# Patient Record
Sex: MALE | Race: BLACK OR AFRICAN AMERICAN | Employment: OTHER | URBAN - METROPOLITAN AREA
[De-identification: names, ages, dates, MRNs, and addresses within clinical notes are randomized per-mention and may not be internally consistent; named-entity substitution may affect disease eponyms.]

---

## 2018-09-25 ENCOUNTER — APPOINTMENT (OUTPATIENT)
Dept: GENERAL RADIOLOGY | Age: 58
End: 2018-09-25
Payer: MEDICARE

## 2018-09-25 ENCOUNTER — HOSPITAL ENCOUNTER (EMERGENCY)
Age: 58
Discharge: ANOTHER HOSPITAL | End: 2018-09-25
Attending: EMERGENCY MEDICINE
Payer: MEDICARE

## 2018-09-25 VITALS
TEMPERATURE: 97.8 F | WEIGHT: 266 LBS | BODY MASS INDEX: 35.25 KG/M2 | DIASTOLIC BLOOD PRESSURE: 80 MMHG | OXYGEN SATURATION: 100 % | HEART RATE: 99 BPM | HEIGHT: 73 IN | SYSTOLIC BLOOD PRESSURE: 128 MMHG | RESPIRATION RATE: 16 BRPM

## 2018-09-25 DIAGNOSIS — M79.89 SWOLLEN ARM: ICD-10-CM

## 2018-09-25 DIAGNOSIS — T82.9XXA COMPLICATION OF ARTERIOVENOUS DIALYSIS FISTULA, INITIAL ENCOUNTER: Primary | ICD-10-CM

## 2018-09-25 LAB
% CKMB: 1.1 % (ref 0–3.5)
ABSOLUTE EOS #: 0.2 K/UL (ref 0–0.4)
ABSOLUTE IMMATURE GRANULOCYTE: ABNORMAL K/UL (ref 0–0.3)
ABSOLUTE LYMPH #: 1.7 K/UL (ref 1–4.8)
ABSOLUTE MONO #: 1.6 K/UL (ref 0.2–0.8)
ANION GAP SERPL CALCULATED.3IONS-SCNC: 18 MMOL/L (ref 9–17)
BASOPHILS # BLD: 0 % (ref 0–2)
BASOPHILS ABSOLUTE: 0 K/UL (ref 0–0.2)
BNP INTERPRETATION: ABNORMAL
BUN BLDV-MCNC: 45 MG/DL (ref 6–20)
BUN/CREAT BLD: 3 (ref 9–20)
CALCIUM SERPL-MCNC: 9.1 MG/DL (ref 8.6–10.4)
CHLORIDE BLD-SCNC: 90 MMOL/L (ref 98–107)
CK MB: 5.3 NG/ML
CKMB INTERPRETATION: ABNORMAL
CO2: 28 MMOL/L (ref 20–31)
CREAT SERPL-MCNC: 13.36 MG/DL (ref 0.7–1.2)
DIFFERENTIAL TYPE: ABNORMAL
EKG ATRIAL RATE: 82 BPM
EKG P AXIS: 71 DEGREES
EKG P-R INTERVAL: 176 MS
EKG Q-T INTERVAL: 418 MS
EKG QRS DURATION: 110 MS
EKG QTC CALCULATION (BAZETT): 488 MS
EKG R AXIS: 33 DEGREES
EKG T AXIS: 59 DEGREES
EKG VENTRICULAR RATE: 82 BPM
EOSINOPHILS RELATIVE PERCENT: 1 % (ref 1–4)
GFR AFRICAN AMERICAN: 5 ML/MIN
GFR NON-AFRICAN AMERICAN: 4 ML/MIN
GFR SERPL CREATININE-BSD FRML MDRD: ABNORMAL ML/MIN/{1.73_M2}
GFR SERPL CREATININE-BSD FRML MDRD: ABNORMAL ML/MIN/{1.73_M2}
GLUCOSE BLD-MCNC: 94 MG/DL (ref 70–99)
HCT VFR BLD CALC: 29.6 % (ref 41–53)
HEMOGLOBIN: 9.9 G/DL (ref 13.5–17.5)
IMMATURE GRANULOCYTES: ABNORMAL %
INR BLD: 1.1
LYMPHOCYTES # BLD: 12 % (ref 24–44)
MAGNESIUM: 2 MG/DL (ref 1.6–2.6)
MCH RBC QN AUTO: 30.7 PG (ref 26–34)
MCHC RBC AUTO-ENTMCNC: 33.5 G/DL (ref 31–37)
MCV RBC AUTO: 91.7 FL (ref 80–100)
MONOCYTES # BLD: 12 % (ref 1–7)
MYOGLOBIN: 1392 NG/ML (ref 28–72)
NRBC AUTOMATED: ABNORMAL PER 100 WBC
PARTIAL THROMBOPLASTIN TIME: 31.8 SEC (ref 23–31)
PDW BLD-RTO: 14.1 % (ref 11.5–14.5)
PLATELET # BLD: 161 K/UL (ref 130–400)
PLATELET ESTIMATE: ABNORMAL
PMV BLD AUTO: ABNORMAL FL (ref 6–12)
POTASSIUM SERPL-SCNC: 3.4 MMOL/L (ref 3.7–5.3)
PRO-BNP: 4129 PG/ML
PROTHROMBIN TIME: 11.1 SEC (ref 9.7–11.6)
RBC # BLD: 3.23 M/UL (ref 4.5–5.9)
RBC # BLD: ABNORMAL 10*6/UL
SEG NEUTROPHILS: 75 % (ref 36–66)
SEGMENTED NEUTROPHILS ABSOLUTE COUNT: 10.3 K/UL (ref 1.8–7.7)
SODIUM BLD-SCNC: 136 MMOL/L (ref 135–144)
TOTAL CK: 465 U/L (ref 39–308)
TROPONIN INTERP: ABNORMAL
TROPONIN T: 0.28 NG/ML
WBC # BLD: 13.8 K/UL (ref 3.5–11)
WBC # BLD: ABNORMAL 10*3/UL

## 2018-09-25 PROCEDURE — C9113 INJ PANTOPRAZOLE SODIUM, VIA: HCPCS | Performed by: EMERGENCY MEDICINE

## 2018-09-25 PROCEDURE — 96376 TX/PRO/DX INJ SAME DRUG ADON: CPT

## 2018-09-25 PROCEDURE — 93005 ELECTROCARDIOGRAM TRACING: CPT

## 2018-09-25 PROCEDURE — 2580000003 HC RX 258: Performed by: EMERGENCY MEDICINE

## 2018-09-25 PROCEDURE — 83880 ASSAY OF NATRIURETIC PEPTIDE: CPT

## 2018-09-25 PROCEDURE — 85025 COMPLETE CBC W/AUTO DIFF WBC: CPT

## 2018-09-25 PROCEDURE — 96375 TX/PRO/DX INJ NEW DRUG ADDON: CPT

## 2018-09-25 PROCEDURE — 99283 EMERGENCY DEPT VISIT LOW MDM: CPT

## 2018-09-25 PROCEDURE — 82553 CREATINE MB FRACTION: CPT

## 2018-09-25 PROCEDURE — 6370000000 HC RX 637 (ALT 250 FOR IP): Performed by: EMERGENCY MEDICINE

## 2018-09-25 PROCEDURE — 83735 ASSAY OF MAGNESIUM: CPT

## 2018-09-25 PROCEDURE — 82550 ASSAY OF CK (CPK): CPT

## 2018-09-25 PROCEDURE — 85730 THROMBOPLASTIN TIME PARTIAL: CPT

## 2018-09-25 PROCEDURE — 84484 ASSAY OF TROPONIN QUANT: CPT

## 2018-09-25 PROCEDURE — 85610 PROTHROMBIN TIME: CPT

## 2018-09-25 PROCEDURE — 71045 X-RAY EXAM CHEST 1 VIEW: CPT

## 2018-09-25 PROCEDURE — 96374 THER/PROPH/DIAG INJ IV PUSH: CPT

## 2018-09-25 PROCEDURE — 6360000002 HC RX W HCPCS: Performed by: EMERGENCY MEDICINE

## 2018-09-25 PROCEDURE — 83874 ASSAY OF MYOGLOBIN: CPT

## 2018-09-25 PROCEDURE — 80048 BASIC METABOLIC PNL TOTAL CA: CPT

## 2018-09-25 RX ORDER — 0.9 % SODIUM CHLORIDE 0.9 %
10 VIAL (ML) INJECTION ONCE
Status: COMPLETED | OUTPATIENT
Start: 2018-09-25 | End: 2018-09-25

## 2018-09-25 RX ORDER — PANTOPRAZOLE SODIUM 40 MG/10ML
40 INJECTION, POWDER, LYOPHILIZED, FOR SOLUTION INTRAVENOUS ONCE
Status: COMPLETED | OUTPATIENT
Start: 2018-09-25 | End: 2018-09-25

## 2018-09-25 RX ORDER — ONDANSETRON 2 MG/ML
4 INJECTION INTRAMUSCULAR; INTRAVENOUS ONCE
Status: COMPLETED | OUTPATIENT
Start: 2018-09-25 | End: 2018-09-25

## 2018-09-25 RX ORDER — ASPIRIN 81 MG/1
324 TABLET, CHEWABLE ORAL ONCE
Status: COMPLETED | OUTPATIENT
Start: 2018-09-25 | End: 2018-09-25

## 2018-09-25 RX ORDER — MORPHINE SULFATE 4 MG/ML
4 INJECTION, SOLUTION INTRAMUSCULAR; INTRAVENOUS ONCE
Status: COMPLETED | OUTPATIENT
Start: 2018-09-25 | End: 2018-09-25

## 2018-09-25 RX ADMIN — MORPHINE SULFATE 4 MG: 4 INJECTION INTRAVENOUS at 05:48

## 2018-09-25 RX ADMIN — LIDOCAINE HYDROCHLORIDE: 20 SOLUTION ORAL; TOPICAL at 06:31

## 2018-09-25 RX ADMIN — Medication 10 ML: at 06:32

## 2018-09-25 RX ADMIN — ONDANSETRON 4 MG: 2 INJECTION INTRAMUSCULAR; INTRAVENOUS at 05:48

## 2018-09-25 RX ADMIN — MORPHINE SULFATE 4 MG: 4 INJECTION INTRAVENOUS at 06:32

## 2018-09-25 RX ADMIN — PANTOPRAZOLE SODIUM 40 MG: 40 INJECTION, POWDER, FOR SOLUTION INTRAVENOUS at 06:31

## 2018-09-25 RX ADMIN — ASPIRIN 81 MG 324 MG: 81 TABLET ORAL at 06:36

## 2018-09-25 ASSESSMENT — PAIN SCALES - GENERAL
PAINLEVEL_OUTOF10: 10
PAINLEVEL_OUTOF10: 4

## 2018-09-25 ASSESSMENT — PAIN DESCRIPTION - ORIENTATION: ORIENTATION: LEFT

## 2018-09-25 ASSESSMENT — PAIN DESCRIPTION - LOCATION: LOCATION: ARM

## 2018-09-25 NOTE — ED NOTES
Ok per Dr Velvet Campbell for patient to go to bed 8802-0 with sudden onset of chest pain, advised Livier PETTIT at [facility]  09/25/18 6869

## 2018-09-25 NOTE — ED NOTES
Fistula was put in at [facility] waiting on confirmation of transfer     Leonel Mendez RN  09/25/18 1360

## 2018-09-26 LAB
EKG ATRIAL RATE: 86 BPM
EKG P AXIS: 70 DEGREES
EKG P-R INTERVAL: 172 MS
EKG Q-T INTERVAL: 418 MS
EKG QRS DURATION: 104 MS
EKG QTC CALCULATION (BAZETT): 500 MS
EKG R AXIS: 27 DEGREES
EKG T AXIS: 57 DEGREES
EKG VENTRICULAR RATE: 86 BPM

## 2019-08-14 ENCOUNTER — HOSPITAL ENCOUNTER (EMERGENCY)
Age: 59
Discharge: HOME | End: 2019-08-14
Attending: EMERGENCY MEDICINE
Payer: COMMERCIAL

## 2019-08-14 VITALS
OXYGEN SATURATION: 99 % | BODY MASS INDEX: 35.25 KG/M2 | TEMPERATURE: 98.1 F | WEIGHT: 266 LBS | HEART RATE: 102 BPM | DIASTOLIC BLOOD PRESSURE: 68 MMHG | SYSTOLIC BLOOD PRESSURE: 105 MMHG | RESPIRATION RATE: 15 BRPM | HEIGHT: 73 IN

## 2019-08-14 DIAGNOSIS — Z95.828 S/P ARTERIOVENOUS (AV) GRAFT PLACEMENT: Primary | ICD-10-CM

## 2019-08-14 DIAGNOSIS — R58 BLEEDING: ICD-10-CM

## 2019-08-14 LAB
HCT VFR BLD CALC: 27.8 % (ref 40.7–50.3)
HEMOGLOBIN: 8.9 G/DL (ref 13–17)
MCH RBC QN AUTO: 31 PG (ref 25.2–33.5)
MCHC RBC AUTO-ENTMCNC: 32 G/DL (ref 28.4–34.8)
MCV RBC AUTO: 96.9 FL (ref 82.6–102.9)
NRBC AUTOMATED: 0 PER 100 WBC
PDW BLD-RTO: 15.6 % (ref 11.8–14.4)
PLATELET # BLD: 177 K/UL (ref 138–453)
PMV BLD AUTO: 12.2 FL (ref 8.1–13.5)
RBC # BLD: 2.87 M/UL (ref 4.21–5.77)
WBC # BLD: 12.1 K/UL (ref 3.5–11.3)

## 2019-08-14 PROCEDURE — 85027 COMPLETE CBC AUTOMATED: CPT

## 2019-08-14 PROCEDURE — 99283 EMERGENCY DEPT VISIT LOW MDM: CPT

## 2019-08-14 ASSESSMENT — ENCOUNTER SYMPTOMS
CONSTIPATION: 0
DIARRHEA: 0
ABDOMINAL DISTENTION: 0
SORE THROAT: 0
NAUSEA: 0
WHEEZING: 0
VOMITING: 0
SHORTNESS OF BREATH: 0
COUGH: 0
RHINORRHEA: 0

## 2019-08-14 ASSESSMENT — PAIN DESCRIPTION - PAIN TYPE: TYPE: ACUTE PAIN

## 2019-08-14 ASSESSMENT — PAIN SCALES - GENERAL: PAINLEVEL_OUTOF10: 8

## 2019-08-14 ASSESSMENT — PAIN DESCRIPTION - LOCATION: LOCATION: ARM

## 2019-08-14 ASSESSMENT — PAIN DESCRIPTION - ORIENTATION: ORIENTATION: RIGHT;UPPER

## 2019-08-15 ENCOUNTER — HOSPITAL ENCOUNTER (EMERGENCY)
Age: 59
Discharge: ANOTHER HOSPITAL | End: 2019-08-15
Attending: EMERGENCY MEDICINE
Payer: COMMERCIAL

## 2019-08-15 VITALS
SYSTOLIC BLOOD PRESSURE: 114 MMHG | DIASTOLIC BLOOD PRESSURE: 26 MMHG | WEIGHT: 274 LBS | BODY MASS INDEX: 36.31 KG/M2 | TEMPERATURE: 97.9 F | RESPIRATION RATE: 18 BRPM | HEIGHT: 73 IN | OXYGEN SATURATION: 100 % | HEART RATE: 86 BPM

## 2019-08-15 DIAGNOSIS — T82.838A: Primary | ICD-10-CM

## 2019-08-15 PROCEDURE — 99284 EMERGENCY DEPT VISIT MOD MDM: CPT

## 2019-08-15 RX ORDER — LANTHANUM CARBONATE 1000 MG/1
1000 TABLET, CHEWABLE ORAL
Refills: 11 | COMMUNITY
Start: 2019-07-15

## 2019-08-15 RX ORDER — FOLIC ACID/VIT B COMPLEX AND C 0.8 MG
TABLET ORAL
Refills: 4 | Status: ON HOLD | COMMUNITY
Start: 2019-06-05 | End: 2022-02-28 | Stop reason: SDDI

## 2019-08-15 RX ORDER — OXYCODONE HYDROCHLORIDE AND ACETAMINOPHEN 5; 325 MG/1; MG/1
TABLET ORAL
Refills: 0 | Status: ON HOLD | COMMUNITY
Start: 2019-08-12 | End: 2020-10-21 | Stop reason: HOSPADM

## 2019-08-15 RX ORDER — APIXABAN 5 MG/1
5 TABLET, FILM COATED ORAL 2 TIMES DAILY
Refills: 3 | Status: ON HOLD | COMMUNITY
Start: 2019-07-29 | End: 2022-02-28 | Stop reason: SINTOL

## 2019-08-15 RX ORDER — METOPROLOL SUCCINATE 50 MG/1
50 TABLET, EXTENDED RELEASE ORAL DAILY
Refills: 2 | COMMUNITY
Start: 2019-07-03

## 2019-08-15 RX ORDER — LOSARTAN POTASSIUM 25 MG/1
TABLET ORAL
Refills: 2 | Status: ON HOLD | COMMUNITY
Start: 2019-07-03 | End: 2020-10-21 | Stop reason: HOSPADM

## 2019-08-15 ASSESSMENT — ENCOUNTER SYMPTOMS
SHORTNESS OF BREATH: 0
COLOR CHANGE: 0
COUGH: 0

## 2019-08-15 ASSESSMENT — PAIN DESCRIPTION - PAIN TYPE: TYPE: ACUTE PAIN

## 2019-08-15 ASSESSMENT — PAIN DESCRIPTION - LOCATION: LOCATION: ARM

## 2019-08-15 ASSESSMENT — PAIN DESCRIPTION - ORIENTATION: ORIENTATION: RIGHT

## 2019-08-15 NOTE — ED NOTES
Pt presents to ED via EMS from dialysis ambulatory to room 27 c/o of bleeding graft to right upper arm. Pt states graft was placed on Monday at [facility]. Pt states going to [facility] yesterday for same complaint. Pt received full dialysis treatment today. Pt right arm pressure wrapped. Pt rates pain 8/10. Palp radial pulse with cap refill less than 3 seconds.       Richie Runner, RN  08/15/19 6224

## 2019-08-15 NOTE — ED PROVIDER NOTES
respiratory distress. Neurological: He is alert and oriented to person, place, and time. Skin: Skin is warm and dry. No rash noted. He is not diaphoretic.   4-5 cm incision site to right upper arm. Minimal bleeding from area. Clots noted on dressing. Psychiatric: He has a normal mood and affect. His behavior is normal.   Vitals reviewed. EMERGENCY DEPARTMENT COURSE and DIFFERENTIAL DIAGNOSIS/MDM:   Vitals:    Vitals:    08/15/19 1135 08/15/19 1136   BP:  (!) 115/50   Pulse: 90    Resp: 18    Temp: 97.9 °F (36.6 °C)    SpO2: 100%    Weight: 274 lb (124.3 kg)    Height: 6' 1\" (1.854 m)          CLINICAL DECISION MAKING:  The patient presented alert with a nontoxic appearance and was seen in conjunction with Dr. James Garcia. The patient will be transferred to [facility] to be evaluated by his surgeon. FINAL IMPRESSION      1. Hemorrhage of arteriovenous graft, initial encounter [facility])            Problem List  Patient Active Problem List   Diagnosis Code    Biventricular CHF (congestive heart failure) (HCC) with icd chronic  I50.82    A-fib (HCC) I48.91    DM (diabetes mellitus) type II controlled with renal manifestation ([facility]) E11.29    Pulmonary emboli ([facility] rt lower lobe in 8/15 with negative venous doppler and tt since august 14 - with therapeutic inr  I26.99    ESRD (end stage renal disease) on dialysis ([address] N18.6, Z99.2    Leukocytosis D72.829    Tachycardia R00.0    S/P cholecystectomy Z90.49    Cardiomyopathy ([facility] I42.9    Anemia of chronic disease D63.8         DISPOSITION/PLAN   DISPOSITION Decision To Transfer 08/15/2019 02:53:28 PM      PATIENT REFERRED TO:   No follow-up provider specified.     DISCHARGE MEDICATIONS:     New Prescriptions    No medications on file           (Please note that portions of this note were completed with a voice recognition program.  Efforts were made to edit the dictations but occasionally words are mis-transcribed.)    GYPSY Barker - DIMITRIS Baker

## 2019-09-21 ENCOUNTER — HOSPITAL ENCOUNTER (EMERGENCY)
Age: 59
Discharge: ANOTHER HOSPITAL | End: 2019-09-21
Attending: EMERGENCY MEDICINE
Payer: COMMERCIAL

## 2019-09-21 ENCOUNTER — APPOINTMENT (OUTPATIENT)
Dept: GENERAL RADIOLOGY | Age: 59
End: 2019-09-21
Payer: COMMERCIAL

## 2019-09-21 VITALS
OXYGEN SATURATION: 97 % | DIASTOLIC BLOOD PRESSURE: 67 MMHG | HEART RATE: 94 BPM | WEIGHT: 276 LBS | HEIGHT: 73 IN | TEMPERATURE: 100.3 F | SYSTOLIC BLOOD PRESSURE: 146 MMHG | BODY MASS INDEX: 36.58 KG/M2 | RESPIRATION RATE: 15 BRPM

## 2019-09-21 DIAGNOSIS — L08.9 WOUND INFECTION: Primary | ICD-10-CM

## 2019-09-21 DIAGNOSIS — T14.8XXA WOUND INFECTION: Primary | ICD-10-CM

## 2019-09-21 LAB
ABSOLUTE EOS #: 0 K/UL (ref 0–0.4)
ABSOLUTE IMMATURE GRANULOCYTE: 0 K/UL (ref 0–0.3)
ABSOLUTE LYMPH #: 1.34 K/UL (ref 1–4.8)
ABSOLUTE MONO #: 1.01 K/UL (ref 0.1–0.8)
ALBUMIN SERPL-MCNC: 3.9 G/DL (ref 3.5–5.2)
ALBUMIN/GLOBULIN RATIO: 1 (ref 1–2.5)
ALP BLD-CCNC: 86 U/L (ref 40–129)
ALT SERPL-CCNC: 15 U/L (ref 5–41)
ANION GAP SERPL CALCULATED.3IONS-SCNC: 11 MMOL/L (ref 9–17)
AST SERPL-CCNC: 29 U/L
BASOPHILS # BLD: 0 % (ref 0–2)
BASOPHILS ABSOLUTE: 0 K/UL (ref 0–0.2)
BILIRUB SERPL-MCNC: 0.55 MG/DL (ref 0.3–1.2)
BUN BLDV-MCNC: 13 MG/DL (ref 6–20)
BUN/CREAT BLD: ABNORMAL (ref 9–20)
C-REACTIVE PROTEIN: 82.5 MG/L (ref 0–5)
CALCIUM SERPL-MCNC: 9.1 MG/DL (ref 8.6–10.4)
CHLORIDE BLD-SCNC: 90 MMOL/L (ref 98–107)
CO2: 34 MMOL/L (ref 20–31)
CREAT SERPL-MCNC: 5.91 MG/DL (ref 0.7–1.2)
DIFFERENTIAL TYPE: ABNORMAL
EOSINOPHILS RELATIVE PERCENT: 0 % (ref 1–4)
GFR AFRICAN AMERICAN: 12 ML/MIN
GFR NON-AFRICAN AMERICAN: 10 ML/MIN
GFR SERPL CREATININE-BSD FRML MDRD: ABNORMAL ML/MIN/{1.73_M2}
GFR SERPL CREATININE-BSD FRML MDRD: ABNORMAL ML/MIN/{1.73_M2}
GLUCOSE BLD-MCNC: 99 MG/DL (ref 70–99)
HCT VFR BLD CALC: 32.8 % (ref 40.7–50.3)
HEMOGLOBIN: 10.1 G/DL (ref 13–17)
IMMATURE GRANULOCYTES: 0 %
INR BLD: 1.2
LACTIC ACID, SEPSIS WHOLE BLOOD: 1.2 MMOL/L (ref 0.5–1.9)
LACTIC ACID, SEPSIS: NORMAL MMOL/L (ref 0.5–1.9)
LYMPHOCYTES # BLD: 20 % (ref 24–44)
MCH RBC QN AUTO: 30.8 PG (ref 25.2–33.5)
MCHC RBC AUTO-ENTMCNC: 30.8 G/DL (ref 28.4–34.8)
MCV RBC AUTO: 100 FL (ref 82.6–102.9)
MONOCYTES # BLD: 15 % (ref 1–7)
MORPHOLOGY: ABNORMAL
NRBC AUTOMATED: 0 PER 100 WBC
PARTIAL THROMBOPLASTIN TIME: 26.4 SEC (ref 20.5–30.5)
PDW BLD-RTO: 15.9 % (ref 11.8–14.4)
PLATELET # BLD: 140 K/UL (ref 138–453)
PLATELET ESTIMATE: ABNORMAL
PMV BLD AUTO: 12 FL (ref 8.1–13.5)
POTASSIUM SERPL-SCNC: 4 MMOL/L (ref 3.7–5.3)
PROTHROMBIN TIME: 12.3 SEC (ref 9–12)
RBC # BLD: 3.28 M/UL (ref 4.21–5.77)
RBC # BLD: ABNORMAL 10*6/UL
SEG NEUTROPHILS: 65 % (ref 36–66)
SEGMENTED NEUTROPHILS ABSOLUTE COUNT: 4.35 K/UL (ref 1.8–7.7)
SODIUM BLD-SCNC: 135 MMOL/L (ref 135–144)
TOTAL PROTEIN: 8 G/DL (ref 6.4–8.3)
VANCOMYCIN RANDOM DATE LAST DOSE: NORMAL
VANCOMYCIN RANDOM DOSE AMOUNT: NORMAL
VANCOMYCIN RANDOM TIME LAST DOSE: NORMAL
VANCOMYCIN RANDOM: <4 UG/ML
WBC # BLD: 6.7 K/UL (ref 3.5–11.3)
WBC # BLD: ABNORMAL 10*3/UL

## 2019-09-21 PROCEDURE — 86140 C-REACTIVE PROTEIN: CPT

## 2019-09-21 PROCEDURE — 80202 ASSAY OF VANCOMYCIN: CPT

## 2019-09-21 PROCEDURE — 80053 COMPREHEN METABOLIC PANEL: CPT

## 2019-09-21 PROCEDURE — 83605 ASSAY OF LACTIC ACID: CPT

## 2019-09-21 PROCEDURE — 87186 SC STD MICRODIL/AGAR DIL: CPT

## 2019-09-21 PROCEDURE — 87205 SMEAR GRAM STAIN: CPT

## 2019-09-21 PROCEDURE — 99285 EMERGENCY DEPT VISIT HI MDM: CPT

## 2019-09-21 PROCEDURE — 87040 BLOOD CULTURE FOR BACTERIA: CPT

## 2019-09-21 PROCEDURE — 6370000000 HC RX 637 (ALT 250 FOR IP): Performed by: STUDENT IN AN ORGANIZED HEALTH CARE EDUCATION/TRAINING PROGRAM

## 2019-09-21 PROCEDURE — 85025 COMPLETE CBC W/AUTO DIFF WBC: CPT

## 2019-09-21 PROCEDURE — 85730 THROMBOPLASTIN TIME PARTIAL: CPT

## 2019-09-21 PROCEDURE — 71046 X-RAY EXAM CHEST 2 VIEWS: CPT

## 2019-09-21 PROCEDURE — 85610 PROTHROMBIN TIME: CPT

## 2019-09-21 PROCEDURE — 6360000002 HC RX W HCPCS: Performed by: STUDENT IN AN ORGANIZED HEALTH CARE EDUCATION/TRAINING PROGRAM

## 2019-09-21 PROCEDURE — 87077 CULTURE AEROBIC IDENTIFY: CPT

## 2019-09-21 PROCEDURE — 2580000003 HC RX 258: Performed by: STUDENT IN AN ORGANIZED HEALTH CARE EDUCATION/TRAINING PROGRAM

## 2019-09-21 RX ORDER — ACETAMINOPHEN 325 MG/1
650 TABLET ORAL ONCE
Status: COMPLETED | OUTPATIENT
Start: 2019-09-21 | End: 2019-09-21

## 2019-09-21 RX ADMIN — PIPERACILLIN AND TAZOBACTAM 2.25 G: 2; .25 INJECTION, POWDER, LYOPHILIZED, FOR SOLUTION INTRAVENOUS; PARENTERAL at 20:15

## 2019-09-21 RX ADMIN — ACETAMINOPHEN 650 MG: 325 TABLET ORAL at 20:17

## 2019-09-21 RX ADMIN — VANCOMYCIN HYDROCHLORIDE 2000 MG: 1 INJECTION, POWDER, LYOPHILIZED, FOR SOLUTION INTRAVENOUS at 21:11

## 2019-09-21 ASSESSMENT — ENCOUNTER SYMPTOMS
EYE PAIN: 0
ABDOMINAL DISTENTION: 0
RHINORRHEA: 0
BACK PAIN: 0
SHORTNESS OF BREATH: 0
COLOR CHANGE: 1
VOMITING: 0
TROUBLE SWALLOWING: 0
CONSTIPATION: 0
ABDOMINAL PAIN: 0
SORE THROAT: 0
COUGH: 0
NAUSEA: 0
DIARRHEA: 1
CHOKING: 0
WHEEZING: 0

## 2019-09-21 ASSESSMENT — PAIN DESCRIPTION - PAIN TYPE: TYPE: ACUTE PAIN

## 2019-09-21 ASSESSMENT — PAIN DESCRIPTION - DESCRIPTORS: DESCRIPTORS: DISCOMFORT

## 2019-09-21 ASSESSMENT — PAIN DESCRIPTION - LOCATION: LOCATION: GENERALIZED;HEAD

## 2019-09-21 ASSESSMENT — PAIN SCALES - GENERAL: PAINLEVEL_OUTOF10: 5

## 2019-09-21 NOTE — ED PROVIDER NOTES
DIFFERENTIAL   Result Value Ref Range    WBC 6.7 3.5 - 11.3 k/uL    RBC 3.28 (L) 4.21 - 5.77 m/uL    Hemoglobin 10.1 (L) 13.0 - 17.0 g/dL    Hematocrit 32.8 (L) 40.7 - 50.3 %    .0 82.6 - 102.9 fL    MCH 30.8 25.2 - 33.5 pg    MCHC 30.8 28.4 - 34.8 g/dL    RDW 15.9 (H) 11.8 - 14.4 %    Platelets 454 356 - 737 k/uL    MPV 12.0 8.1 - 13.5 fL    NRBC Automated 0.0 0.0 per 100 WBC    Differential Type NOT REPORTED     WBC Morphology NOT REPORTED     RBC Morphology NOT REPORTED     Platelet Estimate NOT REPORTED     Immature Granulocytes 0 0 %    Seg Neutrophils 65 36 - 66 %    Lymphocytes 20 (L) 24 - 44 %    Monocytes 15 (H) 1 - 7 %    Eosinophils % 0 (L) 1 - 4 %    Basophils 0 0 - 2 %    Absolute Immature Granulocyte 0.00 0.00 - 0.30 k/uL    Segs Absolute 4.35 1.8 - 7.7 k/uL    Absolute Lymph # 1.34 1.0 - 4.8 k/uL    Absolute Mono # 1.01 (H) 0.1 - 0.8 k/uL    Absolute Eos # 0.00 0.0 - 0.4 k/uL    Basophils Absolute 0.00 0.0 - 0.2 k/uL    Morphology ANISOCYTOSIS PRESENT    Comprehensive Metabolic Panel   Result Value Ref Range    Glucose 99 70 - 99 mg/dL    BUN 13 6 - 20 mg/dL    CREATININE 5.91 (HH) 0.70 - 1.20 mg/dL    Bun/Cre Ratio NOT REPORTED 9 - 20    Calcium 9.1 8.6 - 10.4 mg/dL    Sodium 135 135 - 144 mmol/L    Potassium 4.0 3.7 - 5.3 mmol/L    Chloride 90 (L) 98 - 107 mmol/L    CO2 34 (H) 20 - 31 mmol/L    Anion Gap 11 9 - 17 mmol/L    Alkaline Phosphatase 86 40 - 129 U/L    ALT 15 5 - 41 U/L    AST 29 <40 U/L    Total Bilirubin 0.55 0.3 - 1.2 mg/dL    Total Protein 8.0 6.4 - 8.3 g/dL    Alb 3.9 3.5 - 5.2 g/dL    Albumin/Globulin Ratio 1.0 1.0 - 2.5    GFR Non-African American 10 (L) >60 mL/min    GFR  12 (L) >60 mL/min    GFR Comment          GFR Staging NOT REPORTED    Protime-INR   Result Value Ref Range    Protime 12.3 (H) 9.0 - 12.0 sec    INR 1.2    APTT   Result Value Ref Range    PTT 26.4 20.5 - 30.5 sec   C-REACTIVE PROTEIN   Result Value Ref Range    CRP 82.5 (H) 0.0 - 5.0 mg/L Vancomycin, Random   Result Value Ref Range    Vancomycin Rm <4.0 ug/mL    Vancomycin Random Dose amount NOT REPORTED     Vancomycin Random Date last dose NOT REPORTED     Vancomycin Random Time last dose NOT REPORTED        RADIOLOGY:  Xr Chest Standard (2 Vw)    Result Date: 9/21/2019  EXAMINATION: TWO XRAY VIEWS OF THE CHEST 9/21/2019 7:13 pm COMPARISON: Chest radiograph 09/25/2019. HISTORY: ORDERING SYSTEM PROVIDED HISTORY: fever TECHNOLOGIST PROVIDED HISTORY: fever Reason for Exam: c/o headache, chills, not feeling well, fever Acuity: Acute Type of Exam: Initial FINDINGS: Two views provided. Mild rotation to the left. Stable mediastinal and cardiac silhouettes. Left-sided AICD appears stable. There is a right dual-lumen CVC with the distal tip in the proximal to mid superior vena cava. Stable right hemidiaphragm elevation. No acute consolidation or interstitial edema. No pneumothorax or free subdiaphragmatic air. No acute pneumonia. EKG  None    All EKG's are interpreted by the Emergency Department Physician who either signs or Co-signs this chart in the absence of a cardiologist.    EMERGENCY DEPARTMENT COURSE:  Patient with fever, tachycardia history of end-stage renal disease with recent surgical site infection from arteriovenous graft on right forearm. Patient is not taking any antipyretics. Was possibly on doxycycline. Patient was supposed to be taken to the OR for washout but left the hospital [address] Patient's vascular surgeon was called at [address] [place] [address] who agreed to take the patient is a transfer. Internal medicine at the [address] [place] [address] was also contacted. Patient will be started on vancomycin and Zosyn. Vancomycin was possibly given 1 to 2 days ago [address] so a vancomycin level was drawn prior to dosing by pharmacy.   CRP elevated patient creatinine elevated but no gross electrolyte dyscrasias with the

## 2019-09-22 NOTE — ED NOTES
Transferred to [facility] via [facility] at this time.  Charting sent with pt      Jhonatan Dave RN  09/21/19 4594

## 2019-09-23 LAB
CULTURE: ABNORMAL
Lab: ABNORMAL
Lab: ABNORMAL
SPECIMEN DESCRIPTION: ABNORMAL
SPECIMEN DESCRIPTION: ABNORMAL

## 2020-10-17 ENCOUNTER — APPOINTMENT (OUTPATIENT)
Dept: GENERAL RADIOLOGY | Age: 60
End: 2020-10-17
Payer: COMMERCIAL

## 2020-10-17 ENCOUNTER — HOSPITAL ENCOUNTER (EMERGENCY)
Age: 60
Discharge: OTHER INSTITUTION | End: 2020-10-17
Attending: EMERGENCY MEDICINE
Payer: COMMERCIAL

## 2020-10-17 ENCOUNTER — HOSPITAL ENCOUNTER (INPATIENT)
Age: 60
LOS: 4 days | Discharge: HOME | DRG: 871 | End: 2020-10-21
Attending: FAMILY MEDICINE | Admitting: FAMILY MEDICINE
Payer: COMMERCIAL

## 2020-10-17 VITALS
HEART RATE: 89 BPM | WEIGHT: 270.73 LBS | OXYGEN SATURATION: 95 % | RESPIRATION RATE: 18 BRPM | DIASTOLIC BLOOD PRESSURE: 85 MMHG | SYSTOLIC BLOOD PRESSURE: 128 MMHG | BODY MASS INDEX: 35.88 KG/M2 | TEMPERATURE: 99.1 F | HEIGHT: 73 IN

## 2020-10-17 PROBLEM — R50.9 FEVER OF UNKNOWN ORIGIN: Status: ACTIVE | Noted: 2020-10-17

## 2020-10-17 PROBLEM — R50.9 FEVER AND CHILLS: Status: ACTIVE | Noted: 2020-10-17

## 2020-10-17 LAB
ABSOLUTE EOS #: 0 K/UL (ref 0–0.4)
ABSOLUTE IMMATURE GRANULOCYTE: 0.09 K/UL (ref 0–0.3)
ABSOLUTE LYMPH #: 0.81 K/UL (ref 1–4.8)
ABSOLUTE MONO #: 1.62 K/UL (ref 0.2–0.8)
ALBUMIN SERPL-MCNC: 3.9 G/DL (ref 3.5–5.2)
ALBUMIN/GLOBULIN RATIO: ABNORMAL (ref 1–2.5)
ALP BLD-CCNC: 76 U/L (ref 40–129)
ALT SERPL-CCNC: 21 U/L (ref 5–41)
ANION GAP SERPL CALCULATED.3IONS-SCNC: 15 MMOL/L (ref 9–17)
AST SERPL-CCNC: 24 U/L
BASOPHILS # BLD: 0 %
BASOPHILS ABSOLUTE: 0 K/UL (ref 0–0.2)
BILIRUB SERPL-MCNC: 0.51 MG/DL (ref 0.3–1.2)
BILIRUBIN DIRECT: 0.14 MG/DL
BILIRUBIN, INDIRECT: 0.37 MG/DL (ref 0–1)
BUN BLDV-MCNC: 38 MG/DL (ref 6–20)
BUN/CREAT BLD: 3 (ref 9–20)
CALCIUM SERPL-MCNC: 9.4 MG/DL (ref 8.6–10.4)
CHLORIDE BLD-SCNC: 92 MMOL/L (ref 98–107)
CO2: 27 MMOL/L (ref 20–31)
CREAT SERPL-MCNC: 11.64 MG/DL (ref 0.7–1.2)
DIFFERENTIAL TYPE: ABNORMAL
EOSINOPHILS RELATIVE PERCENT: 0 % (ref 1–4)
GFR AFRICAN AMERICAN: 5 ML/MIN
GFR NON-AFRICAN AMERICAN: 5 ML/MIN
GFR SERPL CREATININE-BSD FRML MDRD: ABNORMAL ML/MIN/{1.73_M2}
GFR SERPL CREATININE-BSD FRML MDRD: ABNORMAL ML/MIN/{1.73_M2}
GLOBULIN: ABNORMAL G/DL (ref 1.5–3.8)
GLUCOSE BLD-MCNC: 93 MG/DL (ref 70–99)
HCT VFR BLD CALC: 30.2 % (ref 40.7–50.3)
HEMOGLOBIN: 9.7 G/DL (ref 13–17)
IMMATURE GRANULOCYTES: 1 %
LACTIC ACID, SEPSIS WHOLE BLOOD: NORMAL MMOL/L (ref 0.5–1.9)
LACTIC ACID, SEPSIS: 1 MMOL/L (ref 0.5–1.9)
LIPASE: 23 U/L (ref 13–60)
LYMPHOCYTES # BLD: 9 % (ref 24–44)
MCH RBC QN AUTO: 30 PG (ref 25.2–33.5)
MCHC RBC AUTO-ENTMCNC: 32.1 G/DL (ref 28.4–34.8)
MCV RBC AUTO: 93.5 FL (ref 82.6–102.9)
MONOCYTES # BLD: 18 % (ref 1–7)
NRBC AUTOMATED: 0 PER 100 WBC
PDW BLD-RTO: 14.1 % (ref 11.8–14.4)
PLATELET # BLD: 213 K/UL (ref 138–453)
PLATELET ESTIMATE: ABNORMAL
PMV BLD AUTO: 11.4 FL (ref 8.1–13.5)
POTASSIUM SERPL-SCNC: 4.8 MMOL/L (ref 3.7–5.3)
RBC # BLD: 3.23 M/UL (ref 4.21–5.77)
RBC # BLD: ABNORMAL 10*6/UL
SARS-COV-2, RAPID: NOT DETECTED
SARS-COV-2: NORMAL
SARS-COV-2: NORMAL
SEG NEUTROPHILS: 72 % (ref 36–66)
SEGMENTED NEUTROPHILS ABSOLUTE COUNT: 6.48 K/UL (ref 1.8–7.7)
SODIUM BLD-SCNC: 134 MMOL/L (ref 135–144)
SOURCE: NORMAL
TOTAL PROTEIN: 8.5 G/DL (ref 6.4–8.3)
TROPONIN INTERP: ABNORMAL
TROPONIN T: ABNORMAL NG/ML
TROPONIN, HIGH SENSITIVITY: 140 NG/L (ref 0–22)
TROPONIN, HIGH SENSITIVITY: 154 NG/L (ref 0–22)
TROPONIN, HIGH SENSITIVITY: 186 NG/L (ref 0–22)
WBC # BLD: 9 K/UL (ref 3.5–11.3)
WBC # BLD: ABNORMAL 10*3/UL

## 2020-10-17 PROCEDURE — 83605 ASSAY OF LACTIC ACID: CPT

## 2020-10-17 PROCEDURE — 99223 1ST HOSP IP/OBS HIGH 75: CPT | Performed by: INTERNAL MEDICINE

## 2020-10-17 PROCEDURE — 86403 PARTICLE AGGLUT ANTBDY SCRN: CPT

## 2020-10-17 PROCEDURE — 2580000003 HC RX 258: Performed by: FAMILY MEDICINE

## 2020-10-17 PROCEDURE — 6370000000 HC RX 637 (ALT 250 FOR IP): Performed by: INTERNAL MEDICINE

## 2020-10-17 PROCEDURE — 87150 DNA/RNA AMPLIFIED PROBE: CPT

## 2020-10-17 PROCEDURE — 6360000002 HC RX W HCPCS: Performed by: EMERGENCY MEDICINE

## 2020-10-17 PROCEDURE — 84484 ASSAY OF TROPONIN QUANT: CPT

## 2020-10-17 PROCEDURE — 93005 ELECTROCARDIOGRAM TRACING: CPT | Performed by: EMERGENCY MEDICINE

## 2020-10-17 PROCEDURE — 36415 COLL VENOUS BLD VENIPUNCTURE: CPT

## 2020-10-17 PROCEDURE — 2580000003 HC RX 258: Performed by: EMERGENCY MEDICINE

## 2020-10-17 PROCEDURE — 90935 HEMODIALYSIS ONE EVALUATION: CPT

## 2020-10-17 PROCEDURE — 2060000000 HC ICU INTERMEDIATE R&B

## 2020-10-17 PROCEDURE — 99285 EMERGENCY DEPT VISIT HI MDM: CPT

## 2020-10-17 PROCEDURE — 96374 THER/PROPH/DIAG INJ IV PUSH: CPT

## 2020-10-17 PROCEDURE — 83690 ASSAY OF LIPASE: CPT

## 2020-10-17 PROCEDURE — 80076 HEPATIC FUNCTION PANEL: CPT

## 2020-10-17 PROCEDURE — 80048 BASIC METABOLIC PNL TOTAL CA: CPT

## 2020-10-17 PROCEDURE — 87205 SMEAR GRAM STAIN: CPT

## 2020-10-17 PROCEDURE — 85025 COMPLETE CBC W/AUTO DIFF WBC: CPT

## 2020-10-17 PROCEDURE — 99284 EMERGENCY DEPT VISIT MOD MDM: CPT

## 2020-10-17 PROCEDURE — 71045 X-RAY EXAM CHEST 1 VIEW: CPT

## 2020-10-17 PROCEDURE — 87040 BLOOD CULTURE FOR BACTERIA: CPT

## 2020-10-17 PROCEDURE — 87186 SC STD MICRODIL/AGAR DIL: CPT

## 2020-10-17 PROCEDURE — 6370000000 HC RX 637 (ALT 250 FOR IP): Performed by: FAMILY MEDICINE

## 2020-10-17 PROCEDURE — U0002 COVID-19 LAB TEST NON-CDC: HCPCS

## 2020-10-17 PROCEDURE — 6370000000 HC RX 637 (ALT 250 FOR IP): Performed by: EMERGENCY MEDICINE

## 2020-10-17 RX ORDER — METOPROLOL SUCCINATE 25 MG/1
1 TABLET, EXTENDED RELEASE ORAL DAILY
Status: DISCONTINUED | OUTPATIENT
Start: 2020-10-17 | End: 2020-10-17 | Stop reason: HOSPADM

## 2020-10-17 RX ORDER — DOXYCYCLINE 100 MG/1
100 CAPSULE ORAL EVERY 12 HOURS SCHEDULED
Status: DISCONTINUED | OUTPATIENT
Start: 2020-10-17 | End: 2020-10-17 | Stop reason: HOSPADM

## 2020-10-17 RX ORDER — 0.9 % SODIUM CHLORIDE 0.9 %
500 INTRAVENOUS SOLUTION INTRAVENOUS ONCE
Status: COMPLETED | OUTPATIENT
Start: 2020-10-17 | End: 2020-10-17

## 2020-10-17 RX ORDER — MIDODRINE HYDROCHLORIDE 5 MG/1
10 TABLET ORAL PRN
Status: DISCONTINUED | OUTPATIENT
Start: 2020-10-17 | End: 2020-10-21 | Stop reason: HOSPADM

## 2020-10-17 RX ORDER — ASPIRIN 81 MG/1
81 TABLET, CHEWABLE ORAL DAILY
Status: CANCELLED | OUTPATIENT
Start: 2020-10-18

## 2020-10-17 RX ORDER — MIDODRINE HYDROCHLORIDE 10 MG/1
10 TABLET ORAL PRN
Status: DISCONTINUED | OUTPATIENT
Start: 2020-10-17 | End: 2020-10-17 | Stop reason: HOSPADM

## 2020-10-17 RX ORDER — ONDANSETRON 2 MG/ML
4 INJECTION INTRAMUSCULAR; INTRAVENOUS EVERY 6 HOURS PRN
Status: DISCONTINUED | OUTPATIENT
Start: 2020-10-17 | End: 2020-10-21 | Stop reason: HOSPADM

## 2020-10-17 RX ORDER — METHYLPREDNISOLONE 4 MG/1
2 TABLET ORAL DAILY
Status: DISCONTINUED | OUTPATIENT
Start: 2020-10-17 | End: 2020-10-17 | Stop reason: HOSPADM

## 2020-10-17 RX ORDER — ONDANSETRON 2 MG/ML
4 INJECTION INTRAMUSCULAR; INTRAVENOUS ONCE
Status: COMPLETED | OUTPATIENT
Start: 2020-10-17 | End: 2020-10-17

## 2020-10-17 RX ORDER — POTASSIUM CHLORIDE 7.45 MG/ML
10 INJECTION INTRAVENOUS PRN
Status: DISCONTINUED | OUTPATIENT
Start: 2020-10-17 | End: 2020-10-17 | Stop reason: HOSPADM

## 2020-10-17 RX ORDER — ACETAMINOPHEN 325 MG/1
650 TABLET ORAL EVERY 6 HOURS PRN
Status: DISCONTINUED | OUTPATIENT
Start: 2020-10-17 | End: 2020-10-17 | Stop reason: HOSPADM

## 2020-10-17 RX ORDER — METHOCARBAMOL 750 MG/1
750 TABLET, FILM COATED ORAL 3 TIMES DAILY
Status: DISCONTINUED | OUTPATIENT
Start: 2020-10-17 | End: 2020-10-17 | Stop reason: HOSPADM

## 2020-10-17 RX ORDER — PROMETHAZINE HYDROCHLORIDE 25 MG/1
12.5 TABLET ORAL EVERY 6 HOURS PRN
Status: CANCELLED | OUTPATIENT
Start: 2020-10-17

## 2020-10-17 RX ORDER — ONDANSETRON 2 MG/ML
4 INJECTION INTRAMUSCULAR; INTRAVENOUS EVERY 6 HOURS PRN
Status: CANCELLED | OUTPATIENT
Start: 2020-10-17

## 2020-10-17 RX ORDER — ACETAMINOPHEN 650 MG/1
650 SUPPOSITORY RECTAL EVERY 6 HOURS PRN
Status: CANCELLED | OUTPATIENT
Start: 2020-10-17

## 2020-10-17 RX ORDER — METHOCARBAMOL 750 MG/1
750 TABLET, FILM COATED ORAL 3 TIMES DAILY
Status: DISCONTINUED | OUTPATIENT
Start: 2020-10-17 | End: 2020-10-21 | Stop reason: HOSPADM

## 2020-10-17 RX ORDER — METHOCARBAMOL 750 MG/1
750 TABLET, FILM COATED ORAL 3 TIMES DAILY PRN
COMMUNITY

## 2020-10-17 RX ORDER — LOSARTAN POTASSIUM 25 MG/1
25 TABLET ORAL DAILY
Status: DISCONTINUED | OUTPATIENT
Start: 2020-10-18 | End: 2020-10-18

## 2020-10-17 RX ORDER — FAMOTIDINE 20 MG/1
20 TABLET, FILM COATED ORAL DAILY
Status: CANCELLED | OUTPATIENT
Start: 2020-10-18

## 2020-10-17 RX ORDER — POTASSIUM CHLORIDE 20 MEQ/1
40 TABLET, EXTENDED RELEASE ORAL PRN
Status: CANCELLED | OUTPATIENT
Start: 2020-10-17

## 2020-10-17 RX ORDER — POLYETHYLENE GLYCOL 3350 17 G/17G
17 POWDER, FOR SOLUTION ORAL DAILY PRN
Status: DISCONTINUED | OUTPATIENT
Start: 2020-10-17 | End: 2020-10-21 | Stop reason: HOSPADM

## 2020-10-17 RX ORDER — POTASSIUM CHLORIDE 7.45 MG/ML
10 INJECTION INTRAVENOUS PRN
Status: DISCONTINUED | OUTPATIENT
Start: 2020-10-17 | End: 2020-10-19

## 2020-10-17 RX ORDER — ASPIRIN 81 MG/1
81 TABLET, CHEWABLE ORAL DAILY
Status: DISCONTINUED | OUTPATIENT
Start: 2020-10-17 | End: 2020-10-17 | Stop reason: HOSPADM

## 2020-10-17 RX ORDER — FAMOTIDINE 20 MG/1
20 TABLET, FILM COATED ORAL DAILY
Status: DISCONTINUED | OUTPATIENT
Start: 2020-10-17 | End: 2020-10-17 | Stop reason: HOSPADM

## 2020-10-17 RX ORDER — ACETAMINOPHEN 325 MG/1
650 TABLET ORAL EVERY 6 HOURS PRN
Status: DISCONTINUED | OUTPATIENT
Start: 2020-10-17 | End: 2020-10-21 | Stop reason: HOSPADM

## 2020-10-17 RX ORDER — 0.9 % SODIUM CHLORIDE 0.9 %
1000 INTRAVENOUS SOLUTION INTRAVENOUS ONCE
Status: DISCONTINUED | OUTPATIENT
Start: 2020-10-17 | End: 2020-10-17

## 2020-10-17 RX ORDER — METHYLPREDNISOLONE 4 MG/1
2 TABLET ORAL DAILY
Status: ON HOLD | COMMUNITY
End: 2020-10-21 | Stop reason: HOSPADM

## 2020-10-17 RX ORDER — SODIUM CHLORIDE 0.9 % (FLUSH) 0.9 %
10 SYRINGE (ML) INJECTION EVERY 12 HOURS SCHEDULED
Status: DISCONTINUED | OUTPATIENT
Start: 2020-10-17 | End: 2020-10-17 | Stop reason: HOSPADM

## 2020-10-17 RX ORDER — POLYETHYLENE GLYCOL 3350 17 G/17G
17 POWDER, FOR SOLUTION ORAL DAILY PRN
Status: CANCELLED | OUTPATIENT
Start: 2020-10-17

## 2020-10-17 RX ORDER — PROMETHAZINE HYDROCHLORIDE 12.5 MG/1
12.5 TABLET ORAL EVERY 6 HOURS PRN
Status: DISCONTINUED | OUTPATIENT
Start: 2020-10-17 | End: 2020-10-21 | Stop reason: HOSPADM

## 2020-10-17 RX ORDER — NICOTINE 21 MG/24HR
1 PATCH, TRANSDERMAL 24 HOURS TRANSDERMAL DAILY PRN
Status: CANCELLED | OUTPATIENT
Start: 2020-10-17

## 2020-10-17 RX ORDER — ACETAMINOPHEN 325 MG/1
650 TABLET ORAL ONCE
Status: COMPLETED | OUTPATIENT
Start: 2020-10-17 | End: 2020-10-17

## 2020-10-17 RX ORDER — DOXYCYCLINE HYCLATE 100 MG/1
100 CAPSULE ORAL 2 TIMES DAILY
Status: ON HOLD | COMMUNITY
End: 2020-10-21 | Stop reason: HOSPADM

## 2020-10-17 RX ORDER — DOXYCYCLINE 100 MG/1
100 CAPSULE ORAL EVERY 12 HOURS SCHEDULED
Status: CANCELLED | OUTPATIENT
Start: 2020-10-17

## 2020-10-17 RX ORDER — MAGNESIUM SULFATE 1 G/100ML
1 INJECTION INTRAVENOUS PRN
Status: CANCELLED | OUTPATIENT
Start: 2020-10-17

## 2020-10-17 RX ORDER — OXYCODONE HYDROCHLORIDE AND ACETAMINOPHEN 5; 325 MG/1; MG/1
1 TABLET ORAL EVERY 4 HOURS PRN
Status: DISCONTINUED | OUTPATIENT
Start: 2020-10-17 | End: 2020-10-17 | Stop reason: HOSPADM

## 2020-10-17 RX ORDER — HYDROCODONE BITARTRATE AND ACETAMINOPHEN 5; 325 MG/1; MG/1
1 TABLET ORAL EVERY 6 HOURS PRN
Status: ON HOLD | COMMUNITY
End: 2020-10-21 | Stop reason: HOSPADM

## 2020-10-17 RX ORDER — ASPIRIN 81 MG/1
81 TABLET, CHEWABLE ORAL DAILY
Status: DISCONTINUED | OUTPATIENT
Start: 2020-10-18 | End: 2020-10-21 | Stop reason: HOSPADM

## 2020-10-17 RX ORDER — MAGNESIUM SULFATE 1 G/100ML
1 INJECTION INTRAVENOUS PRN
Status: DISCONTINUED | OUTPATIENT
Start: 2020-10-17 | End: 2020-10-17 | Stop reason: HOSPADM

## 2020-10-17 RX ORDER — ACETAMINOPHEN 650 MG/1
650 SUPPOSITORY RECTAL EVERY 6 HOURS PRN
Status: DISCONTINUED | OUTPATIENT
Start: 2020-10-17 | End: 2020-10-17 | Stop reason: HOSPADM

## 2020-10-17 RX ORDER — LOSARTAN POTASSIUM 25 MG/1
25 TABLET ORAL DAILY
Status: CANCELLED | OUTPATIENT
Start: 2020-10-18

## 2020-10-17 RX ORDER — LANTHANUM CARBONATE 500 MG/1
1000 TABLET, CHEWABLE ORAL
Status: DISCONTINUED | OUTPATIENT
Start: 2020-10-17 | End: 2020-10-17 | Stop reason: HOSPADM

## 2020-10-17 RX ORDER — ONDANSETRON 2 MG/ML
4 INJECTION INTRAMUSCULAR; INTRAVENOUS EVERY 6 HOURS PRN
Status: DISCONTINUED | OUTPATIENT
Start: 2020-10-17 | End: 2020-10-17 | Stop reason: HOSPADM

## 2020-10-17 RX ORDER — POTASSIUM CHLORIDE 20 MEQ/1
40 TABLET, EXTENDED RELEASE ORAL PRN
Status: DISCONTINUED | OUTPATIENT
Start: 2020-10-17 | End: 2020-10-17 | Stop reason: HOSPADM

## 2020-10-17 RX ORDER — ACETAMINOPHEN 325 MG/1
650 TABLET ORAL EVERY 6 HOURS PRN
Status: CANCELLED | OUTPATIENT
Start: 2020-10-17

## 2020-10-17 RX ORDER — LOSARTAN POTASSIUM 25 MG/1
1 TABLET ORAL DAILY
Status: DISCONTINUED | OUTPATIENT
Start: 2020-10-17 | End: 2020-10-17 | Stop reason: HOSPADM

## 2020-10-17 RX ORDER — POLYETHYLENE GLYCOL 3350 17 G/17G
17 POWDER, FOR SOLUTION ORAL DAILY PRN
Status: DISCONTINUED | OUTPATIENT
Start: 2020-10-17 | End: 2020-10-17 | Stop reason: HOSPADM

## 2020-10-17 RX ORDER — ACETAMINOPHEN 650 MG/1
650 SUPPOSITORY RECTAL EVERY 6 HOURS PRN
Status: DISCONTINUED | OUTPATIENT
Start: 2020-10-17 | End: 2020-10-21 | Stop reason: HOSPADM

## 2020-10-17 RX ORDER — METOPROLOL SUCCINATE 25 MG/1
25 TABLET, EXTENDED RELEASE ORAL DAILY
Status: DISCONTINUED | OUTPATIENT
Start: 2020-10-18 | End: 2020-10-18

## 2020-10-17 RX ORDER — MAGNESIUM SULFATE 1 G/100ML
1 INJECTION INTRAVENOUS PRN
Status: DISCONTINUED | OUTPATIENT
Start: 2020-10-17 | End: 2020-10-19

## 2020-10-17 RX ORDER — POTASSIUM CHLORIDE 7.45 MG/ML
10 INJECTION INTRAVENOUS PRN
Status: CANCELLED | OUTPATIENT
Start: 2020-10-17

## 2020-10-17 RX ORDER — OXYCODONE HYDROCHLORIDE AND ACETAMINOPHEN 5; 325 MG/1; MG/1
1 TABLET ORAL EVERY 4 HOURS PRN
Status: CANCELLED | OUTPATIENT
Start: 2020-10-17

## 2020-10-17 RX ORDER — FOLIC ACID/VIT B COMPLEX AND C 0.8 MG
1 TABLET ORAL DAILY
Status: CANCELLED | OUTPATIENT
Start: 2020-10-18

## 2020-10-17 RX ORDER — SODIUM CHLORIDE 0.9 % (FLUSH) 0.9 %
10 SYRINGE (ML) INJECTION EVERY 12 HOURS SCHEDULED
Status: DISCONTINUED | OUTPATIENT
Start: 2020-10-17 | End: 2020-10-21 | Stop reason: HOSPADM

## 2020-10-17 RX ORDER — METHOCARBAMOL 750 MG/1
750 TABLET, FILM COATED ORAL 3 TIMES DAILY
Status: CANCELLED | OUTPATIENT
Start: 2020-10-17

## 2020-10-17 RX ORDER — CHOLECALCIFEROL (VITAMIN D3) 10 MCG
1 TABLET ORAL DAILY
Status: DISCONTINUED | OUTPATIENT
Start: 2020-10-18 | End: 2020-10-21 | Stop reason: HOSPADM

## 2020-10-17 RX ORDER — LANTHANUM CARBONATE 500 MG/1
1000 TABLET, CHEWABLE ORAL
Status: CANCELLED | OUTPATIENT
Start: 2020-10-17

## 2020-10-17 RX ORDER — MIDODRINE HYDROCHLORIDE 10 MG/1
10 TABLET ORAL PRN
Status: CANCELLED | OUTPATIENT
Start: 2020-10-17

## 2020-10-17 RX ORDER — PROMETHAZINE HYDROCHLORIDE 25 MG/1
12.5 TABLET ORAL EVERY 6 HOURS PRN
Status: DISCONTINUED | OUTPATIENT
Start: 2020-10-17 | End: 2020-10-17 | Stop reason: HOSPADM

## 2020-10-17 RX ORDER — SODIUM CHLORIDE 0.9 % (FLUSH) 0.9 %
10 SYRINGE (ML) INJECTION EVERY 12 HOURS SCHEDULED
Status: CANCELLED | OUTPATIENT
Start: 2020-10-17

## 2020-10-17 RX ORDER — FOLIC ACID/VIT B COMPLEX AND C 0.8 MG
1 TABLET ORAL DAILY
Status: DISCONTINUED | OUTPATIENT
Start: 2020-10-17 | End: 2020-10-17 | Stop reason: HOSPADM

## 2020-10-17 RX ORDER — METOPROLOL SUCCINATE 25 MG/1
25 TABLET, EXTENDED RELEASE ORAL DAILY
Status: CANCELLED | OUTPATIENT
Start: 2020-10-18

## 2020-10-17 RX ORDER — POTASSIUM CHLORIDE 20 MEQ/1
40 TABLET, EXTENDED RELEASE ORAL PRN
Status: DISCONTINUED | OUTPATIENT
Start: 2020-10-17 | End: 2020-10-19

## 2020-10-17 RX ORDER — SODIUM CHLORIDE 0.9 % (FLUSH) 0.9 %
10 SYRINGE (ML) INJECTION PRN
Status: CANCELLED | OUTPATIENT
Start: 2020-10-17

## 2020-10-17 RX ORDER — SODIUM CHLORIDE 0.9 % (FLUSH) 0.9 %
10 SYRINGE (ML) INJECTION PRN
Status: DISCONTINUED | OUTPATIENT
Start: 2020-10-17 | End: 2020-10-21 | Stop reason: HOSPADM

## 2020-10-17 RX ORDER — METHYLPREDNISOLONE 4 MG/1
2 TABLET ORAL DAILY
Status: CANCELLED | OUTPATIENT
Start: 2020-10-18

## 2020-10-17 RX ORDER — NICOTINE 21 MG/24HR
1 PATCH, TRANSDERMAL 24 HOURS TRANSDERMAL DAILY PRN
Status: DISCONTINUED | OUTPATIENT
Start: 2020-10-17 | End: 2020-10-21 | Stop reason: HOSPADM

## 2020-10-17 RX ADMIN — METHOCARBAMOL TABLETS 750 MG: 750 TABLET, COATED ORAL at 22:17

## 2020-10-17 RX ADMIN — ASPIRIN 81 MG: 81 TABLET, CHEWABLE ORAL at 13:00

## 2020-10-17 RX ADMIN — FAMOTIDINE 20 MG: 20 TABLET, FILM COATED ORAL at 13:00

## 2020-10-17 RX ADMIN — APIXABAN 5 MG: 5 TABLET, FILM COATED ORAL at 13:00

## 2020-10-17 RX ADMIN — LANTHANUM CARBONATE 1000 MG: 500 TABLET, CHEWABLE ORAL at 19:04

## 2020-10-17 RX ADMIN — SODIUM CHLORIDE, PRESERVATIVE FREE 10 ML: 5 INJECTION INTRAVENOUS at 22:35

## 2020-10-17 RX ADMIN — ACETAMINOPHEN 650 MG: 325 TABLET ORAL at 08:24

## 2020-10-17 RX ADMIN — ACETAMINOPHEN 650 MG: 325 TABLET ORAL at 22:07

## 2020-10-17 RX ADMIN — SODIUM CHLORIDE 500 ML: 9 INJECTION, SOLUTION INTRAVENOUS at 06:03

## 2020-10-17 RX ADMIN — ONDANSETRON 4 MG: 2 INJECTION INTRAMUSCULAR; INTRAVENOUS at 07:06

## 2020-10-17 ASSESSMENT — PAIN SCALES - GENERAL
PAINLEVEL_OUTOF10: 7
PAINLEVEL_OUTOF10: 7
PAINLEVEL_OUTOF10: 0
PAINLEVEL_OUTOF10: 0

## 2020-10-17 ASSESSMENT — PAIN DESCRIPTION - ORIENTATION: ORIENTATION: LOWER

## 2020-10-17 ASSESSMENT — ENCOUNTER SYMPTOMS
NAUSEA: 1
DIARRHEA: 1
VOMITING: 1

## 2020-10-17 ASSESSMENT — PAIN DESCRIPTION - LOCATION: LOCATION: BACK

## 2020-10-17 NOTE — PROGRESS NOTES
HEMODIALYSIS PRE-TREATMENT NOTE    Patient Identifiers prior to treatment: name and MRN    Isolation Required: np                      Isolation Type: no       (please document if patient is being managed as a PUI/COVID-19 patient)        Hepatitis status:                           Date Drawn                             Result  Hepatitis B Surface Antigen 10/13/2020     neg                     Hepatitis B Surface Antibody 11/12/2019 neg        Hepatitis B Core Antibody 06/17/2014 neg          How was Hepatitis Status verified: [facility]     Was a copy of the labs you documented provided to facility for the patient's chart: [facility]    Hemodialysis orders verified: yes    Access Within normal limits ( I.e. s/s of infection,...): none    Pre-Assessment completed: yes    Pre-dialysis report received from:  Zaire Henson RN                      Time:  1330 p

## 2020-10-17 NOTE — ED NOTES
Bed: 21  Expected date:   Expected time:   Means of arrival:   Comments:  MEDIC [facility]  10/17/20 2681

## 2020-10-17 NOTE — ED NOTES
Pt returned from dialysis. Pt currently has no complaints. Updated pt that he will be transferred to [facility]. V's around 10pm. Pt is showing no s/s of distress. Respirations even and unlabored. Pt refusing dinner tray at this time. Pt given jello. Will continue to monitor.       Smiley Brantley RN  10/17/20 3425

## 2020-10-17 NOTE — ED PROVIDER NOTES
EMERGENCY DEPARTMENT ENCOUNTER    Pt Name: Hardeep Barrera  MRN: [id number]  [place] 1960  Date of evaluation: 10/17/20  CHIEF COMPLAINT       Chief Complaint   Patient presents with    Nausea    Emesis    Fever    Diarrhea     HISTORY OF PRESENT ILLNESS   60-year-old male presents to the emergency room for nausea vomiting diarrhea generalized malaise. Patient is coming in by squad. Patient was reporting to dialysis today when they asked about symptoms and he reported this he was sent to the emergency room. Patient noted to have fever here in the ED. Patient does report taking Tylenol earlier this morning for some increased back pain. Patient reports he is keeping down fluids. Nausea is intermittent. He has had 3 of episodes of diarrhea today which just started. REVIEW OF SYSTEMS     Review of Systems   Constitutional: Positive for fatigue and fever. Negative for activity change, chills and diaphoresis. HENT: Negative for congestion, sinus pain and tinnitus. Eyes: Negative. Respiratory: Negative for apnea, chest tightness and shortness of breath. Gastrointestinal: Positive for diarrhea, nausea and vomiting. Negative for abdominal distention and constipation. Genitourinary: Negative for difficulty urinating and frequency. Musculoskeletal: Positive for back pain and myalgias. Negative for arthralgias. Skin: Negative for color change and rash. Neurological: Negative for dizziness. Hematological: Negative. Psychiatric/Behavioral: Negative.         PASTMEDICAL HISTORY     Past Medical History:   Diagnosis Date    A-V fistula ([address]     lt arm    AICD (automatic cardioverter/defibrillator) present 2006    Asthma     Atrial fibrillation ([address]     Blood transfusion reaction     CAD (coronary artery disease)     dr Ralph Brenner cardiologist recently aicd check /clearance 4/15    CHF (congestive heart failure) ([address]     CHF (congestive heart failure) ([address]     COPD [facility]chronic obstructive pulmonary disease) [facility]     CRF (chronic renal failure)     Diabetes mellitus ([facility]     IDDM    Dialysis patient ([facility]     GERD (gastroesophageal reflux disease)     Hemodialysis patient ([facility]     amanda [id number]-thurs-sat    Hypertension     treated w/ meds    Obesity      Past Problem List  Patient Active Problem List   Diagnosis Code    Biventricular CHF (congestive heart failure) ([place] [address] with icd chronic  I50.82    A-fib ([place] [address] I48.91    DM (diabetes mellitus) type II controlled with renal manifestation (HCC) E11.29    Pulmonary emboli (HCC) rt lower lobe in 8/15 with negative venous doppler and tt since august 14 - with therapeutic inr  I26.99    ESRD (end stage renal disease) on dialysis ([facility] N18.6, Z99.2    Leukocytosis D72.829    Tachycardia R00.0    S/P cholecystectomy Z90.49    Cardiomyopathy ([place] [address] I42.9    Anemia of chronic disease D63.8     SURGICAL HISTORY       Past Surgical History:   Procedure Laterality Date    CARDIAC DEFIBRILLATOR PLACEMENT  2006    PACEMAKER PLACEMENT      VASCULAR SURGERY      L avf     CURRENT MEDICATIONS       Previous Medications    ASPIRIN 81 MG CHEWABLE TABLET    Take 81 mg by mouth daily    B COMPLEX-C-FOLIC ACID (LOLA-GRACE) TABS    TAKE 1 TABLET BY MOUTH EVERY EVENING    DOXYCYCLINE HYCLATE (VIBRAMYCIN) 100 MG CAPSULE    Take 100 mg by mouth 2 times daily    ELIQUIS 5 MG TABS TABLET    TAKE ONE TABLET BY MOUTH TWICE DAILY    HYDROCODONE-ACETAMINOPHEN (NORCO) 5-325 MG PER TABLET    Take 1 tablet by mouth every 6 hours as needed for Pain.     LANTHANUM (FOSRENOL) 1000 MG CHEWABLE TABLET    CHEW AND SWALLOW 1 TABLET THREE TIMES A DAY WITH MEALS    LOSARTAN (COZAAR) 25 MG TABLET    TAKE ONE TABLET BY MOUTH EVERY DAY    METHOCARBAMOL (ROBAXIN) 750 MG TABLET    Take 750 mg by mouth 3 times daily    METHYLPREDNISOLONE (MEDROL) 4 MG TABLET    Take 2 mg by mouth daily    METOPROLOL SUCCINATE (TOPROL XL) 25 MG EXTENDED RELEASE TABLET    TAKE ONE TABLET BY MOUTH EVERY DAY    MIDODRINE (PROAMATINE) 5 MG TABLET    Take 10 mg by mouth as needed    OXYCODONE-ACETAMINOPHEN (PERCOCET) 5-325 MG PER TABLET    TAKE ONE TABLET BY MOUTH EVERY 4 HOURS AS NEEDED     ALLERGIES     is allergic to spironolactone. FAMILY HISTORY     is adopted. SOCIAL HISTORY       Social History     Tobacco Use    Smoking status: Never Smoker    Smokeless tobacco: Never Used   Substance Use Topics    Alcohol use: No    Drug use: No     PHYSICAL EXAM     INITIAL VITALS: /67   Pulse 115   Temp 102.7 °F (39.3 °C) (Oral)   Resp 16   Ht 6' 1\" (1.854 m)   Wt 274 lb (124.3 kg)   SpO2 97%   BMI 36.15 kg/m²    Physical Exam  Constitutional:       Appearance: Normal appearance. HENT:      Head: Normocephalic. Nose: Nose normal.   Neck:      Musculoskeletal: Neck supple. Cardiovascular:      Rate and Rhythm: Tachycardia present. Pulmonary:      Effort: Pulmonary effort is normal. No respiratory distress. Breath sounds: Normal breath sounds. Abdominal:      General: Abdomen is flat. Palpations: Abdomen is soft. Neurological:      General: No focal deficit present. Mental Status: He is alert and oriented to person, place, and time. Psychiatric:         Mood and Affect: Mood normal.         MEDICAL DECISION MAKIN-year-old male coming into the emergency room after he attempted to go to dialysis today and was noted to have fever. Patient endorses nausea vomiting diarrhea and myalgias over the last several days. Patient does not have elevated white count here in the emergency room. Patient is febrile and slightly tachycardic upon initial presentation. Disposition is pending final blood work and whether patient will be able to get dialysis today outpatient.  Case sig    CRITICAL CARE:       PROCEDURES:    Procedures    DIAGNOSTIC RESULTS   EKG:All EKG's are interpreted by the Emergency Department Physician who either signs or Co-signs this chart in the chloride bolus     CONSULTS:  None    FINAL IMPRESSION    No diagnosis found. DISPOSITION/PLAN   DISPOSITION        PATIENT REFERRED TO:  No follow-up provider specified.   DISCHARGE MEDICATIONS:  New Prescriptions    No medications on file     Dereck Gaytan MD  Attending Emergency Physician                 Ezequiel Randolph MD  10/21/20 3047 [id number]

## 2020-10-17 NOTE — PROGRESS NOTES
Direct oral anticoagulant (DOAC) - Initial Pharmacy Review  PLAN    No obvious intervention needed. Lana Zavala  10/17/2020  12:05 PM    ASSESSMENT   Patient chart reviewed for indication and appropriateness of dose and therapy of Apixaban.:  Indication: AFib. PATIENT INFORMATION   Body mass index is 36.15 kg/m². Wt Readings from Last 1 Encounters:   10/17/20 274 lb (124.3 kg)     Some sources recommend that DOACs be avoided in weight < 50 or > 120 kg, or BMI > 40 whenever possible; however. some smaller studies suggest that DOACs may be safe and efficacious in this population. Recent Labs     10/17/20  0536   CREATININE 11.64*     Recent Labs     10/17/20  0536   HGB 9.7*   HCT 30.2*        No results for input(s): INR in the last 72 hours. Weight  kg ? BMI Less than   40 kg/m2 Calculated CrCl  Using ABW (mL/min) Other anticoagulants on MAR Drug interactions  (since admission) reviewed   no    Wt Readings from Last 1 Encounters:   10/17/20 274 lb (124.3 kg)    yes    Body mass index is 36.15 kg/m². Apixaban for Afib is reviewed separately        (140-age)*ABW    72 * sCr    X 0.85 for females No concurrent anticoagulants ordered.  No interactions/no new drug interactions identified requiring action.                 -----------------------------------------------------------------------------------------------------------------    CRCL Calculation for DOACs  (use ABW)    CrCl male:  (140-Age) * ABW           For female:  (140-Age) * ABW * 0.85                       72  *  sCr                                              72 * sCr           Apixaban (Eliquis)  Indication Dose (Oral) Renal Adjustment (CrCl calculated on ABW) Select Drug Interactions   NVAF 5mg BID 2.5mg BID  IF patient has TWO of the following:  Age>80, Weight <60 kg, SCr > 1.5  (If dialysis, but not meeting above criteria, keep 5 mg BID*)   Strong P-gp/CY inducers (Avoid combination)  Apalutamide, CarBAMazepine, Enzalutamide, Fosphenytoin, Lumacaftor, Mitotane, PHENobarbital, Phenytoin, Primidone, RifAMPin    P-gp/CY inhibitors (Avoid use or dose adjustment)  Clarithromycin, Itraconazole, Ketoconazole (systemic), Ombitasvir, Paritaprevir, Ritonavir       DVT/PE  Treatment 10mg BID x7d, then 5mg BID No dosage adjustment necessary; however, patients with a serum creatinine >2.5 mg/dL or CrCl <25 mL/minute (as determined by Cockcroft-Gault equation) were excluded from the clinical trials*    DVT/PE  Recurrence Preventions 5mg BID (or potentially 2.5mg BID after at least 6 months of treatment)     DVT prevention PostOp Knee: 2.5mg BID x 12d  Hip: 2.5mg BID x 35d No dosage adjustment necessary; however, patients with either clinically significant renal impairment, impaired renal function, or CrCl <30 mL/minute were excluded from the respective clinical trials. For patients receiving dual strong CY and P-glycoprotein inhibitors (eg, clarithromycin, ketoconazole, itraconazole, ritonavir) and apixaban doses >2.5 mg twice daily, reduce apixaban dose by 50%   *(Annamarie, 2013a; Annamarie, 2013b)            * Elvia TA, et al. J Am Soc Nephrol 2017. doi:10.1681/ASN. [id number]  *Adrianne COREA, et al. Circulation 2018.  PGK:01.2833/AKHCRTUTYTNKAR  Ana Cristina Chahal et al. [facility] 2019  Judy Morrison M, et al. Blood 5291;[id number]  Olga Allen, et al. CHEST 2018  Skinnyleoncio Goodwin, et al. [place] Pharmacother 2019  [facility]/DEB et al. Circulation 2018

## 2020-10-17 NOTE — DISCHARGE SUMMARY
[facility]  Office: [address], DO, Mi Pierre, DO, Lidia Fuchs, DO, Flash Callejas, DO, Kim Sanchez MD, Karli Gallo MD, Madeline Perry MD, Aide Bean MD, Reanna Ahmadi MD, Brenda Nguyen MD, Ching Pinto MD, Karlee Guardado MD, Prabhjot Galindo MD, Benny Chester DO, Dagoberto Powers MD, Sharlene Parnell MD, Trevor Grewal DO, Wanda Berman MD,  Tai Morris, DO, Vasile Garza MD, Ananya Vásquez MD, Blade Neil [facility], [facility], [facility], Chasidy Frey, CNP, Ashish Bird, CNS, Albaro Dash, CNP, Figueroa Back, CNP, Dexter Walden, CNP, Dionicia Frankel, [facility], Brynn Andino, CNP, Mary Canseco PA-C, Zaire Miller, [facility], Reymundo Becerra, CNP, Lobo Up, CNP, Jluis Maldonado, CNP, Ck Dc, CNP, Ashley Lyles, [facility]   [facility]    Discharge Summary     Patient ID: Maryam Santizo  :  1960   MRN: [id number]     ACCOUNT:  [de-identified]   Patient's PCP: No primary care provider on file. Admit Date: 10/17/2020   Discharge Date: 10/17/2020    Length of Stay: 0  Code Status:  Prior  Admitting Physician: No admitting provider for patient encounter. Discharge Physician: Kathy Perez MD     Active Discharge Diagnoses:     Hospital Problem Lists:  Principal Problem:    Fever of unknown origin  Active Problems:    Biventricular CHF (congestive heart failure) ([facility] [address] with icd chronic     DM (diabetes mellitus) type II controlled with renal manifestation (HCC)    ESRD (end stage renal disease) on dialysis ([facility] [address]    Cardiomyopathy ([facility] [address]    Anemia of chronic disease  Resolved Problems:    * No resolved hospital problems. *      Admission Condition:  fair     Discharged Condition: fair    Hospital Stay:     Hospital Course:   Maryam Santizo is a 63 y.o.  male presents to the emergency room for nausea vomiting diarrhea generalized malaise.  Patient is coming in by squad. Prince Jules was reporting to dialysis today when they asked about symptoms and he reported this he was sent to the emergency room.  Patient noted to have fever here in the ED.  Patient does report taking Tylenol earlier this morning for some increased back pain.  Patient reports he is keeping down fluids.  Nausea is intermittent. Clau Dudley has had 3 of episodes of diarrhea today which just started. Patient states he is having nausea since Thursday, denies any chills, no diarrhea after coming to ER  Denies any cough or expectoration  He makes only few drops of urine occasionally  He is a dialysis fistula in right arm  COVID-19 test is negative    Plan:      Patient status inpatient in the Progressive Unit/Step down     1. Resume home medications including Eliquis  2. Blood cultures have already been drawn  3. Urinalysis if able to void  4. Repeat chest x-ray in the morning  5. Already on doxycycline apparently started 2 days back, will continue it  6. GI prophylaxis  7. Consult nephrology  8. Will need ID consult if no source of fever is found  9. Monitor daily labs  10.  I was later informed that there are no beds available in hospital, due to this patient was transferred to [place]          Significant therapeutic interventions: Resume home medicines, blood cultures drawn, supportive and symptomatic    Significant Diagnostic Studies:   Labs / Micro:  CBC:   Lab Results   Component Value Date    WBC 9.0 10/17/2020    RBC 3.23 10/17/2020    HGB 9.7 10/17/2020    HCT 30.2 10/17/2020    MCV 93.5 10/17/2020    MCH 30.0 10/17/2020    MCHC 32.1 10/17/2020    RDW 14.1 10/17/2020     10/17/2020     BMP:    Lab Results   Component Value Date    GLUCOSE 93 10/17/2020     10/17/2020    K 4.8 10/17/2020    CL 92 10/17/2020    CO2 27 10/17/2020    ANIONGAP 15 10/17/2020    BUN 38 10/17/2020    CREATININE 11.64 10/17/2020    BUNCRER 3 10/17/2020    CALCIUM 9.4 10/17/2020    LABGLOM 5 10/17/2020    GFRAA 5 10/17/2020    GFR      10/17/2020    GFR NOT REPORTED 10/17/2020     HFP:    Lab Results   Component Value Date    PROT 8.5 10/17/2020     CMP:    Lab Results   Component Value Date    GLUCOSE 93 10/17/2020     10/17/2020    K 4.8 10/17/2020    CL 92 10/17/2020    CO2 27 10/17/2020    BUN 38 10/17/2020    CREATININE 11.64 10/17/2020    ANIONGAP 15 10/17/2020    ALKPHOS 76 10/17/2020    ALT 21 10/17/2020    AST 24 10/17/2020    BILITOT 0.51 10/17/2020    LABALBU 3.9 10/17/2020    ALBUMIN NOT REPORTED 10/17/2020    LABGLOM 5 10/17/2020    GFRAA 5 10/17/2020    GFR      10/17/2020    GFR NOT REPORTED 10/17/2020    PROT 8.5 10/17/2020    CALCIUM 9.4 10/17/2020     PT/INR:    Lab Results   Component Value Date    PROTIME 12.3 09/21/2019    PROTIME 36.2 05/18/2015    INR 1.2 09/21/2019     PTT:   Lab Results   Component Value Date    APTT 26.4 09/21/2019     FLP:    Lab Results   Component Value Date    CHOL 151 02/02/2014    TRIG 230 04/11/2015    HDL 29 02/02/2014     U/A:  No results found for: COLORU, TURBIDITY, SPECGRAV, HGBUR, PHUR, PROTEINU, GLUCOSEU, KETUA, BILIRUBINUR, UROBILINOGEN, NITRU, LEUKOCYTESUR  TSH:    Lab Results   Component Value Date    TSH 1.11 01/30/2014        Radiology:  Xr Chest Portable    Result Date: 10/17/2020  Interval removal of right-sided double lumen catheter. Chronic elevation the right hemidiaphragm. No acute cardiopulmonary process. Consultations:    Consults:     Final Specialist Recommendations/Findings:   IP CONSULT TO HOSPITALIST  IP CONSULT TO NEPHROLOGY  IP CONSULT TO HOSPITALIST  IP CONSULT TO NEPHROLOGY      The patient was seen and examined on day of discharge and this discharge summary is in conjunction with any daily progress note from day of discharge. Discharge plan:     Disposition: Transfer to [place]    Physician Follow Up:    [facility] hospitalist group will follow patient is admitted      Diet: cardiac diet and diabetic diet    Activity: As tolerated        Discharge Medications: Medication List      ASK your doctor about these medications    aspirin 81 MG chewable tablet     doxycycline hyclate 100 MG capsule  Commonly known as:  VIBRAMYCIN     Eliquis 5 MG Tabs tablet  Generic drug:  apixaban     HYDROcodone-acetaminophen 5-325 MG per tablet  Commonly known as:  NORCO     lanthanum 1000 MG chewable tablet  Commonly known as:  FOSRENOL     losartan 25 MG tablet  Commonly known as:  COZAAR     methocarbamol 750 MG tablet  Commonly known as:  ROBAXIN     methylPREDNISolone 4 MG tablet  Commonly known as:  MEDROL     metoprolol succinate 25 MG extended release tablet  Commonly known as:  TOPROL XL     midodrine 5 MG tablet  Commonly known as:  PROAMATINE     oxyCODONE-acetaminophen 5-325 MG per tablet  Commonly known as:  PERCOCET     chrissie-pito Tabs            No discharge procedures on file. Time Spent on discharge is  31 mins in patient examination, evaluation, counseling as well as medication reconciliation, prescriptions for required medications, discharge plan and follow up. Electronically signed by   Mariaa Navarrete MD  10/17/2020  3:27 PM      Thank you Dr. Ivan Bradshaw primary care provider on file. for the opportunity to be involved in this patient's care.

## 2020-10-17 NOTE — PROGRESS NOTES
HEMODIALYSIS POST TREATMENT NOTE    Treatment time ordered: 4.5 hours    Actual treatment time: 4.5 hours    UltraFiltration Goal: 2500 ml  UltraFiltration Removed: 2309 ml      Pre Treatment weight: 124.6 kg  Post Treatment weight: 122.8 kg  Estimated Dry Weight: 122.5 kg    Access used:     Central Venous Catheter: n/a     Internal Access: right forearm, site unremarkable       Access Flow: great     Sign and symptoms of infection: none       If YES: no    Medications or blood products given: none    Regular outpatient schedule: TTS    Summary of response to treatment: pt tolerated treatment well, pt taken off treatment 15 mins early due to chills and increased heart rate    Explain if orders NOT met, was physician notified:no      ACES flowsheet faxed to patient unit/ placed in patient chart: yes    Post assessment completed: yes    Report given to: Marilyn Reyez RN (ER nurse)      * Intra-treatment documented Safety Checks include the followin) Access and face visible at all times. 2) All connections and blood lines are secure with no kinks. 3) NVL alarm engaged. 4) Hemosafe device applied (if applicable). 5) No collapse of Arterial or Venous blood chambers. 6) All blood lines / pump segments in the air detectors.

## 2020-10-17 NOTE — H&P
[facility]  Office: [address], DO, Mi Pierre, DO, Lidia Fuchs, DO, Flash Leighton Blood, DO, Kim Sanchez MD, Karli Gallo MD, Madeline Perry MD, Aide Bean MD, Reanna Ahmadi MD, Brenda Nguyen MD, Ching Pinto MD, Karlee Guardado MD, Prabhjot Galindo MD, Benny Chester, DO, Dagoberto Powers MD, Sharlene Parnell MD, Trevor Grewal DO, Wanda Berman MD,  Tai Morris, DO, Vasile Garza MD, Ananya Vásquez MD, Blade Neil, [facility], [facility], [facility], Chasidy Frey, CNP, Ashish Bird, CNS, Albaro Dash, CNP, Figueroa Back, CNP, Dexter Walden, CNP, Dionicia Frankel, CNP, Brynn Andino, CNP, Mary Canseco PA-C, Zaire Miller, [facility], Reymundo Becerra, CNP, Lobo Up, CNP, Jluis Maldonado, CNP, Ck Dc, CNP, Ashley Lyles, CNP         [address]    HISTORY AND PHYSICAL EXAMINATION            Date:   10/17/2020  Patient name:  Maryam Santizo  Date of admission:  10/17/2020  5:27 AM  MRN:   [id number]  Account:  [de-identified]  YOB: 1960  PCP:    No primary care provider on file. Room:   [place] [address]  Code Status:    Prior    Chief Complaint:     Chief Complaint   Patient presents with    Nausea    Emesis    Fever    Diarrhea       History Obtained From:     patient, electronic medical record    History of Present Illness:   Admitted through ER with following information:    Maryam Santizo is a 63 y.o.  male presents to the emergency room for nausea vomiting diarrhea generalized malaise. Patient is coming in by squad. Patient was reporting to dialysis today when they asked about symptoms and he reported this he was sent to the emergency room. Patient noted to have fever here in the ED. Patient does report taking Tylenol earlier this morning for some increased back pain. Patient reports he is keeping down fluids. Nausea is intermittent. He has had 3 of episodes of diarrhea today which just started.   Patient states he is having nausea since Thursday, denies any chills, no diarrhea after coming to ER  Denies any cough or expectoration  He makes only few drops of urine occasionally  He is a dialysis fistula in right arm  COVID-19 test is negative  Past Medical History:     Past Medical History:   Diagnosis Date    A-V fistula ([facility] [address]     lt arm    AICD (automatic cardioverter/defibrillator) present 2006    Asthma     Atrial fibrillation ([facility] [address]     Blood transfusion reaction     CAD (coronary artery disease)     dr Ralph Brenner cardiologist recently aicd check /clearance 4/15    CHF (congestive heart failure) ([facility] [address]     CHF (congestive heart failure) ([facility] [address]     COPD (chronic obstructive pulmonary disease) ([facility] [address]     CRF (chronic renal failure)     Diabetes mellitus ([facility] [address]     IDDM    Dialysis patient ([facility] [address]     GERD (gastroesophageal reflux disease)     [facility]     laskey tues-thurs-sat    Hypertension     treated w/ meds    Obesity         Past Surgical History:     Past Surgical History:   Procedure Laterality Date    CARDIAC DEFIBRILLATOR PLACEMENT  2006    PACEMAKER PLACEMENT      VASCULAR SURGERY      L avf        Medications Prior to Admission:     Prior to Admission medications    Medication Sig Start Date End Date Taking? Authorizing Provider   doxycycline hyclate (VIBRAMYCIN) 100 MG capsule Take 100 mg by mouth 2 times daily   Yes Historical Provider, MD   HYDROcodone-acetaminophen (NORCO) 5-325 MG per tablet Take 1 tablet by mouth every 6 hours as needed for Pain.    Yes Historical Provider, MD   methocarbamol (ROBAXIN) 750 MG tablet Take 750 mg by mouth 3 times daily   Yes Historical Provider, MD   methylPREDNISolone (MEDROL) 4 MG tablet Take 2 mg by mouth daily   Yes Historical Provider, MD   ELIQUIS 5 MG TABS tablet TAKE ONE TABLET BY MOUTH TWICE DAILY 7/29/19   Historical Provider, MD   lanthanum (FOSRENOL) 1000 MG chewable tablet CHEW AND SWALLOW 1 TABLET THREE TIMES A DAY WITH MEALS 7/15/19   Historical Provider, MD   losartan (COZAAR) 25 MG tablet TAKE ONE TABLET BY MOUTH EVERY DAY 7/3/19   Historical Provider, MD   metoprolol succinate (TOPROL XL) 25 MG extended release tablet TAKE ONE TABLET BY MOUTH EVERY DAY 7/3/19   Historical Provider, MD   oxyCODONE-acetaminophen (PERCOCET) 5-325 MG per tablet TAKE ONE TABLET BY MOUTH EVERY 4 HOURS AS NEEDED 8/12/19   Historical Provider, MD LOPEZ Complex-C-Folic Acid (LOLA-GRACE) TABS TAKE 1 TABLET BY MOUTH EVERY EVENING 6/5/19   Historical Provider, MD   aspirin 81 MG chewable tablet Take 81 mg by mouth daily    Historical Provider, MD   midodrine (PROAMATINE) 5 MG tablet Take 10 mg by mouth as needed    Historical Provider, MD        Allergies:     Spironolactone    Social History:     Tobacco:    reports that he has never smoked. He has never used smokeless tobacco.  Alcohol:      reports no history of alcohol use. Drug Use:  reports no history of drug use. Family History:     Family History   Adopted: Yes       Review of Systems:     Positive and Negative as described in HPI.     CONSTITUTIONAL:  + for fevers, malaise, denies chills, sweats,+ fatigue,   HEENT:  negative for vision, hearing changes, runny nose, throat pain  RESPIRATORY:  negative for shortness of breath, cough, congestion, wheezing  CARDIOVASCULAR:  negative for chest pain, palpitations  GASTROINTESTINAL:  negative for nausea, vomiting, diarrhea, constipation, change in bowel habits, abdominal pain   GENITOURINARY: Makes only few drops of urine occasionally  INTEGUMENT:  negative for rash, skin lesions, easy bruising   HEMATOLOGIC/LYMPHATIC:  negative for swelling/edema   ALLERGIC/IMMUNOLOGIC:  negative for urticaria , itching  ENDOCRINE:  negative increase in drinking, increase in urination, hot or cold intolerance  MUSCULOSKELETAL:  negative joint pains, muscle aches, swelling of joints  NEUROLOGICAL:  negative for headaches, dizziness, lightheadedness, numbness, pain, tingling extremities  BEHAVIOR/PSYCH:  negative for depression, anxiety    Physical Exam:   /67   Pulse 115   Temp 99.3 °F (37.4 °C) (Oral)   Resp 16   Ht 6' 1\" (1.854 m)   Wt 274 lb (124.3 kg)   SpO2 97%   BMI 36.15 kg/m²   Temp (24hrs), Av.8 °F (38.2 °C), Min:99.3 °F (37.4 °C), Max:102.7 °F (39.3 °C)    No results for input(s): POCGLU in the last 72 hours.   No intake or output data in the 24 hours ending 10/17/20 1121    General Appearance:  alert, well appearing, and in no acute distress, morbidly obese  Mental status: oriented to person, place, and time  Head:  normocephalic, atraumatic  Eye: no icterus, redness, pupils equal and reactive, extraocular eye movements intact, conjunctiva clear  Ear: normal external ear, no discharge, hearing intact  Nose:  no drainage noted  Mouth: mucous membranes moist  Neck: supple, no carotid bruits, thyroid not palpable  Lungs: Bilateral equal air entry, clear to auscultation, no wheezing, rales or rhonchi, normal effort  Cardiovascular: normal rate, regular rhythm, no murmur, + AICD left upper part of chest  Abdomen: Soft, nontender, nondistended, normal bowel sounds, no hepatomegaly or splenomegaly  Neurologic: There are no new focal motor or sensory deficits, normal muscle tone and bulk, no abnormal sensation, normal speech, cranial nerves II through XII grossly intact  Skin: No gross lesions, rashes, bruising or bleeding on exposed skin area  Extremities: + Dialysis fistula right arm, peripheral pulses palpable, no pedal edema or calf pain with palpation  Psych: normal affect     Investigations:      Laboratory Testing:  Recent Results (from the past 24 hour(s))   CBC Auto Differential    Collection Time: 10/17/20  5:36 AM   Result Value Ref Range    WBC 9.0 3.5 - 11.3 k/uL    RBC 3.23 (L) 4.21 - 5.77 m/uL    Hemoglobin 9.7 (L) 13.0 - 17.0 g/dL    Hematocrit 30.2 (L) 40.7 - 50.3 %    MCV 93.5 82.6 - 102.9 fL    MCH 30.0 25.2 - 33.5 pg    MCHC 32.1 28.4 - 34.8 g/dL RDW 14.1 11.8 - 14.4 %    Platelets 212 815 - 639 k/uL    MPV 11.4 8.1 - 13.5 fL    NRBC Automated 0.0 0.0 per 100 WBC    Differential Type NOT REPORTED     WBC Morphology NOT REPORTED     RBC Morphology NOT REPORTED     Platelet Estimate NOT REPORTED     Seg Neutrophils 72 (H) 36 - 66 %    Lymphocytes 9 (L) 24 - 44 %    Monocytes 18 (H) 1 - 7 %    Eosinophils % 0 (L) 1 - 4 %    Basophils 0 %    Immature Granulocytes 1 (H) 0 %    Segs Absolute 6.48 1.8 - 7.7 k/uL    Absolute Lymph # 0.81 (L) 1.0 - 4.8 k/uL    Absolute Mono # 1.62 (H) 0.2 - 0.8 k/uL    Absolute Eos # 0.00 0.0 - 0.4 k/uL    Basophils Absolute 0.00 0.0 - 0.2 k/uL    Absolute Immature Granulocyte 0.09 0.00 - 0.30 k/uL   Basic Metabolic Panel w/ Reflex to MG    Collection Time: 10/17/20  5:36 AM   Result Value Ref Range    Glucose 93 70 - 99 mg/dL    BUN 38 (H) 6 - 20 mg/dL    CREATININE 11.64 (HH) 0.70 - 1.20 mg/dL    Bun/Cre Ratio 3 (L) 9 - 20    Calcium 9.4 8.6 - 10.4 mg/dL    Sodium 134 (L) 135 - 144 mmol/L    Potassium 4.8 3.7 - 5.3 mmol/L    Chloride 92 (L) 98 - 107 mmol/L    CO2 27 20 - 31 mmol/L    Anion Gap 15 9 - 17 mmol/L    GFR Non-African American 5 (L) >60 mL/min    GFR African American 5 (L) >60 mL/min    GFR Comment          GFR Staging NOT REPORTED    Hepatic Function Panel    Collection Time: 10/17/20  5:36 AM   Result Value Ref Range    Alb 3.9 3.5 - 5.2 g/dL    Alkaline Phosphatase 76 40 - 129 U/L    ALT 21 5 - 41 U/L    AST 24 <40 U/L    Total Bilirubin 0.51 0.3 - 1.2 mg/dL    Bilirubin, Direct 0.14 <0.31 mg/dL    Bilirubin, Indirect 0.37 0.00 - 1.00 mg/dL    Total Protein 8.5 (H) 6.4 - 8.3 g/dL    Globulin NOT REPORTED 1.5 - 3.8 g/dL    Albumin/Globulin Ratio NOT REPORTED 1.0 - 2.5   Lipase    Collection Time: 10/17/20  5:36 AM   Result Value Ref Range    Lipase 23 13 - 60 U/L   Troponin    Collection Time: 10/17/20  5:36 AM   Result Value Ref Range    Troponin, High Sensitivity 140 (HH) 0 - 22 ng/L    Troponin T NOT REPORTED <0.03 ng/mL    Troponin Interp NOT REPORTED    COVID-19    Collection Time: 10/17/20  5:53 AM    Specimen: Other   Result Value Ref Range    SARS-CoV-2          SARS-CoV-2, Rapid Not Detected Not Detected    Source . NASOPHARYNGEAL SWAB     SARS-CoV-2         EKG 12 Lead    Collection Time: 10/17/20  6:11 AM   Result Value Ref Range    Ventricular Rate 103 BPM    Atrial Rate 103 BPM    P-R Interval 168 ms    QRS Duration 88 ms    Q-T Interval 334 ms    QTc Calculation (Bazett) 437 ms    P Axis 50 degrees    R Axis -2 degrees    T Axis 82 degrees   Lactate, Sepsis    Collection Time: 10/17/20  7:40 AM   Result Value Ref Range    Lactic Acid, Sepsis 1.0 0.5 - 1.9 mmol/L    Lactic Acid, Sepsis, Whole Blood NOT REPORTED 0.5 - 1.9 mmol/L       Imaging/Diagnostics:  Xr Chest Portable    Result Date: 10/17/2020  Interval removal of right-sided double lumen catheter. Chronic elevation the right hemidiaphragm. No acute cardiopulmonary process. Assessment :      Hospital Problems           Last Modified POA    * (Principal) Fever of unknown origin 10/17/2020 Yes    Biventricular CHF (congestive heart failure) ([facility] [address] with icd chronic  10/17/2020 Yes    DM (diabetes mellitus) type II controlled with renal manifestation ([address] 10/17/2020 Yes    ESRD (end stage renal disease) on dialysis ([address] 10/17/2020 Yes    Cardiomyopathy ([facility] [address] 10/17/2020 Yes    Anemia of chronic disease 10/17/2020 Yes          Plan:     Patient status inpatient in the Progressive Unit/Step down    1. Resume home medications including Eliquis  2. Blood cultures have already been drawn  3. Urinalysis if able to void  4. Repeat chest x-ray in the morning  5. Already on doxycycline apparently started 2 days back, will continue it  6. GI prophylaxis  7. Consult nephrology  8. Will need ID consult if no source of fever is found  9.  Monitor daily labs    Consultations:   IP CONSULT TO HOSPITALIST  IP CONSULT TO NEPHROLOGY     Patient is admitted as inpatient

## 2020-10-17 NOTE — ED PROVIDER NOTES
Medical Decision Making: The patient was initially seen by Dr. Trevor Briscoe and signed out to me after discussing the case with her thoroughly. Chest x-ray per radiologist shows no acute findings, coronavirus test is negative, and he does not make urine. He does not have a dialysis catheter but rather has a fistula. Blood cultures are obtained. Case discussed with his nephrologist who recommends admission to the hospital for observation. Treatment diagnosis and disposition were discussed with the patient. CONSULTS:  None    PROCEDURES:  None    FINAL IMPRESSION      1. Fever, unspecified fever cause         DISPOSITION/PLAN   DISPOSITION Decision To Admit 10/17/2020 10:07:50 AM       PATIENT REFERRED TO:   No follow-up provider specified.     DISCHARGE MEDICATIONS:     New Prescriptions    No medications on file         (Please note that portions of this note were completed with a voice recognition program.  Efforts were made to edit the dictations but occasionally words are mis-transcribed.)    Eduardo Sommer MD  Attending Emergency Physician         Eduardo Sommer MD  10/17/20 5836

## 2020-10-18 PROBLEM — A40.9 STREPTOCOCCAL SEPTICEMIA (HCC): Status: ACTIVE | Noted: 2020-10-18

## 2020-10-18 LAB
ANION GAP SERPL CALCULATED.3IONS-SCNC: 14 MMOL/L (ref 9–17)
BUN BLDV-MCNC: 22 MG/DL (ref 6–20)
BUN/CREAT BLD: ABNORMAL (ref 9–20)
CALCIUM SERPL-MCNC: 9.2 MG/DL (ref 8.6–10.4)
CHLORIDE BLD-SCNC: 91 MMOL/L (ref 98–107)
CO2: 27 MMOL/L (ref 20–31)
CREAT SERPL-MCNC: 7.82 MG/DL (ref 0.7–1.2)
EKG ATRIAL RATE: 103 BPM
EKG P AXIS: 50 DEGREES
EKG P-R INTERVAL: 168 MS
EKG Q-T INTERVAL: 334 MS
EKG QRS DURATION: 88 MS
EKG QTC CALCULATION (BAZETT): 437 MS
EKG R AXIS: -2 DEGREES
EKG T AXIS: 82 DEGREES
EKG VENTRICULAR RATE: 103 BPM
GFR AFRICAN AMERICAN: 9 ML/MIN
GFR NON-AFRICAN AMERICAN: 7 ML/MIN
GFR SERPL CREATININE-BSD FRML MDRD: ABNORMAL ML/MIN/{1.73_M2}
GFR SERPL CREATININE-BSD FRML MDRD: ABNORMAL ML/MIN/{1.73_M2}
GLUCOSE BLD-MCNC: 78 MG/DL (ref 70–99)
HCT VFR BLD CALC: 28.6 % (ref 40.7–50.3)
HEMOGLOBIN: 8.9 G/DL (ref 13–17)
INR BLD: 1.1
MCH RBC QN AUTO: 30.1 PG (ref 25.2–33.5)
MCHC RBC AUTO-ENTMCNC: 31.1 G/DL (ref 28.4–34.8)
MCV RBC AUTO: 96.6 FL (ref 82.6–102.9)
NRBC AUTOMATED: 0 PER 100 WBC
PDW BLD-RTO: 14.3 % (ref 11.8–14.4)
PLATELET # BLD: 152 K/UL (ref 138–453)
PMV BLD AUTO: 11.3 FL (ref 8.1–13.5)
POTASSIUM SERPL-SCNC: 4.4 MMOL/L (ref 3.7–5.3)
PROTHROMBIN TIME: 11.3 SEC (ref 9–12)
RBC # BLD: 2.96 M/UL (ref 4.21–5.77)
SODIUM BLD-SCNC: 132 MMOL/L (ref 135–144)
TROPONIN INTERP: ABNORMAL
TROPONIN T: ABNORMAL NG/ML
TROPONIN, HIGH SENSITIVITY: 167 NG/L (ref 0–22)
WBC # BLD: 5.9 K/UL (ref 3.5–11.3)

## 2020-10-18 PROCEDURE — 97530 THERAPEUTIC ACTIVITIES: CPT

## 2020-10-18 PROCEDURE — 99222 1ST HOSP IP/OBS MODERATE 55: CPT | Performed by: INTERNAL MEDICINE

## 2020-10-18 PROCEDURE — 84484 ASSAY OF TROPONIN QUANT: CPT

## 2020-10-18 PROCEDURE — 85027 COMPLETE CBC AUTOMATED: CPT

## 2020-10-18 PROCEDURE — 36415 COLL VENOUS BLD VENIPUNCTURE: CPT

## 2020-10-18 PROCEDURE — 2060000000 HC ICU INTERMEDIATE R&B

## 2020-10-18 PROCEDURE — 2580000003 HC RX 258: Performed by: FAMILY MEDICINE

## 2020-10-18 PROCEDURE — 80048 BASIC METABOLIC PNL TOTAL CA: CPT

## 2020-10-18 PROCEDURE — 97116 GAIT TRAINING THERAPY: CPT

## 2020-10-18 PROCEDURE — 99223 1ST HOSP IP/OBS HIGH 75: CPT | Performed by: FAMILY MEDICINE

## 2020-10-18 PROCEDURE — 6370000000 HC RX 637 (ALT 250 FOR IP): Performed by: FAMILY MEDICINE

## 2020-10-18 PROCEDURE — 85610 PROTHROMBIN TIME: CPT

## 2020-10-18 PROCEDURE — 93005 ELECTROCARDIOGRAM TRACING: CPT | Performed by: FAMILY MEDICINE

## 2020-10-18 PROCEDURE — 2580000003 HC RX 258: Performed by: NURSE PRACTITIONER

## 2020-10-18 PROCEDURE — 97162 PT EVAL MOD COMPLEX 30 MIN: CPT

## 2020-10-18 PROCEDURE — 6360000002 HC RX W HCPCS: Performed by: NURSE PRACTITIONER

## 2020-10-18 RX ORDER — CEFTRIAXONE 1 G/1
1 INJECTION, POWDER, FOR SOLUTION INTRAMUSCULAR; INTRAVENOUS EVERY 24 HOURS
Status: DISCONTINUED | OUTPATIENT
Start: 2020-10-18 | End: 2020-10-18

## 2020-10-18 RX ORDER — 0.9 % SODIUM CHLORIDE 0.9 %
250 INTRAVENOUS SOLUTION INTRAVENOUS ONCE
Status: COMPLETED | OUTPATIENT
Start: 2020-10-18 | End: 2020-10-18

## 2020-10-18 RX ORDER — MIDODRINE HYDROCHLORIDE 5 MG/1
10 TABLET ORAL
Status: DISCONTINUED | OUTPATIENT
Start: 2020-10-18 | End: 2020-10-21 | Stop reason: HOSPADM

## 2020-10-18 RX ADMIN — LOSARTAN POTASSIUM 25 MG: 25 TABLET, FILM COATED ORAL at 08:33

## 2020-10-18 RX ADMIN — NEPHROCAP 1 MG: 1 CAP ORAL at 08:33

## 2020-10-18 RX ADMIN — SODIUM CHLORIDE 250 ML: 9 INJECTION, SOLUTION INTRAVENOUS at 18:41

## 2020-10-18 RX ADMIN — SODIUM CHLORIDE, PRESERVATIVE FREE 10 ML: 5 INJECTION INTRAVENOUS at 08:42

## 2020-10-18 RX ADMIN — ACETAMINOPHEN 650 MG: 325 TABLET ORAL at 15:12

## 2020-10-18 RX ADMIN — ASPIRIN 81 MG: 81 TABLET, CHEWABLE ORAL at 08:33

## 2020-10-18 RX ADMIN — APIXABAN 5 MG: 5 TABLET, FILM COATED ORAL at 20:37

## 2020-10-18 RX ADMIN — METHOCARBAMOL TABLETS 750 MG: 750 TABLET, COATED ORAL at 14:51

## 2020-10-18 RX ADMIN — APIXABAN 5 MG: 5 TABLET, FILM COATED ORAL at 08:33

## 2020-10-18 RX ADMIN — METHOCARBAMOL TABLETS 750 MG: 750 TABLET, COATED ORAL at 08:33

## 2020-10-18 RX ADMIN — CEFTRIAXONE SODIUM 1 G: 1 INJECTION, POWDER, FOR SOLUTION INTRAMUSCULAR; INTRAVENOUS at 06:37

## 2020-10-18 RX ADMIN — MIDODRINE HYDROCHLORIDE 10 MG: 5 TABLET ORAL at 16:03

## 2020-10-18 RX ADMIN — METHOCARBAMOL TABLETS 750 MG: 750 TABLET, COATED ORAL at 20:37

## 2020-10-18 RX ADMIN — METOPROLOL SUCCINATE 25 MG: 25 TABLET, FILM COATED, EXTENDED RELEASE ORAL at 08:33

## 2020-10-18 ASSESSMENT — ENCOUNTER SYMPTOMS
BLOOD IN STOOL: 0
STRIDOR: 0
COUGH: 0
CONSTIPATION: 0
NAUSEA: 1
WHEEZING: 0
DIARRHEA: 1
ABDOMINAL PAIN: 0
CHEST TIGHTNESS: 0
SHORTNESS OF BREATH: 0
VOMITING: 1

## 2020-10-18 ASSESSMENT — PAIN SCALES - GENERAL: PAINLEVEL_OUTOF10: 0

## 2020-10-18 NOTE — CONSULTS
Nephrology ESRD Consult Note    Reason for Consult:  Fluid and hypertension management for pt with ESRD     Requesting Physician:  Dr Maddie Christensen    History Obtained From:  patient, electronic medical record    HD Unit:       [facility]    Dry Weight:       122.5 kg  Chief Complaint:  Fever    History of Present Illness: This is a 63 y.o. male with PMH of CMP s/p ICD, DM, HTN, and end stage renal disease on hemodialysis [facility] at [facility] [employer] under Dr Elvi Richardson, who presented to [facility] ER for nausea, vomiting and diarrhea with generalized malaise. He had gone to dialysis yesterday, but they sent him to ER due to fever of 102. COVID was negative. Blood Cultures drawn and are positive for gram positive cocci in chains. He was transferred to [facility] due to bed availability. Prior to transfer, he did have dialysis and 2300 removed. Per patient he started having symptoms on Thursday after dialysis. No one sick at home. He has been on dialysis for 5 years. He does not urinate. His outpatient history and dialysis orders, usual dry wt  and out pt dialysis run sheets were reviewed.      Past Medical History:        Diagnosis Date    A-V fistula ([address]     lt arm    AICD (automatic cardioverter/defibrillator) present 2006    Asthma     Atrial fibrillation ([address]     Blood transfusion reaction     CAD (coronary artery disease)     dr Shahana Lopez cardiologist recently aicd check /clearance 4/15    CHF (congestive heart failure) ([address]     CHF (congestive heart failure) ([address]     COPD (chronic obstructive pulmonary disease) ([address]     CRF (chronic renal failure)     Diabetes mellitus ([address]     IDDM    Dialysis patient ([address]     GERD (gastroesophageal reflux disease)     Hemodialysis patient ([address]     laskey tues-thurs-sat    Hypertension     treated w/ meds    Obesity        Access:  Right AVF + thrill and bruit    Past Surgical History:        Procedure Laterality Date  CARDIAC DEFIBRILLATOR PLACEMENT  2006    PACEMAKER PLACEMENT      VASCULAR SURGERY      L avf       Outpatient Medications:     Medications Prior to Admission: doxycycline hyclate (VIBRAMYCIN) 100 MG capsule, Take 100 mg by mouth 2 times daily  methocarbamol (ROBAXIN) 750 MG tablet, Take 750 mg by mouth 3 times daily  ELIQUIS 5 MG TABS tablet, TAKE ONE TABLET BY MOUTH TWICE DAILY  losartan (COZAAR) 25 MG tablet, TAKE ONE TABLET BY MOUTH EVERY DAY  aspirin 81 MG chewable tablet, Take 81 mg by mouth daily  midodrine (PROAMATINE) 5 MG tablet, Take 10 mg by mouth as needed  HYDROcodone-acetaminophen (NORCO) 5-325 MG per tablet, Take 1 tablet by mouth every 6 hours as needed for Pain.   methylPREDNISolone (MEDROL) 4 MG tablet, Take 2 mg by mouth daily  lanthanum (FOSRENOL) 1000 MG chewable tablet, CHEW AND SWALLOW 1 TABLET THREE TIMES A DAY WITH MEALS  metoprolol succinate (TOPROL XL) 25 MG extended release tablet, TAKE ONE TABLET BY MOUTH EVERY DAY  oxyCODONE-acetaminophen (PERCOCET) 5-325 MG per tablet, TAKE ONE TABLET BY MOUTH EVERY 4 HOURS AS NEEDED  B Complex-C-Folic Acid (LOLA-GRACE) TABS, TAKE 1 TABLET BY MOUTH EVERY EVENING    Current Medications:    cefTRIAXone (ROCEPHIN) 1 g IVPB in 50 mL D5W minibag, Q24H  aspirin chewable tablet 81 mg, Daily  b complex-C-folic acid (NEPHROCAPS) capsule 1 mg, Daily  apixaban (ELIQUIS) tablet 5 mg, BID  losartan (COZAAR) tablet 25 mg, Daily  methocarbamol (ROBAXIN) tablet 750 mg, TID  metoprolol succinate (TOPROL XL) extended release tablet 25 mg, Daily  midodrine (PROAMATINE) tablet 10 mg, PRN  sodium chloride flush 0.9 % injection 10 mL, 2 times per day  sodium chloride flush 0.9 % injection 10 mL, PRN  potassium chloride (KLOR-CON M) extended release tablet 40 mEq, PRN    Or  potassium bicarb-citric acid (EFFER-K) effervescent tablet 40 mEq, PRN    Or  potassium chloride 10 mEq/100 mL IVPB (Peripheral Line), PRN  magnesium sulfate 1 g in dextrose 5% 100 mL IVPB, PRN  acetaminophen (TYLENOL) tablet 650 mg, Q6H PRN    Or  acetaminophen (TYLENOL) suppository 650 mg, Q6H PRN  polyethylene glycol (GLYCOLAX) packet 17 g, Daily PRN  promethazine (PHENERGAN) tablet 12.5 mg, Q6H PRN    Or  ondansetron (ZOFRAN) injection 4 mg, Q6H PRN  nicotine (NICODERM CQ) 21 MG/24HR 1 patch, Daily PRN        Allergies:  Spironolactone    Social History:    Social History     Socioeconomic History    Marital status: Legally      Spouse name: Not on file    Number of children: Not on file    Years of education: Not on file    Highest education level: Not on file   Occupational History    Not on file   Social Needs    Financial resource strain: Not on file    Food insecurity     Worry: Not on file     Inability: Not on file    Transportation needs     Medical: Not on file     Non-medical: Not on file   Tobacco Use    Smoking status: Never Smoker    Smokeless tobacco: Never Used   Substance and Sexual Activity    Alcohol use: No    Drug use: No    Sexual activity: Not on file   Lifestyle    Physical activity     Days per week: Not on file     Minutes per session: Not on file    Stress: Not on file   Relationships    Social connections     Talks on phone: Not on file     Gets together: Not on file     Attends [employer] service: Not on file     Active member of club or organization: Not on file     Attends meetings of clubs or organizations: Not on file     Relationship status: Not on file    Intimate partner violence     Fear of current or ex partner: Not on file     Emotionally abused: Not on file     Physically abused: Not on file     Forced sexual activity: Not on file   Other Topics Concern    Not on file   Social History Narrative    Not on file       Family History:   Family History   Adopted: Yes       Review of Systems:    Constitutional: ++ fever, ++chills, ++ lethargy, ++ weakness. HEENT:  No headache, otalgia, itchy eyes, nasal discharge or sore throat.   Cardiac: No chest pain, dyspnea, orthopnea or PND. Chest:              No cough, phlegm or wheezing. Abdomen:  No abdominal pain, nausea or vomiting. Neuro:  No focal weakness, abnormal movements orseizure like activity. Skin:   No rashes, no itching. :   No hematuria, no pyuria, no dysuria, no flank pain. Extremities:  No swelling or joint pains. ROS was otherwise negative except as mentioned in the [address] Objective:  Constitutional:    CURRENT TEMPERATURE:  Temp: 99.8 °F (37.7 °C)  MAXIMUM TEMPERATURE OVER 24HRS:  Temp (24hrs), Av.1 °F (37.3 °C), Min:97.2 °F (36.2 °C), Max:101.6 °F (38.7 °C)    CURRENT RESPIRATORY RATE:  Resp: 15  CURRENT PULSE:  Pulse: 87  CURRENT BLOOD PRESSURE:  BP: 100/62  24HR BLOOD PRESSURE RANGE:  Systolic (83TBF), GJI:029 , Min:89 , EWI:553   ; Diastolic (81RKO), EEF:67, Min:39, Max:85    24HR INTAKE/OUTPUT:      Intake/Output Summary (Last 24 hours) at 10/18/2020 1013  Last data filed at 10/18/2020 0525  Gross per 24 hour   Intake 180 ml   Output --   Net 180 ml         Physical Exam:  AAO x 3,          speaking in full sentences, no accessory muscle use. HEENT: Atraumatic, normocephalic, no throat congestion, moist mucosa. Eyes:   Pupils equal, round and reactive to light, EOMI. Neck:   No JVD, no thyromegaly, no lymphadenopathy. Chest:              Bilateral vesicular breath sounds, no rales or wheezes. Cardiac:  S1 S2 RR, no murmurs, gallops or rubs . Abdomen: Soft, non-tender, no masses or organomegaly, BS audible. :   No suprapubic or flank tenderness. Neuro:  AAO x 3, No FND. SKIN:  No rashes, good skin turgor.   Extremities:  No edema, palpable peripheral pulses, no calf tenderness  + Right lower arm AVF    Labs:  PTH: No results found for: IPTH  CBC:   Recent Labs     10/17/20  0536 10/18/20  0652   WBC 9.0 5.9   RBC 3.23* 2.96*   HGB 9.7* 8.9*   HCT 30.2* 28.6*   MCV 93.5 96.6   MCH 30.0 30.1   MCHC 32.1 31.1   RDW 14.1 14.3    152   MPV 11.4 11.3

## 2020-10-18 NOTE — H&P
day) also noted fatigue decreased energy and some chills. Work-up in the ER was unremarkable for source of infection, he got dialyzed and given lack of beds he was transferred for further management here. Upon my evaluation patient She denies any chest pain, sob or headache. He does report that his nausea and vomiting are better overnight and denies any abdominal pain or other pain or other inflammation or redness in his skin. Patient has a very mild right foot dorsum swelling where he dropped something on it couple of days ago there is no limitation of movement or tenderness on palpation  He denies any recent URI symptoms or change in his daily habits or medications. Blood cultures were drawn yesterday and sent and came back positive for streptococcal agalactiae 2/2  Patient was initiated on Rocephin after culture results    Covid was checked yesterday and came back negative    Past Medical History:     Past Medical History:   Diagnosis Date    [facility]     lt arm    AICD (automatic cardioverter/defibrillator) present 2006    Asthma     Atrial fibrillation ([facility]     Blood transfusion reaction     CAD (coronary artery disease)     dr Shahana Lopez cardiologist recently aicd check /clearance 4/15    CHF (congestive heart failure) ([address]     CHF ([facility]estive heart failure) ([facility]     COPD (chronic obstructive pulmonary disease) ([address]     CRF (chronic renal failure)     Diabetes mellitus ([facility]     IDDM    Dialysis patient ([facility]     GERD (gastroesophageal reflux disease)     Hemodialysis patient ([facility]     laskey tues-thurs-sat    Hypertension     treated w/ meds    Obesity         Past Surgical History:     Past Surgical History:   Procedure Laterality Date    CARDIAC DEFIBRILLATOR PLACEMENT  2006    PACEMAKER PLACEMENT      VASCULAR SURGERY      L avf        Medications Prior to Admission:     Prior to Admission medications    Medication Sig Start Date End Date Taking?  Authorizing Provider disturbance. Respiratory: Negative for cough, chest tightness, shortness of breath, wheezing and stridor. Cardiovascular: Negative for chest pain, palpitations and leg swelling. Gastrointestinal: Positive for diarrhea, nausea and vomiting. Negative for abdominal pain, blood in stool and constipation. Genitourinary: Negative for difficulty urinating, dysuria and frequency. Musculoskeletal: Negative for myalgias. Skin: Negative for rash. Neurological: Negative for dizziness, seizures, weakness, light-headedness, numbness and headaches. Psychiatric/Behavioral: The patient is not nervous/anxious. All other systems reviewed and are negative. Physical Exam:   /77   Pulse 111   Temp 102.9 °F (39.4 °C)   Resp 19   Ht 6' 1\" (1.854 m)   Wt 267 lb 13.7 oz (121.5 kg)   SpO2 98%   BMI 35.34 kg/m²   Temp (24hrs), Av.8 °F (37.7 °C), Min:97.5 °F (36.4 °C), Max:102.9 °F (39.4 °C)    No results for input(s): POCGLU in the last 72 hours. Intake/Output Summary (Last 24 hours) at 10/18/2020 1428  Last data filed at 10/18/2020 1300  Gross per 24 hour   Intake 660 ml   Output --   Net 660 ml       Physical Exam  Vitals signs and nursing note reviewed. Constitutional:       General: He is not in acute distress. Appearance: He is well-developed. He is not diaphoretic. HENT:      Head: Normocephalic and atraumatic. Right Ear: Hearing normal.      Left Ear: Hearing normal.      Nose: Nose normal. No rhinorrhea. Eyes:      General: Lids are normal.      Extraocular Movements:      Right eye: Normal extraocular motion. Left eye: Normal extraocular motion. Conjunctiva/sclera: Conjunctivae normal.      Right eye: Right conjunctiva is not injected. Left eye: Left conjunctiva is not injected. Pupils: Pupils are equal, round, and reactive to light. Pupils are equal.      Right eye: Pupil is reactive. Left eye: Pupil is reactive.    Neck:      Musculoskeletal: Neck supple. Thyroid: No thyromegaly. Vascular: No carotid bruit. Trachea: Trachea and phonation normal. No tracheal deviation. Cardiovascular:      Rate and Rhythm: Normal rate and regular rhythm. Pulses: Normal pulses. Heart sounds: Normal heart sounds. No murmur. Pulmonary:      Effort: Pulmonary effort is normal. No respiratory distress. Breath sounds: Normal breath sounds. Decreased air movement present. No stridor. No decreased breath sounds. Abdominal:      General: Bowel sounds are normal. There is no distension. Palpations: Abdomen is soft. There is no mass. Tenderness: There is no abdominal tenderness. There is no guarding. Musculoskeletal:         General: No tenderness. Comments: AV fistula noted in right upper extremity, with good thrill and bruit   Skin:     General: Skin is warm and dry. Findings: No erythema, lesion or rash. Neurological:      Mental Status: He is alert and oriented to person, place, and time. He is not disoriented. Cranial Nerves: No cranial nerve deficit. Psychiatric:         Speech: Speech normal.         Behavior: Behavior normal. Behavior is cooperative.          Investigations:      Laboratory Testing:  Recent Results (from the past 24 hour(s))   Troponin    Collection Time: 10/17/20  7:15 PM   Result Value Ref Range    Troponin, High Sensitivity 186 (HH) 0 - 22 ng/L    Troponin T NOT REPORTED <0.03 ng/mL    Troponin Interp NOT REPORTED    Basic Metabolic Panel w/ Reflex to MG    Collection Time: 10/18/20  6:52 AM   Result Value Ref Range    Glucose 78 70 - 99 mg/dL    BUN 22 (H) 6 - 20 mg/dL    CREATININE 7.82 (HH) 0.70 - 1.20 mg/dL    Bun/Cre Ratio NOT REPORTED 9 - 20    Calcium 9.2 8.6 - 10.4 mg/dL    Sodium 132 (L) 135 - 144 mmol/L    Potassium 4.4 3.7 - 5.3 mmol/L    Chloride 91 (L) 98 - 107 mmol/L    CO2 27 20 - 31 mmol/L    Anion Gap 14 9 - 17 mmol/L    GFR Non-African American 7 (L) >60 mL/min    GFR  American 9 (L) >60 mL/min    GFR Comment          GFR Staging NOT REPORTED    CBC    Collection Time: 10/18/20  6:52 AM   Result Value Ref Range    WBC 5.9 3.5 - 11.3 k/uL    RBC 2.96 (L) 4.21 - 5.77 m/uL    Hemoglobin 8.9 (L) 13.0 - 17.0 g/dL    Hematocrit 28.6 (L) 40.7 - 50.3 %    MCV 96.6 82.6 - 102.9 fL    MCH 30.1 25.2 - 33.5 pg    MCHC 31.1 28.4 - 34.8 g/dL    RDW 14.3 11.8 - 14.4 %    Platelets 359 945 - 229 k/uL    MPV 11.3 8.1 - 13.5 fL    NRBC Automated 0.0 0.0 per 100 WBC   Protime-INR    Collection Time: 10/18/20  6:52 AM   Result Value Ref Range    Protime 11.3 9.0 - 12.0 sec    INR 1.1    Troponin    Collection Time: 10/18/20  6:52 AM   Result Value Ref Range    Troponin, High Sensitivity 167 (HH) 0 - 22 ng/L    Troponin T NOT REPORTED <0.03 ng/mL    Troponin Interp NOT REPORTED    EKG 12 Lead    Collection Time: 10/18/20  9:42 AM   Result Value Ref Range    Ventricular Rate 85 BPM    Atrial Rate 85 BPM    P-R Interval 168 ms    QRS Duration 98 ms    Q-T Interval 380 ms    QTc Calculation (Bazett) 452 ms    P Axis 73 degrees    R Axis 31 degrees    T Axis 79 degrees       Imaging/Diagnostics:  Xr Chest Portable    Result Date: 10/17/2020  Interval removal of right-sided double lumen catheter. Chronic elevation the right hemidiaphragm. No acute cardiopulmonary process. Assessment :      Hospital Problems           Last Modified POA    * (Principal) Streptococcal septicemia ([facility] 10/18/2020 Yes    Fever of unknown origin 10/18/2020 Yes    Biventricular CHF (congestive heart failure) ([facility] with icd chronic  10/18/2020 Yes    A-fib ([facility] 10/18/2020 Yes    Overview Signed 2/3/2014  3:55 PM by Balwinder Mckinney DO     Chads 2 score. On ASA.  Pt has refused coumadin therapy in the past.          ESRD (end stage renal disease) on dialysis ([address] 10/18/2020 Yes    Anemia of chronic disease 10/18/2020 Yes    CAD (coronary artery disease) 10/18/2020 Yes    Overview Signed 10/18/2020  8:28 AM by Merrily Decree MD dr Syl Cabral [facility] cardiologist recently aicd check /clearance 4/15               Plan:     Patient status inpatient in the Progressive Unit/Step down    Streptococcal septicemia: Positive blood cultures 2/2   continue antibiotics  Echocardiogram, start with transthoracic  Consult ID  Source is not clear yet, cruciate ID recommendations    End-stage renal disease on hemodialysis wounds: Consult nephrology for management    Anemia of chronic disease: IV Aranesp with dialysis per nephrology    Coronary artery disease status post stenting: Continue chronic medications    Chronic A. fib: On Eliquis, continue rate control    Elevated troponin: Likely type II secondary to sepsis, patient denies any chest pain and no acute changes, troponin is trending down after dialysis done yesterday    History of ischemic cardiomyopathy status post ICD placement    HTN: continue home medications    HPL: continue home medications    DM2: Continue diabetes home medications , insulin sliding scale, hypoglycemia protocol    DVT prophylaxis      Consultations:   IP CONSULT TO INFECTIOUS DISEASES  IP CONSULT TO NEPHROLOGY     Patient is admitted as inpatient status because of co-morbidities listed above, severity of signs and symptoms as outlined, requirement for current medical therapies and most importantly because of direct risk to patient if care not provided in a hospital setting. Expected length of stay > 48 hours. Soham Hernandez MD  10/18/2020  2:28 PM    Copy sent to Dr. Brock primary care provider on file.

## 2020-10-18 NOTE — CONSULTS
[facility] - Initial Consult Note  Today's Date and Time: 10/18/2020, 10:31 AM    Impression :   · Streptococcal Group B septicemia , positive 2/2 blood cultures 10/17/2020. Source of septicemia unclear. Patient reports episodes of diarrhea prior to onset of illness. Denies any issues with the skin. He does not make any urine and did not have any  related symptoms. Presumably the septicemia is of bowel origin. · ESRD on Hemodialysis  · Anemia of chronic disease  · CAD S/P stenting  · Chronic A.Fib, rate controlled, on Eliquis  · HTN  · DM type II    Recommendations:   · Increased Ceftriaxone to 2g IV Q 24 hours  · Once patient improves further he could be treated with 3 g of cefazolin post each dialysis. Medical Decision Making/Summary/Discussion:   ·   Infection Control Recommendations   · Ozan Precautions    Antimicrobial Stewardship Recommendations     · Targeted therapy  · PK dosing    Coordination of Outpatient Care:   · Estimated Length of IV antimicrobials: 11/3/2020  · Patient will need Midline Catheter Insertion: no  · Patient will need PICC line Insertion:no  · Patient will need: Home IV , [place],  SNF,  LTAC: yes  · Patient will need outpatient wound care:no    Chief complaint/reason for consultation:   · Streptococcal Group B septicemia       History of Present Illness: Tomasa Odom is a 63y.o.-year-old  male who was initially admitted on 10/17/2020. Patient seen at the request of Dr. Dara Orourke:  Patient is a 62 yo male with PMH of Dilated cardiomyopathy, s/p AICD,  ESRD on HD T-T-Sa was transferred from [facility] due to bed unavailability. On Wednesday, he reports feeling winded when climbing a flight of stairs, had to stop and take rest before feeling okay. The next day  he experienced chills, nausea, decreased appetite.  Continues to have these symptoms, on Saturday, he went for his dialysis, was told he had high grade fever of 102, sent to the [facility]. Transferred today to [facility]. Currently, patient reports SOB, when trying to climb stairs, nausea, decreased appetite since Wednesday. Is currently running a temperature of 102. Denies any chest pain, abdominal pain, vomiting. Reports loose stool in the morning. He does not urinate. Denies any runny nose, sore throat. PMH :Dilated CM, LVEF of 37% on last stress test, s/p AICD, stable; S/P lap cholecystectomy, ESRD on HD, history of PE on eliquis at home. CURRENT EVALUATION: 10/18/20   On examination,  on room air, sitting comfortably without any labored breathing. RR 19, pulse 111, /77 . Does report one episode of loose stools this am.   He also experienced some chills. Labs:    BUN: 38>22  Creatinine: 11.64 > 7.82    WBC: 9.0 > 5.9   Hgb: 9.7 > 8.9   Platelet: 960 > 099    Cultures:  Urine:  ·   Blood:  · 10/17/20: Streptococcus agalactiae (Group B)  Sputum :  ·   Wound:     CSF         Discussed with patient, RN, family. I have personally reviewed the past medical history, past surgical history, medications, social history, and family history, and I have updated the database accordingly.   Past Medical History:     Past Medical History:   Diagnosis Date    A-V fistula ([address]     lt arm    AICD (automatic cardioverter/defibrillator) present 2006    Asthma     Atrial fibrillation ([address]     Blood transfusion reaction     CAD (coronary artery disease)     dr Mary Stokes cardiologist recently aicd check /clearance 4/15    CHF (congestive heart failure) ([address]     CHF (congestive heart failure) ([address]     COPD (chronic obstructive pulmonary disease) ([address]     CRF (chronic renal failure)     Diabetes mellitus ([address]     IDDM    Dialysis patient ([address]     GERD (gastroesophageal reflux disease)     Hemodialysis patient ([address]     laskey tues-thurs-sat    Hypertension     treated w/ meds    Obesity        Past Surgical  History:     Past Surgical History:   Procedure Laterality Date    CARDIAC DEFIBRILLATOR PLACEMENT  2006    PACEMAKER PLACEMENT      VASCULAR SURGERY      L avf       Medications:      cefTRIAXone (ROCEPHIN) IV  1 g Intravenous Q24H    aspirin  81 mg Oral Daily    b complex-C-folic acid  1 mg Oral Daily    apixaban  5 mg Oral BID    losartan  25 mg Oral Daily    methocarbamol  750 mg Oral TID    metoprolol succinate  25 mg Oral Daily    sodium chloride flush  10 mL Intravenous 2 times per day       Social History:     Social History     Socioeconomic History    Marital status: Legally      Spouse name: Not on file    Number of children: Not on file    Years of education: Not on file    Highest education level: Not on file   Occupational History    Not on file   Social Needs    Financial resource strain: Not on file    Food insecurity     Worry: Not on file     Inability: Not on file    Transportation needs     Medical: Not on file     Non-medical: Not on file   Tobacco Use    Smoking status: Never Smoker    Smokeless tobacco: Never Used   Substance and Sexual Activity    Alcohol use: No    Drug use: No    Sexual activity: Not on file   Lifestyle    Physical activity     Days per week: Not on file     Minutes per session: Not on file    Stress: Not on file   Relationships    Social connections     Talks on phone: Not on file     Gets together: Not on file     Attends Anabaptism service: Not on file     Active member of club or organization: Not on file     Attends meetings of clubs or organizations: Not on file     Relationship status: Not on file    Intimate partner violence     Fear of current or ex partner: Not on file     Emotionally abused: Not on file     Physically abused: Not on file     Forced sexual activity: Not on file   Other Topics Concern    Not on file   Social History Narrative    Not on file       Family History:     Family History   Adopted:  Yes        Allergies: Spironolactone     Review of Systems:   Constitutional: Fever +, chills + , not in acute distress  Head: No headaches  Eyes: No double vision or blurry vision. No conjunctival inflammation. ENT: No sore throat or runny nose. . No hearing loss, tinnitus or vertigo. Cardiovascular: No chest pain or palpitations. No shortness of breath. No GRIMES  Lung: SOB +, No cough. No sputum production  Abdomen: No vomiting,  or abdominal pain. Rudy Glow No cramps. Genitourinary: Doesn't urinate  Musculoskeletal: No muscle aches or pains. No joint effusions, swelling or deformities  Hematologic: No bleeding or bruising. Neurologic: No headache, weakness, numbness, or tingling. Integument: No rash, no ulcers. Psychiatric: No depression. Endocrine: No polyuria, no polydipsia, no polyphagia. Physical Examination :     Patient Vitals for the past 8 hrs:   BP Temp Temp src Pulse Resp SpO2   10/18/20 0800 -- -- -- 87 -- --   10/18/20 0723 100/62 99.8 °F (37.7 °C) Oral 90 15 96 %   10/18/20 0417 (!) 97/56 97.5 °F (36.4 °C) Oral 87 17 92 %     General Appearance: Awake, alert, and in no apparent distress  Head:  Normocephalic, no trauma  Eyes: Pupils equal, round, reactive to light and accommodation; extraocular movements intact; sclera anicteric; conjunctivae pink. No embolic phenomena. ENT: Oropharynx clear, without erythema, exudate, or thrush. No tenderness of sinuses. Mouth/throat: mucosa pink and moist. No lesions. Dentition in good repair. Neck:Supple, without lymphadenopathy. Thyroid normal, No bruits. Pulmonary/Chest: Clear to auscultation, without wheezes, rales, or rhonchi. No dullness to percussion. No egophony. Cardiovascular: Regular rate and rhythm without murmurs, rubs, or gallops. Abdomen: Soft, non tender. Bowel sounds normal. No organomegaly  All four Extremities: No cyanosis, clubbing, edema, or effusions, no edema. Neurologic: No gross sensory or motor deficits. Skin: Warm and dry with good turgor. No signs of Making-Other: Thank you for allowing us to participate in the care of this patient. Please call with questions. Beth Hoffman MD     ATTESTATION:    I have discussed the case, including pertinent history and exam findings with the residents and students. I have seen and examined the patient and the key elements of the encounter have been performed by me. I was present when the student obtained his information or examined the patient. I have reviewed the laboratory data, other diagnostic studies and discussed them with the residents. I have updated the medical record where necessary. I agree with the assessment, plan and orders as documented by the resident/ student.     Ashley Marx MD.    Pager: [phone] - Office: [phone]

## 2020-10-18 NOTE — CARE COORDINATION
Case Management Initial Discharge Plan  Mark Albrecht             Met with:patient to discuss discharge plans. Information verified: address, contacts, phone number, , insurance Yes    Emergency Contact/Next of Kin name & number: Stuart Mcnulty [phone]. Offered to contact, stated, not necessary. PCP: No primary care provider on file. Date of last visit: Couple weeks ago, doctor on  in [address] [facility] Provider: Mariam Amin Dual    Discharge Planning    Living Arrangements:  [address] [place] [address]:  [facility] Sammi Fleming has 1 stories  7 stairs to climb to get into front door, n/a stairs to climb to reach second floor  Location of bedroom/bathroom in home main    Patient able to perform ADL's:Independent    Current Services (outpatient & in home) none  DME equipment: n/a  DME provider: n/a    Receiving oral anticoagulation therapy? No    If indicated:   Physician managing anticoagulation treatment: n/a  Where does patient obtain lab work for ATC treatment? n/a      Potential Assistance Needed:  N/A    Patient agreeable to home care: No  [place] of choice provided:  n/a    Prior SNF/Rehab Placement and Facility: no  Agreeable to SNF/Rehab: No  [place] of choice provided: n/a     Evaluation: no    Expected Discharge date:  10/18/20    Patient expects to be discharged to:  home  Follow Up Appointment: Best Day/ Time: (Monday, wednesday, friday AM)    Transportation provider: CJ Boo arrangements needed for discharge: No    Readmission Risk              Risk of Unplanned Readmission:        17             Does patient have a readmission risk score greater than 14?: No  If yes, follow-up appointment must be made within 7 days of discharge. Goals of Care:       Discharge Plan: Home independently.            Electronically signed by Jhonatan Paiz RN on 10/18/20 at 1:47 PM EDT

## 2020-10-18 NOTE — PROGRESS NOTES
Physical Therapy    Facility/Department: [address] STEPDOWN  Initial Assessment    NAME: Emilia Ames  : 1960  MRN: [id number]    Date of Service: 10/18/2020    Discharge Recommendations:           Assessment   Body structures, Functions, Activity limitations: Decreased functional mobility ; Decreased strength;Decreased balance;Decreased endurance  Specific instructions for Next Treatment: therex, balance gait  PT Education: Goals;PT Role;Plan of Care;Orientation;Precautions;Transfer Training;Gait Training;Functional Mobility Training  Activity Tolerance  Activity Tolerance: Patient Tolerated treatment well       Patient Diagnosis(es): There were no encounter diagnoses. has a past medical history of A-V fistula ([facility] AICD (automatic cardioverter/defibrillator) present, Asthma, Atrial fibrillation ([facility] Blood transfusion reaction, CAD (coronary artery disease), CHF (congestive heart failure) ([address] CHF (congestive heart failure) ([facility] [address] COPD (chronic obstructive pulmonary disease) ([facility] [address] [facility] CRF (chronic renal failure), Diabetes mellitus ([facility] [address] [facility] Dialysis patient ([address] GERD (gastroesophageal reflux disease), Hemodialysis patient ([facility] Hypertension, and Obesity. has a past surgical history that includes vascular surgery; Cardiac defibrillator placement (); and pacemaker placement.     Restrictions  Restrictions/Precautions  Restrictions/Precautions: Up as Tolerated  Required Braces or Orthoses?: No  Vision/Hearing  Vision: Impaired  Vision Exceptions: Wears glasses for reading  Hearing: Within functional limits     Subjective  General  Chart Reviewed: Yes  Patient assessed for rehabilitation services?: Yes  Response To Previous Treatment: Not applicable  Family / Caregiver Present: No  Diagnosis: fever of unknown origin  Follows Commands: Within Functional Limits  Subjective  Subjective: pt states his R LE is weak and his foot drags some due to lumbar disc herniation  Pain Screening  Patient Currently in Pain: No  Pain Assessment  Pain Assessment: 0-10  Pain Level: 0  Vital Signs  Patient Currently in Pain: No       Orientation  Orientation  Overall Orientation Status: Within Normal Limits  Social/Functional History  Social/Functional History  Lives With: Daughter, Other (comment)(grandkids)  Type of Home: House  Home Layout: One level, Laundry in basement  Home Access: Stairs to enter without rails  Entrance Stairs - Number of Steps: 7  Bathroom Shower/Tub: Tub/Shower unit  Bathroom Toilet: Standard  Bathroom Accessibility: Accessible  Home Equipment: Crutches, [employer]  ADL Assistance: Independent  Homemaking Assistance: Independent  Homemaking Responsibilities: Yes  Ambulation Assistance: Independent  Transfer Assistance: Independent  Active : No  Patient's  Info: medical vehicle drives him to his appts.  Pt goes to [employer] story after dialysis with that   Occupation: On disability  Cognition   Cognition  Overall Cognitive Status: WFL    Objective          AROM RLE (degrees)  RLE AROM: WFL  AROM LLE (degrees)  LLE AROM : WFL  AROM RUE (degrees)  RUE AROM : WFL  AROM LUE (degrees)  LUE AROM : WFL  Strength RLE  Strength RLE: Exception  R Hip Flexion: 3+/5  R Ankle Dorsiflexion: 3-/5  Strength LLE  Strength LLE: WFL  Strength RUE  Strength RUE: WFL  Strength LUE  Strength LUE: WFL  Tone RLE  RLE Tone: Normotonic  Tone LLE  LLE Tone: Normotonic  Motor Control  Gross Motor?: WNL  Sensation  Overall Sensation Status: WFL  Bed mobility  Rolling to Left: Independent  Rolling to Right: Independent  Supine to Sit: Independent  Sit to Supine: Independent  Scooting: Independent  Transfers  Sit to Stand: Contact guard assistance  Stand to sit: Contact guard assistance  Bed to Chair: Contact guard assistance  Lateral Transfers: Contact guard assistance  Ambulation  Ambulation?: Yes  WB Status: FWB  Ambulation 1  Surface: level tile  Device: Rolling Walker  Other Apparatus: (pt does have mild drop foot but, is able to compensate for drop foot safely)  Assistance: Contact guard assistance  Gait Deviations: Slow Karyn; Increased BRANT  Distance: 500 ft  Stairs/Curb  Stairs?: Yes  Stairs  # Steps : 5  Stairs Height: 6\"  Rails: Right ascending  Device: No Device  Assistance: Contact guard assistance  Gait Deviations  Gait Deviations: Slow Karyn; Increased BRANT     Balance  Sitting - Static: Good  Sitting - Dynamic: Good  Standing - Static: Fair;+  Standing - Dynamic: Fair;+  Exercises  Comments: sit to stand and stand to sit transfers     Plan   Plan  Times per week: 4-5x per week  Plan weeks: 10 visits  Specific instructions for Next Treatment: therex, balance gait  Current Treatment Recommendations: Strengthening, Transfer Training, Endurance Training, Neuromuscular Re-education, Balance Training, Gait Training, Stair training, Functional Mobility Training  Safety Devices  Type of devices:  All fall risk precautions in place, Call light within reach, Left in chair, Nurse notified  Restraints  Initially in place: No    G-Code       OutComes Score                                                  AM-PAC Score  AM-PAC Inpatient Mobility Raw Score : 21 (10/18/20 1307)  AM-PAC Inpatient T-Scale Score : 50.25 (10/18/20 1307)  Mobility Inpatient CMS 0-100% Score: 28.97 (10/18/20 1307)  Mobility Inpatient CMS G-Code Modifier : Gearaiden Baxter (10/18/20 1307)          Goals  Short term goals  Time Frame for Short term goals: 10 visits  Short term goal 1: indep with transfers  Short term goal 2: amb without  ft with SBA  Short term goal 3: negotiate 7 steps SBA with one HR  Short term goal 4: improve balance in standing to good-  Short term goal 5: increase LE strength 1/2 grade  Patient Goals   Patient goals : to get stronger       Therapy Time   Individual Concurrent Group Co-treatment   Time In [facility]         Time Out 1220         Minutes 106 Kisha Malone, PT

## 2020-10-19 LAB
CULTURE: ABNORMAL
EKG ATRIAL RATE: 85 BPM
EKG P AXIS: 73 DEGREES
EKG P-R INTERVAL: 168 MS
EKG Q-T INTERVAL: 380 MS
EKG QRS DURATION: 98 MS
EKG QTC CALCULATION (BAZETT): 452 MS
EKG R AXIS: 31 DEGREES
EKG T AXIS: 79 DEGREES
EKG VENTRICULAR RATE: 85 BPM
LV EF: 35 %
LVEF MODALITY: NORMAL
Lab: ABNORMAL
Lab: ABNORMAL
SPECIMEN DESCRIPTION: ABNORMAL
SPECIMEN DESCRIPTION: ABNORMAL

## 2020-10-19 PROCEDURE — 2580000003 HC RX 258: Performed by: FAMILY MEDICINE

## 2020-10-19 PROCEDURE — 99232 SBSQ HOSP IP/OBS MODERATE 35: CPT | Performed by: INTERNAL MEDICINE

## 2020-10-19 PROCEDURE — 93306 TTE W/DOPPLER COMPLETE: CPT

## 2020-10-19 PROCEDURE — 93010 ELECTROCARDIOGRAM REPORT: CPT | Performed by: INTERNAL MEDICINE

## 2020-10-19 PROCEDURE — 6370000000 HC RX 637 (ALT 250 FOR IP): Performed by: FAMILY MEDICINE

## 2020-10-19 PROCEDURE — 2580000003 HC RX 258: Performed by: INTERNAL MEDICINE

## 2020-10-19 PROCEDURE — 6360000002 HC RX W HCPCS: Performed by: INTERNAL MEDICINE

## 2020-10-19 PROCEDURE — 2060000000 HC ICU INTERMEDIATE R&B

## 2020-10-19 PROCEDURE — 99232 SBSQ HOSP IP/OBS MODERATE 35: CPT | Performed by: FAMILY MEDICINE

## 2020-10-19 RX ORDER — CINACALCET 30 MG/1
60 TABLET, FILM COATED ORAL
Status: DISCONTINUED | OUTPATIENT
Start: 2020-10-20 | End: 2020-10-21 | Stop reason: HOSPADM

## 2020-10-19 RX ADMIN — SODIUM CHLORIDE, PRESERVATIVE FREE 10 ML: 5 INJECTION INTRAVENOUS at 08:18

## 2020-10-19 RX ADMIN — NEPHROCAP 1 MG: 1 CAP ORAL at 08:19

## 2020-10-19 RX ADMIN — APIXABAN 5 MG: 5 TABLET, FILM COATED ORAL at 08:19

## 2020-10-19 RX ADMIN — ASPIRIN 81 MG: 81 TABLET, CHEWABLE ORAL at 08:19

## 2020-10-19 RX ADMIN — SODIUM CHLORIDE, PRESERVATIVE FREE 10 ML: 5 INJECTION INTRAVENOUS at 21:45

## 2020-10-19 RX ADMIN — METHOCARBAMOL TABLETS 750 MG: 750 TABLET, COATED ORAL at 13:54

## 2020-10-19 RX ADMIN — MIDODRINE HYDROCHLORIDE 10 MG: 5 TABLET ORAL at 12:20

## 2020-10-19 RX ADMIN — CEFTRIAXONE SODIUM 2 G: 2 INJECTION, POWDER, FOR SOLUTION INTRAMUSCULAR; INTRAVENOUS at 05:51

## 2020-10-19 RX ADMIN — MIDODRINE HYDROCHLORIDE 10 MG: 5 TABLET ORAL at 08:19

## 2020-10-19 RX ADMIN — APIXABAN 5 MG: 5 TABLET, FILM COATED ORAL at 21:40

## 2020-10-19 RX ADMIN — METHOCARBAMOL TABLETS 750 MG: 750 TABLET, COATED ORAL at 08:18

## 2020-10-19 RX ADMIN — METHOCARBAMOL TABLETS 750 MG: 750 TABLET, COATED ORAL at 21:40

## 2020-10-19 NOTE — PROGRESS NOTES
PHARMACY NOTE:    The electrolyte replacement protocol for potassium/magnesium has been discontinued per P&T guidelines because the patient has reduced renal function (CrCl < 30 mL/min). The patient's most recent potassium & magnesium levels are:  Recent Labs     10/17/20  0536 10/18/20  0652   K 4.8 4.4     Estimated Creatinine Clearance: 14 mL/min (A) (based on SCr of 7.82 mg/dL [facility] AT [facility])). For patients with decreased renal function (below 30ml/min) needing potassium/magnesium supplementation, please order individual bolus doses with appropriate monitoring. Please contact the inpatient pharmacy with any concerns.   Thank you,  Billy Sullivan, AbundioD

## 2020-10-19 NOTE — PROGRESS NOTES
[facility] -Progress Note    Today's Date and Time: 10/19/2020, 9:04 AM    Impression :   · Streptococcal Group B septicemia , positive 2/2 blood cultures 10/17/2020. Source of septicemia unclear. Patient reports episodes of diarrhea prior to onset of illness. Denies any issues with the skin. He does not make any urine and did not have any  related symptoms. Presumably the septicemia is of bowel origin. · ESRD on Hemodialysis  · Anemia of chronic disease  · CAD S/P stenting  · Chronic A.Fib, rate controlled, on Eliquis  · HTN  · DM type II    Recommendations:   · Continue Ceftriaxone to 2g IV Q 24 hours  · Will switch to 3 g of cefazolin post each dialysis, starting 10-20-20. Stop date 10-30-20. .    Medical Decision Making/Summary/Discussion:   ·   Infection Control Recommendations   · [place] Precautions    Antimicrobial Stewardship Recommendations     · Targeted therapy  · PK dosing    Coordination of Outpatient Care:   · Estimated Length of IV antimicrobials: 11/3/2020  · Patient will need Midline Catheter Insertion: no  · Patient will need PICC line Insertion:no  · Patient will need: Home IV , [place],  SNF,  LTAC: yes  · Patient will need outpatient wound care:no    Chief complaint/reason for consultation:   · Streptococcal Group B septicemia       History of Present Illness: Ole Pisano is a 63y.o.-year-old  male who was initially admitted on 10/17/2020. Patient seen at the request of Dr. Xenia Grady:  Patient is a 64 yo male with PMH of Dilated cardiomyopathy, s/p AICD,  ESRD on HD T-T-Sa was transferred from [facility] due to bed unavailability. On Wednesday, he reports feeling winded when climbing a flight of stairs, had to stop and take rest before feeling okay. The next day  he experienced chills, nausea, decreased appetite.  Continues to have these symptoms, on Saturday, he went for his dialysis, was told he had high grade fever of 102, sent to the [address] [place] [address]. [facility]. Transferred today to [facility]. Currently, patient reports SOB, when trying to climb stairs, nausea, decreased appetite since Wednesday. Is currently running a temperature of 102. Denies any chest pain, abdominal pain, vomiting. Reports loose stool in the morning. He does not urinate. Denies any runny nose, sore throat. PMH :Dilated CM, LVEF of 37% on last stress test, s/p AICD, stable; S/P lap cholecystectomy, ESRD on HD, history of PE on eliquis at home. On examination,  on room air, sitting comfortably without any labored breathing. RR 19, pulse 111, /77 . Does report one episode of loose stools this am.   He also experienced some chills. CURRENT EVALUATION: 10/19/20     Patient examined on bedside. Afebrile (Received tylenol yesterday pm). BP 89/48  Overnight no acute events. Patient denies any fever, chills , nausea. Reports improvement in his appetite. Denies loose stools. Feeling better with current treatment. Labs:    BUN: 38>22  Creatinine: 11.64 > 7.82    WBC: 9.0 > 5.9   Hgb: 9.7 > 8.9   Platelet: 013 > 677    Cultures:  Urine:  ·   Blood:  · 10/17/20: Streptococcus agalactiae (Group B)  Sputum :  ·   Wound:     CSF         Discussed with patient, RN, family. I have personally reviewed the past medical history, past surgical history, medications, social history, and family history, and I have updated the database accordingly.   Past Medical History:     Past Medical History:   Diagnosis Date    A-V fistula ([address]     lt arm    AICD (automatic cardioverter/defibrillator) present 2006    Asthma     Atrial fibrillation ([address]     Blood transfusion reaction     CAD (coronary artery disease)     dr Kaci Blackburn cardiologist recently aicd check /clearance 4/15    CHF (congestive heart failure) ([address]     CHF (congestive heart failure) ([address]     COPD (chronic obstructive pulmonary disease) ([address]     CRF (chronic renal failure)     Diabetes mellitus ([facility]     IDDM    Dialysis patient ([facility]     GERD (gastroesophageal reflux disease)     Hemodialysis patient ([facility]     laskey tues-thurs-sat    Hypertension     treated w/ meds    Obesity        Past Surgical  History:     Past Surgical History:   Procedure Laterality Date    CARDIAC DEFIBRILLATOR PLACEMENT  2006    PACEMAKER PLACEMENT      VASCULAR SURGERY      L avf       Medications:      midodrine  10 mg Oral TID WC    cefTRIAXone (ROCEPHIN) IV  2 g Intravenous Q24H    aspirin  81 mg Oral Daily    b complex-C-folic acid  1 mg Oral Daily    apixaban  5 mg Oral BID    methocarbamol  750 mg Oral TID    sodium chloride flush  10 mL Intravenous 2 times per day       Social History:     Social History     Socioeconomic History    Marital status: Legally      Spouse name: Not on file    Number of children: Not on file    Years of education: Not on file    Highest education level: Not on file   Occupational History    Not on file   Social Needs    Financial resource strain: Not on file    Food insecurity     Worry: Not on file     Inability: Not on file    Transportation needs     Medical: Not on file     Non-medical: Not on file   Tobacco Use    Smoking status: Never Smoker    Smokeless tobacco: Never Used   Substance and Sexual Activity    Alcohol use: No    Drug use: No    Sexual activity: Not on file   Lifestyle    Physical activity     Days per week: Not on file     Minutes per session: Not on file    Stress: Not on file   Relationships    Social connections     Talks on phone: Not on file     Gets together: Not on file     Attends Confucianist service: Not on file     Active member of club or organization: Not on file     Attends meetings of clubs or organizations: Not on file     Relationship status: Not on file    Intimate partner violence     Fear of current or ex partner: Not on file     Emotionally abused: Not on file     Physically abused: Not on file rubs, or gallops. Abdomen: Soft, non tender. Bowel sounds normal. No organomegaly  All four Extremities: No cyanosis, clubbing, edema, or effusions, no edema. Neurologic: No gross sensory or motor deficits. Skin: Warm and dry with good turgor. No signs of peripheral arterial or venous insufficiency. No ulcerations. No open wounds. Medical Decision Making -Laboratory:   I have independently reviewed/ordered the following labs:    CBC with Differential:   Recent Labs     10/17/20  0536 10/18/20  0652   WBC 9.0 5.9   HGB 9.7* 8.9*   HCT 30.2* 28.6*    152   LYMPHOPCT 9*  --    MONOPCT 18*  --      BMP:   Recent Labs     10/17/20  0536 10/18/20  0652   * 132*   K 4.8 4.4   CL 92* 91*   CO2 27 27   BUN 38* 22*   CREATININE 11.64* 7.82*     Hepatic Function Panel:   Recent Labs     10/17/20  0536   PROT 8.5*   LABALBU 3.9   BILIDIR 0.14   IBILI 0.37   BILITOT 0.51   ALKPHOS 76   ALT 21   AST 24     No results for input(s): RPR in the last 72 hours. No results for input(s): HIV in the last 72 hours. No results for input(s): BC in the last 72 hours.   Lab Results   Component Value Date    RBC 2.96 10/18/2020    WBC 5.9 10/18/2020     Lab Results   Component Value Date    CREATININE 7.82 10/18/2020    GLUCOSE 78 10/18/2020       Medical Decision Making-Imaging:     EXAMINATION:    ONE XRAY VIEW OF THE CHEST         10/17/2020 7:42 am         COMPARISON:    21 September 2019         HISTORY:    ORDERING SYSTEM PROVIDED HISTORY: fever    TECHNOLOGIST PROVIDED HISTORY:    fever    Acuity: Acute    Type of Exam: Unknown         FINDINGS:    AP portable view of the chest time stamped at 723 hours demonstrates chronic    elevation right hemidiaphragm.  A unipolar pacemaker enters from the left    with intact lead in appropriate position.  Heart size is stable.  No vascular    congestion, focal consolidation, effusion, or pneumothorax is noted.  Osseous    and mediastinal structures are age-appropriate. Mony Deleon right-sided double    lumen catheter has been removed.              Impression    Interval removal of right-sided double lumen catheter.  Chronic elevation the    right hemidiaphragm.  No acute cardiopulmonary process.             Medical Decision Making-Other: Thank you for allowing us to participate in the care of this patient. Please call with questions. Beth Hoffman MD     ATTESTATION:    I have discussed the case, including pertinent history and exam findings with the residents and students. I have seen and examined the patient and the key elements of the encounter have been performed by me. I was present when the student obtained his information or examined the patient. I have reviewed the laboratory data, other diagnostic studies and discussed them with the residents. I have updated the medical record where necessary. I agree with the assessment, plan and orders as documented by the resident/ student.     Ashley Marx MD.      Pager: [phone] - Office: [phone]

## 2020-10-19 NOTE — FLOWSHEET NOTE
Assessment:  Patient is not interested in Spiritual care services at this time. Intervention:  nurtured hope and informed the patient that Chaplains are available 24/7 if needed. Outcome: Patient was thankful for the information and happy the  respected their wishes. 10/19/20 1510   Encounter Summary   Services provided to: Patient and family together   Referral/Consult From: [address] [place] [address] Family members   Place of Mosque none   Continue Visiting   (10/19/20)   Complexity of Encounter Moderate   Length of Encounter 15 minutes   Spiritual Assessment Completed Yes   Routine   Type Initial   Assessment Passive   Intervention Nurtured hope   Outcome Expressed gratitude; Refused/declined

## 2020-10-19 NOTE — PROGRESS NOTES
[facility]  Office: [address], DO, Donna Martines, DO, Gray Shown, DO, [employer], DO, Norman Santana MD, Lyle Mac MD, Harshad Avalos MD, Lashay Douglas MD, Page Arellano MD, Lyndsey Rivas MD, Moni Hernández MD, Gilda Osamn MD, Prabhjot Givens MD, Niya Cheung, DO, Chloe Jiang MD, Gladis Mendoza MD, Becki Theodore DO, Yaima Granger MD,  Emmanuel Hagan, DO, Tiffany Chanel MD, Diana Rodrigez MD, Marie Sotelo [facility], [facility], CNP, Noris Ontiveros, CNP, Blossom Quevedo, CNS, Florencio Duarte, CNP, Marilee Saavedra, CNP, Bernard Middleton, CNP, Yvette Vizcaino, CNP, Sindhu Lanier, CNP, Chitra Molina PA-C, Jluis Wall, MEÑO, James Perry, CNP, Mariajose Lui, CNP, Law Barnes, CNP, Amy Gutierrez, CNP, Emilee Martinez, [facility]   [address] [facility]    Progress Note    10/19/2020    4:40 PM    Name:   Souleymane Mistry  MRN:     [id number]     Acct:      [de-identified]   Room:   [address] [place] [address] Day:  2  Admit Date:  10/17/2020  9:13 PM    PCP:   No primary care provider on file. Code Status:  Full Code    Subjective:     C/C:  Fever/ vomiting/diarrhea  Interval History Status: improved. Patient seen and examined at bedside, seen after dialysis, no acute events overnight. He is feeling okay , other than fatigue no complaint  No more N/V/D  Echo done, results pending  Patient denies any chest pain, shortness of breath, chills, fevers, nausea or vomiting. Patient vitals, labs and all providers notes were reviewed,from overnight shift and morning updates were noted and discussed with the nurse    Brief History:     Souleymane Mistry is a 63 y.o. Non-/non  male who presents with No chief complaint on file. and is admitted to the hospital for the management of Streptococcal septicemia ([facility]      Patient who has a known history of end-stage renal disease on hemodialysis, hypertension, hyperlipidemia, DM, coronary disease, A. fib, COPD and asthma was transferred to our facility from [facility] ER where he presented from his dialysis center for fever that was around 102 degree and he was directed to go to the ER he does report 3 to 4 days of nausea vomiting and and diarrhea [loose watery stool once daily usually he has formed stool every other day) also noted fatigue decreased energy and some chills. Work-up in the ER was unremarkable for source of infection, he got dialyzed and given lack of beds he was transferred for further management here. Upon my evaluation patient She denies any chest pain, sob or headache. He does report that his nausea and vomiting are better overnight and denies any abdominal pain or other pain or other inflammation or redness in his skin. Patient has a very mild right foot dorsum swelling where he dropped something on it couple of days ago there is no limitation of movement or tenderness on palpation  He denies any recent URI symptoms or change in his daily habits or medications. Blood cultures were drawn yesterday and sent and came back positive for streptococcal agalactiae 2/2  Patient was initiated on Rocephin after culture results     Covid was checked yesterday and came back negative    Review of Systems:     Constitutional:  negative for chills, fevers, sweats. +ve fatigue  Respiratory:  negative for cough, dyspnea on exertion, shortness of breath, wheezing  Cardiovascular:  negative for chest pain, chest pressure/discomfort, lower extremity edema, palpitations  Gastrointestinal:  negative for abdominal pain, constipation, diarrhea, nausea, vomiting  Neurological:  negative for dizziness, headache    Medications: Allergies:     Allergies   Allergen Reactions    Spironolactone Itching and Other (See Comments)       Current Meds:   Scheduled Meds:    [START ON 10/20/2020] cinacalcet  60 mg Oral Once per day on Tue Thu Sat    midodrine  10 mg Oral TID     cefTRIAXone (ROCEPHIN) IV  2 g Intravenous Q24H    aspirin  81 mg Oral Daily    b complex-C-folic acid  1 mg Oral Daily    apixaban  5 mg Oral BID    methocarbamol  750 mg Oral TID    sodium chloride flush  10 mL Intravenous 2 times per day     Continuous Infusions:   PRN Meds: midodrine, sodium chloride flush, acetaminophen **OR** acetaminophen, polyethylene glycol, promethazine **OR** ondansetron, nicotine    Data:     Past Medical History:   has a past medical history of A-V fistula ([facility] AICD (automatic cardioverter/defibrillator) present, Asthma, Atrial fibrillation ([facility] Blood transfusion reaction, CAD (coronary artery disease), CHF (congestive heart failure) ([facility] CHF (congestive heart failure) ([facility] COPD (chronic obstructive pulmonary disease) ([facility] CRF (chronic renal failure), Diabetes mellitus ([facility] Dialysis patient ([facility] GERD (gastroesophageal reflux disease), Hemodialysis patient ([facility] Hypertension, and Obesity. Social History:   reports that he has never smoked. He has never used smokeless tobacco. He reports that he does not drink alcohol or use drugs. Family History:   Family History   Adopted: Yes       Vitals:  /83   Pulse 58   Temp 97.9 °F (36.6 °C) (Oral)   Resp 15   Ht 6' 1\" (1.854 m)   Wt 268 lb 4.8 oz (121.7 kg)   SpO2 98%   BMI 35.40 kg/m²   Temp (24hrs), Av.2 °F (36.8 °C), Min:96.4 °F (35.8 °C), Max:99.6 °F (37.6 °C)    No results for input(s): POCGLU in the last 72 hours. I/O (24Hr):     Intake/Output Summary (Last 24 hours) at 10/19/2020 1640  Last data filed at 10/19/2020 1200  Gross per 24 hour   Intake 1040 ml   Output --   Net 1040 ml       Labs:  Hematology:  Recent Labs     10/17/20  0536 10/18/20  0652   WBC 9.0 5.9   RBC 3.23* 2.96*   HGB 9.7* 8.9*   HCT 30.2* 28.6*   MCV 93.5 96.6   MCH 30.0 30.1   MCHC 32.1 31.1   RDW 14.1 14.3    152   MPV 11.4 11.3   INR  --  1.1     Chemistry:  Recent Labs     10/17/20  0536 10/17/20  1250 10/17/20  1915 10/18/20  0652   * and time and normal affect  Lungs:  Diminished bilaterally, normal effort  Heart:  regular rate and rhythm, no murmur  Abdomen:  soft, nontender, nondistended, normal bowel sounds, no masses, hepatomegaly, splenomegaly  Extremities:  no edema, redness, tenderness in the calves. RUE AV fistula  Skin:  no gross lesions, rashes, induration    Assessment:        Hospital Problems           Last Modified POA    * (Principal) Streptococcal septicemia ([facility] 10/18/2020 Yes    Fever of unknown origin 10/18/2020 Yes    Biventricular CHF (congestive heart failure) ([facility] with icd chronic  10/18/2020 Yes    A-fib ([facility] 10/18/2020 Yes    Overview Signed 2/3/2014  3:55 PM by Thang Landa,      Chads 2 score. On ASA.  Pt has refused coumadin therapy in the past.          ESRD (end stage renal disease) on dialysis ([facility] 10/18/2020 Yes    Anemia of chronic disease 10/18/2020 Yes    CAD (coronary artery disease) 10/18/2020 Yes    Overview Signed 10/18/2020  8:28 AM by MD dr Divya Sharif [place] cardiologist recently aicd check /clearance 4/15               Plan:        Streptococcal septicemia: Positive blood cultures 2/2   continue antibiotics  Echocardiogram, start with transthoracic , pending  Consulted ID, noted plan to do Cefazolin after dialysis till 10/30  Source is not clear yet, cruciate ID recommendations     Episode of hypotension : gave a small bolus overnight with improvement  Added Midodrine       End-stage renal disease on hemodialysis wounds: Consult nephrology for management     Anemia of chronic disease: IV Aranesp with dialysis per nephrology     Coronary artery disease status post stenting: Continue chronic medications     Chronic A. fib: On Eliquis, continue rate control     Elevated troponin: Likely type II secondary to sepsis, patient denies any chest pain and no acute changes, troponin is trending down after dialysis done yesterday     History of ischemic cardiomyopathy status post ICD placement     HTN: continue home medications     HPL: continue home medications     DM2: Continue diabetes home medications , insulin sliding scale, hypoglycemia protocol     DVT prophylaxis    Robson Shah MD  10/19/2020  4:40 PM

## 2020-10-20 ENCOUNTER — APPOINTMENT (OUTPATIENT)
Dept: DIALYSIS | Age: 60
DRG: 871 | End: 2020-10-20
Attending: FAMILY MEDICINE
Payer: COMMERCIAL

## 2020-10-20 LAB
ABSOLUTE EOS #: 0.44 K/UL (ref 0–0.44)
ABSOLUTE IMMATURE GRANULOCYTE: <0.03 K/UL (ref 0–0.3)
ABSOLUTE LYMPH #: 2.47 K/UL (ref 1.1–3.7)
ABSOLUTE MONO #: 1.07 K/UL (ref 0.1–1.2)
ANION GAP SERPL CALCULATED.3IONS-SCNC: 25 MMOL/L (ref 9–17)
BASOPHILS # BLD: 0 % (ref 0–2)
BASOPHILS ABSOLUTE: <0.03 K/UL (ref 0–0.2)
BUN BLDV-MCNC: 46 MG/DL (ref 6–20)
BUN/CREAT BLD: ABNORMAL (ref 9–20)
CALCIUM SERPL-MCNC: 8.7 MG/DL (ref 8.6–10.4)
CHLORIDE BLD-SCNC: 91 MMOL/L (ref 98–107)
CO2: 20 MMOL/L (ref 20–31)
CREAT SERPL-MCNC: 13.71 MG/DL (ref 0.7–1.2)
DIFFERENTIAL TYPE: ABNORMAL
EOSINOPHILS RELATIVE PERCENT: 6 % (ref 1–4)
GFR AFRICAN AMERICAN: 5 ML/MIN
GFR NON-AFRICAN AMERICAN: 4 ML/MIN
GFR SERPL CREATININE-BSD FRML MDRD: ABNORMAL ML/MIN/{1.73_M2}
GFR SERPL CREATININE-BSD FRML MDRD: ABNORMAL ML/MIN/{1.73_M2}
GLUCOSE BLD-MCNC: 85 MG/DL (ref 70–99)
HCT VFR BLD CALC: 31.3 % (ref 40.7–50.3)
HEMOGLOBIN: 9.5 G/DL (ref 13–17)
IMMATURE GRANULOCYTES: 0 %
LYMPHOCYTES # BLD: 35 % (ref 24–43)
MCH RBC QN AUTO: 29.9 PG (ref 25.2–33.5)
MCHC RBC AUTO-ENTMCNC: 30.4 G/DL (ref 28.4–34.8)
MCV RBC AUTO: 98.4 FL (ref 82.6–102.9)
MONOCYTES # BLD: 15 % (ref 3–12)
NRBC AUTOMATED: 0 PER 100 WBC
PDW BLD-RTO: 14.4 % (ref 11.8–14.4)
PLATELET # BLD: 180 K/UL (ref 138–453)
PLATELET ESTIMATE: ABNORMAL
PMV BLD AUTO: 12.2 FL (ref 8.1–13.5)
POTASSIUM SERPL-SCNC: 4.3 MMOL/L (ref 3.7–5.3)
RBC # BLD: 3.18 M/UL (ref 4.21–5.77)
RBC # BLD: ABNORMAL 10*6/UL
SEG NEUTROPHILS: 44 % (ref 36–65)
SEGMENTED NEUTROPHILS ABSOLUTE COUNT: 3.1 K/UL (ref 1.5–8.1)
SODIUM BLD-SCNC: 136 MMOL/L (ref 135–144)
WBC # BLD: 7.1 K/UL (ref 3.5–11.3)
WBC # BLD: ABNORMAL 10*3/UL

## 2020-10-20 PROCEDURE — 2580000003 HC RX 258: Performed by: FAMILY MEDICINE

## 2020-10-20 PROCEDURE — 6370000000 HC RX 637 (ALT 250 FOR IP): Performed by: FAMILY MEDICINE

## 2020-10-20 PROCEDURE — 99232 SBSQ HOSP IP/OBS MODERATE 35: CPT | Performed by: INTERNAL MEDICINE

## 2020-10-20 PROCEDURE — 6370000000 HC RX 637 (ALT 250 FOR IP): Performed by: INTERNAL MEDICINE

## 2020-10-20 PROCEDURE — 36415 COLL VENOUS BLD VENIPUNCTURE: CPT

## 2020-10-20 PROCEDURE — P9047 ALBUMIN (HUMAN), 25%, 50ML: HCPCS

## 2020-10-20 PROCEDURE — 85025 COMPLETE CBC W/AUTO DIFF WBC: CPT

## 2020-10-20 PROCEDURE — 80048 BASIC METABOLIC PNL TOTAL CA: CPT

## 2020-10-20 PROCEDURE — 6360000002 HC RX W HCPCS: Performed by: INTERNAL MEDICINE

## 2020-10-20 PROCEDURE — 5A1D70Z PERFORMANCE OF URINARY FILTRATION, INTERMITTENT, LESS THAN 6 HOURS PER DAY: ICD-10-PCS | Performed by: FAMILY MEDICINE

## 2020-10-20 PROCEDURE — 6360000002 HC RX W HCPCS

## 2020-10-20 PROCEDURE — 2580000003 HC RX 258: Performed by: INTERNAL MEDICINE

## 2020-10-20 PROCEDURE — 90935 HEMODIALYSIS ONE EVALUATION: CPT

## 2020-10-20 PROCEDURE — 99233 SBSQ HOSP IP/OBS HIGH 50: CPT | Performed by: FAMILY MEDICINE

## 2020-10-20 PROCEDURE — 90935 HEMODIALYSIS ONE EVALUATION: CPT | Performed by: INTERNAL MEDICINE

## 2020-10-20 PROCEDURE — 2060000000 HC ICU INTERMEDIATE R&B

## 2020-10-20 RX ORDER — ALBUMIN (HUMAN) 12.5 G/50ML
SOLUTION INTRAVENOUS
Status: COMPLETED
Start: 2020-10-20 | End: 2020-10-20

## 2020-10-20 RX ADMIN — METHOCARBAMOL TABLETS 750 MG: 750 TABLET, COATED ORAL at 08:00

## 2020-10-20 RX ADMIN — CINACALCET 60 MG: 30 TABLET ORAL at 08:01

## 2020-10-20 RX ADMIN — NEPHROCAP 1 MG: 1 CAP ORAL at 08:01

## 2020-10-20 RX ADMIN — MIDODRINE HYDROCHLORIDE 10 MG: 5 TABLET ORAL at 09:35

## 2020-10-20 RX ADMIN — SODIUM CHLORIDE, PRESERVATIVE FREE 10 ML: 5 INJECTION INTRAVENOUS at 20:07

## 2020-10-20 RX ADMIN — SODIUM CHLORIDE, PRESERVATIVE FREE 10 ML: 5 INJECTION INTRAVENOUS at 08:02

## 2020-10-20 RX ADMIN — ASPIRIN 81 MG: 81 TABLET, CHEWABLE ORAL at 08:00

## 2020-10-20 RX ADMIN — APIXABAN 5 MG: 5 TABLET, FILM COATED ORAL at 20:07

## 2020-10-20 RX ADMIN — ACETAMINOPHEN 650 MG: 325 TABLET ORAL at 23:54

## 2020-10-20 RX ADMIN — DEXTROSE MONOHYDRATE 3 G: 50 INJECTION, SOLUTION INTRAVENOUS at 10:45

## 2020-10-20 RX ADMIN — METHOCARBAMOL TABLETS 750 MG: 750 TABLET, COATED ORAL at 20:07

## 2020-10-20 RX ADMIN — ALBUMIN (HUMAN) 25 G: 0.25 INJECTION, SOLUTION INTRAVENOUS at 09:35

## 2020-10-20 RX ADMIN — METHOCARBAMOL TABLETS 750 MG: 750 TABLET, COATED ORAL at 14:49

## 2020-10-20 ASSESSMENT — PAIN SCALES - GENERAL
PAINLEVEL_OUTOF10: 0
PAINLEVEL_OUTOF10: 4
PAINLEVEL_OUTOF10: 0

## 2020-10-20 NOTE — PROGRESS NOTES
[facility] -Progress Note    Today's Date and Time: 10/20/2020, 8:36 AM    Impression :   · Streptococcal Group B septicemia , positive 2/2 blood cultures 10/17/2020. Source of septicemia unclear. Patient reports episodes of diarrhea prior to onset of illness. Denies any issues with the skin. He does not make any urine and did not have any  related symptoms. Presumably the septicemia is of bowel origin. · ESRD on Hemodialysis  · Anemia of chronic disease  · CAD S/P stenting  · Chronic A.Fib, rate controlled, on Eliquis  · HTN  · DM type II    Recommendations:   · Discontinue Ceftriaxone. · Will switch to 3 g of cefazolin post each dialysis, starting 10-20-20. Stop date 10-30-20. .  · Will need repeat blood cultures post treatment on 11-7-20. Medical Decision Making/Summary/Discussion:   ·   Infection Control Recommendations   · Currie Precautions    Antimicrobial Stewardship Recommendations     · Targeted therapy  · PK dosing    Coordination of Outpatient Care:   · Estimated Length of IV antimicrobials: 11/3/2020  · Patient will need Midline Catheter Insertion: no  · Patient will need PICC line Insertion:no  · Patient will need: Home IV , [place],  SNF,  LTAC: yes  · Patient will need outpatient wound care:no    Chief complaint/reason for consultation:   · Streptococcal Group B septicemia       History of Present Illness: Shaan Berger is a 63y.o.-year-old  male who was initially admitted on 10/17/2020. Patient seen at the request of Dr. Olinda Schmidt:  Patient is a 64 yo male with PMH of Dilated cardiomyopathy, s/p AICD,  ESRD on HD T-T-Sa was transferred from [facility] due to bed unavailability. On Wednesday, he reports feeling winded when climbing a flight of stairs, had to stop and take rest before feeling okay. The next day  he experienced chills, nausea, decreased appetite.  Continues to have these symptoms, on Saturday, he went for his dialysis, was told he had high grade fever of 102, sent to the [facility]. Transferred today to [facility]. Currently, patient reports SOB, when trying to climb stairs, nausea, decreased appetite since Wednesday. Is currently running a temperature of 102. Denies any chest pain, abdominal pain, vomiting. Reports loose stool in the morning. He does not urinate. Denies any runny nose, sore throat. PMH :Dilated CM, LVEF of 37% on last stress test, s/p AICD, stable; S/P lap cholecystectomy, ESRD on HD, history of PE on eliquis at home. On examination,  on room air, sitting comfortably without any labored breathing. RR 19, pulse 111, /77 . Does report one episode of loose stools this am.   He also experienced some chills. CURRENT EVALUATION: 10/20/20     Patient examined at bedside. Afebrile  VS stable    /70    Overnight no acute events. Patient denies any fever, chills , nausea. Reports improvement in his appetite. Denies loose stools. Feeling better with current treatment. Will receive Cefazolin with HD today to simplify outpatient treatment. Labs:    BUN: 38>22-->46  Creatinine: 11.64 > 7.82-->13.7    WBC: 9.0 > 5.9 -->7.1  Hgb: 9.7 > 8.9 -->9.5  Platelet: 834 > 150-->158    Cultures:  Urine:  ·   Blood:  · 10/17/20: Streptococcus agalactiae (Group B)  Sputum :  ·   Wound:     CSF         Discussed with patient, RN. I have personally reviewed the past medical history, past surgical history, medications, social history, and family history, and I have updated the database accordingly.   Past Medical History:     Past Medical History:   Diagnosis Date    [facility]V fistula ([facility]     lt arm    AICD (automatic cardioverter/defibrillator) present 2006    Asthma     Atrial fibrillation ([facility]     Blood transfusion reaction     CAD (coronary artery disease)     dr Danielle Jay cardiologist recently aicd check /clearance 4/15    CHF (congestive heart failure) ([facility]     CHF (congestive heart failure) (HCC)     COPD (chronic obstructive pulmonary disease) (HCC)     CRF (chronic renal failure)     Diabetes mellitus (HCC)     IDDM    [facility]     GERD (gastroesophageal reflux disease)     Hemodialysis patient ([facility]     amanda tues-thurs-sat    Hypertension     treated w/ meds    Obesity        Past Surgical  History:     Past Surgical History:   Procedure Laterality Date    CARDIAC DEFIBRILLATOR PLACEMENT  2006    PACEMAKER PLACEMENT      VASCULAR SURGERY      L avf       Medications:      cinacalcet  60 mg Oral Once per day on Tue Thu Sat    ceFAZolin  3 g Intravenous Once per day on Tue Thu Sat    midodrine  10 mg Oral TID WC    aspirin  81 mg Oral Daily    b complex-C-folic acid  1 mg Oral Daily    apixaban  5 mg Oral BID    methocarbamol  750 mg Oral TID    sodium chloride flush  10 mL Intravenous 2 times per day       Social History:     Social History     Socioeconomic History    Marital status: Legally      Spouse name: Not on file    Number of children: Not on file    Years of education: Not on file    Highest education level: Not on file   Occupational History    Not on file   Social Needs    Financial resource strain: Not on file    Food insecurity     Worry: Not on file     Inability: Not on file    Transportation needs     Medical: Not on file     Non-medical: Not on file   Tobacco Use    Smoking status: Never Smoker    Smokeless tobacco: Never Used   Substance and Sexual Activity    Alcohol use: No    Drug use: No    Sexual activity: Not on file   Lifestyle    Physical activity     Days per week: Not on file     Minutes per session: Not on file    Stress: Not on file   Relationships    Social connections     Talks on phone: Not on file     Gets together: Not on file     Attends Druze service: Not on file     Active member of club or organization: Not on file     Attends meetings of clubs or organizations: Not on file     Relationship status: Not on file    Intimate partner violence     Fear of current or ex partner: Not on file     Emotionally abused: Not on file     Physically abused: Not on file     Forced sexual activity: Not on file   Other Topics Concern    Not on file   Social History Narrative    Not on file       Family History:     Family History   Adopted: Yes        Allergies:   Spironolactone     Review of Systems:   Constitutional: Fever -, chills - , not in acute distress  Head: No headaches  Eyes: No double vision or blurry vision. No conjunctival inflammation. ENT: No sore throat or runny nose. . No hearing loss, tinnitus or vertigo. Cardiovascular: No chest pain or palpitations. No shortness of breath. No GRIMES  Lung: SOB +, No cough. No sputum production  Abdomen: No vomiting,  or abdominal pain. Rupesh Anmol No cramps. Diarrhea improved  Genitourinary: Doesn't urinate  Musculoskeletal: No muscle aches or pains. No joint effusions, swelling or deformities  Hematologic: No bleeding or bruising. Neurologic: No headache, weakness, numbness, or tingling. Integument: No rash, no ulcers. Psychiatric: No depression. Endocrine: No polyuria, no polydipsia, no polyphagia. Physical Examination :     Patient Vitals for the past 8 hrs:   BP Temp Temp src Pulse Resp SpO2 Weight   10/20/20 0600 -- -- -- -- -- -- 270 lb 15.1 oz (122.9 kg)   10/20/20 0425 125/70 97.9 °F (36.6 °C) Oral 71 18 99 % --     General Appearance: Awake, alert, and in no apparent distress  Head:  Normocephalic, no trauma  Eyes: Pupils equal, round, reactive to light and accommodation; extraocular movements intact; sclera anicteric; conjunctivae pink. No embolic phenomena. ENT: Oropharynx clear, without erythema, exudate, or thrush. No tenderness of sinuses. Mouth/throat: mucosa pink and moist. No lesions. Dentition in good repair. Neck:Supple, without lymphadenopathy. Thyroid normal, No bruits.   Pulmonary/Chest: Clear to auscultation, without lumen catheter has been removed.              Impression    Interval removal of right-sided double lumen catheter.  Chronic elevation the    right hemidiaphragm.  No acute cardiopulmonary process.             Medical Decision Making-Other: Thank you for allowing us to participate in the care of this patient. Please call with questions. Jojo Alejo     ATTESTATION:    I have discussed the case, including pertinent history and exam findings with the residents and students. I have seen and examined the patient and the key elements of the encounter have been performed by me. I was present when the student obtained his information or examined the patient. I have reviewed the laboratory data, other diagnostic studies and discussed them with the residents. I have updated the medical record where necessary. I agree with the assessment, plan and orders as documented by the resident/ student.     Terrence Anton MD.      Pager: [phone] - Office: [phone]

## 2020-10-20 NOTE — PLAN OF CARE
Problem: Pain:  Goal: Pain level will decrease  Description: Pain level will decrease  Outcome: Ongoing  Goal: Control of acute pain  Description: Control of acute pain  Outcome: Ongoing     Problem: Falls - Risk of:  Goal: Will remain free from falls  Description: Will remain free from falls  Outcome: Ongoing  Goal: Absence of physical injury  Description: Absence of physical injury  Outcome: Ongoing

## 2020-10-20 NOTE — FLOWSHEET NOTE
DATE: 10/20/2020    NAME: Ryland Valdivia  MRN: [id number]   : 1960    Patient not seen this date for Physical Therapy due to:  [] Blood transfusion in progress  [x] Hemodialysis  []  Patient Declined  [] Spine Precautions   [] Strict Bedrest  [] Surgery/ Procedure  [] Testing      [] Other        [] PT being discontinued at this time. Patient independent. No further needs. [] PT being discontinued at this time as the patient has been transferred to palliative care. No further needs.     Irma Dates, PTA

## 2020-10-20 NOTE — PROGRESS NOTES
[facility]  Documentation Note                Admission Dx: Fever of unknown origin [R50.9]    Past Medical History:   has a past medical history of [facility] fistula ([facility] AICD (automatic cardioverter/defibrillator) present, Asthma, Atrial fibrillation ([facility] Blood transfusion reaction, CAD (coronary artery disease), CHF (congestive heart failure) ([facility] CHF (congestive heart failure) ([facility] COPD (chronic obstructive pulmonary disease) ([facility] CRF (chronic renal failure), Diabetes mellitus ([facility] Dialysis patient ([facility] GERD (gastroesophageal reflux disease), Hemodialysis patient ([facility] Hypertension, and Obesity. Previous Testing:     ECHO 10/19/2020: EF 35%, grade I DD, mild LVH/MR, trivial-mild TR, RVSP is 23 mmHg. TILT 2/3/14: Inconclusive for VD syndrome due to incomplete test.     Previous office/hospital visit:   Dr. Indra Francisco 5/21/15:   Chronic systolic HF due to [address] [place] [address] --  1. KEEP HGB >7.5-8  2.  WILL CONSIDER SMALL DOSE BB IF BP REMAINS STABLE    [facility]

## 2020-10-20 NOTE — PROGRESS NOTES
[facility]  Office: [address], DO, Mi Pierre, DO, Lidia Fuchs, DO, Flash Callejas, DO, Kim Sanchez MD, Karli Gallo MD, Madeline Perry MD, Aide Bean MD, Reanna Ahmadi MD, Brenda Nguyen MD, Ching Pinto MD, Karlee Guardado MD, Prabhjot Galindo MD, Benny Chester DO, Dagoberto Powers MD, Sharlene Parnell MD, Trevor Grewal DO, Wanda Berman MD,  Tai Morris, DO, Vasile Garza MD, Ananya Vásquez MD, Blade Neil [facility], [facility], [facility], Chasidy Frey, CNP, Ashish Bird, CNS, Albaro Dash, CNP, Figueroa Back, CNP, Dexter Walden, CNP, Dionicia Frankel, [facility], Brynn Andino, CNP, Mary Canseco PA-C, Zaire Miller, [facility], Reymundo Becerra, CNP, Lobo Up, CNP, Jluis Maldonado, CNP, Ck Dc, CNP, Ashley Lyles, [facility]   [facility]    Progress Note    10/20/2020    9:27 AM    Name:   Maryam Santizo  MRN:     [id number]     Acct:      [de-identified]   Room:   [address] [place] [address] Day:  3  Admit Date:  10/17/2020  9:13 PM    PCP:   No primary care provider on file. Code Status:  Full Code    Subjective:     C/C: No chief complaint on file. bacteremia   Interval History Status: improved. Pt was seen and examined this morning  No new complaints at this time          Review of Systems:     12 point ROS performed today and negative for anything other than what was stated in subjective     Medications: Allergies:     Allergies   Allergen Reactions    Spironolactone Itching and Other (See Comments)       Current Meds:   Scheduled Meds:    albumin human        cinacalcet  60 mg Oral Once per day on Tue Thu Sat    ceFAZolin  3 g Intravenous Once per day on Tue Thu Sat    midodrine  10 mg Oral TID WC    aspirin  81 mg Oral Daily    b complex-C-folic acid  1 mg Oral Daily    apixaban  5 mg Oral BID    methocarbamol  750 mg Oral TID    sodium chloride flush  10 mL Intravenous 2 times per day TROPHS 154* 186* 167*   No results for input(s): PROT, LABALBU, LABA1C, Y1LDFZG, H1GTMGQ, FT4, TSH, AST, ALT, LDH, GGT, ALKPHOS, LABGGT, BILITOT, BILIDIR, AMMONIA, AMYLASE, LIPASE, LACTATE, CHOL, HDL, LDLCHOLESTEROL, CHOLHDLRATIO, TRIG, VLDL, MKU67GX, PHENYTOIN, PHENYF, URICACID, POCGLU in the last 72 hours. ABG:  Lab Results   Component Value Date    POCPH 7.43 05/22/2015    POCPCO2 42 05/22/2015    POCPO2 110 05/22/2015    POCHCO3 27.8 05/22/2015    NBEA NOT REPORTED 05/22/2015    PBEA 3 05/22/2015    JZJ0CTI 29 05/22/2015    VFFT2JUS 98 05/22/2015    FIO2 30.0 05/22/2015     Lab Results   Component Value Date/Time    SPECIAL LTAC,10CC 10/17/2020 11:00 AM    SPECIAL RT HAND,8CC 10/17/2020 11:00 AM     Lab Results   Component Value Date/Time    CULTURE (A) 10/17/2020 11:00 AM     POSITIVE Blood Culture Results called to and read back by: HODAN Foster ON 10/18/20    CULTURE  10/17/2020 11:00 AM     DIRECT GRAM STAIN FROM BOTTLE: GRAM POSITIVE COCCI IN CHAINS    CULTURE Streptococcus agalactiae (Group B) detected by PCR (A) 10/17/2020 11:00 AM    CULTURE STREPTOCOCCUS AGALACTIAE (GROUP B) (A) 10/17/2020 11:00 AM    CULTURE (A) 10/17/2020 11:00 AM     POSITIVE Blood Culture Results called to and read back by: HODAN LOPEZ AT 0573 ON 10/18/20    CULTURE  10/17/2020 11:00 AM     DIRECT GRAM STAIN FROM BOTTLE: GRAM POSITIVE COCCI IN CHAINS    CULTURE (A) 10/17/2020 11:00 AM     STREPTOCOCCUS AGALACTIAE (GROUP B) For susceptibility, refer to previous culture. Radiology:  Xr Chest Portable    Result Date: 10/17/2020  Interval removal of right-sided double lumen catheter. Chronic elevation the right hemidiaphragm. No acute cardiopulmonary process.        Physical Examination:        General appearance:  alert, cooperative and no distress  Mental Status:  oriented to person, place and time and normal affect  Lungs:  clear to auscultation bilaterally, normal effort  Heart:  regular rate and rhythm, no completed, showed LVEF 35% with grade 1 LV diastolic dysfunction.  Not much different from ECHO in 2015  - not on any diuretics   - cardiology consulted     Rafal Alonzo MD  10/20/2020  9:27 AM

## 2020-10-20 NOTE — PROGRESS NOTES
Dialysis Post Treatment Note  Patient tolerated treatment well. Denies complaints at time of discharge. Vitals:    10/20/20 1219   BP: 120/69   Pulse: 79   Resp: 16   Temp: 98 °F (36.7 °C)   SpO2:      Pre-Weight = 120.8  Post-weight = Weight: 266 lb 5.1 oz (120.8 kg)  Total Liters Processed = Total Liters Processed (l/min): 87.7 l/min  Rinseback Volume (mL) = Rinseback Volume (ml): 310 ml  Net Removal (mL) = 2020  Length of treatment=210  Access: graft patent tx deysi well .

## 2020-10-20 NOTE — PROGRESS NOTES
Renal Progress Note    Patient :  Perry Wood; 63 y.o. MRN# [id number]  Location:  8366/7484-62  Attending:  Ani Dior MD  Admit Date:  10/17/2020   Hospital Day: 3      Subjective:     Patient was seen and examined on hemodialysis. Patient was lying flat. He was alert and awake. He is still has poor appetite. Patient was 2 kg below his dry weight. His dry weight is 122 kg and he was 120 today. His current dialysis access is right arm AV fistula but he had previous grafts both right and left arm. Denies any respiratory symptoms  Denies any  symptoms. No abdominal distention. Outpatient Medications:     Medications Prior to Admission: doxycycline hyclate (VIBRAMYCIN) 100 MG capsule, Take 100 mg by mouth 2 times daily  methocarbamol (ROBAXIN) 750 MG tablet, Take 750 mg by mouth 3 times daily  ELIQUIS 5 MG TABS tablet, TAKE ONE TABLET BY MOUTH TWICE DAILY  losartan (COZAAR) 25 MG tablet, TAKE ONE TABLET BY MOUTH EVERY DAY  aspirin 81 MG chewable tablet, Take 81 mg by mouth daily  midodrine (PROAMATINE) 5 MG tablet, Take 10 mg by mouth as needed  HYDROcodone-acetaminophen (NORCO) 5-325 MG per tablet, Take 1 tablet by mouth every 6 hours as needed for Pain.   methylPREDNISolone (MEDROL) 4 MG tablet, Take 2 mg by mouth daily  lanthanum (FOSRENOL) 1000 MG chewable tablet, CHEW AND SWALLOW 1 TABLET THREE TIMES A DAY WITH MEALS  metoprolol succinate (TOPROL XL) 25 MG extended release tablet, TAKE ONE TABLET BY MOUTH EVERY DAY  oxyCODONE-acetaminophen (PERCOCET) 5-325 MG per tablet, TAKE ONE TABLET BY MOUTH EVERY 4 HOURS AS NEEDED  B Complex-C-Folic Acid (LOLA-GRACE) TABS, TAKE 1 TABLET BY MOUTH EVERY EVENING    Current Medications:     Scheduled Meds:    albumin human        cinacalcet  60 mg Oral Once per day on Tue Thu Sat    ceFAZolin  3 g Intravenous Once per day on Tue Thu Sat    midodrine  10 mg Oral TID WC    aspirin  81 mg Oral Daily    b complex-C-folic acid  1 mg Oral Daily    apixaban  5 mg Oral BID    methocarbamol  750 mg Oral TID    sodium chloride flush  10 mL Intravenous 2 times per day     Input/Output:       I/O last 3 completed shifts: In: 670 [P.O.:660; I.V.:10]  Out: - .      Patient Vitals for the past 96 hrs (Last 3 readings):   Weight   10/20/20 0600 270 lb 15.1 oz (122.9 kg)   10/19/20 0555 268 lb 4.8 oz (121.7 kg)   10/17/20 2228 267 lb 13.7 oz (121.5 kg)       Vital Signs:   Temperature:  Temp: 97.9 °F (36.6 °C)  TMax:   Temp (24hrs), Av.7 °F (36.5 °C), Min:97.2 °F (36.2 °C), Max:98 °F (36.7 °C)    Respirations:  Resp: 18  Pulse:   Pulse: 71  BP:    BP: 125/70  BP Range: Systolic (57FRV), ZLX:774 , Min:108 , INX:896       Diastolic (66QGV), NGB:38, Min:60, Max:101      Physical Examination:     General:  Patient was lying flat he was alert and oriented x3. Dialysis was in progress  HEENT: Normocephalic, no tenderness over frontal or maxillary sinuses   eyes:   Pupils were reactive to light  Neck:   Supple  Chest:   Bilateral air entry normal vesicular breathing  Cardiac:  S1 and S2 audible no S3 or murmur. Abdomen: Soft and nontender bowel sounds are positive. :   No suprapubic or flank tenderness. Neuro:  AAO x 3, No FND. SKIN:  No rashes, good skin turgor. Extremities:  No edema  No tenderness around AICD  Labs:       Recent Labs     10/18/20  0652   WBC 5.9   RBC 2.96*   HGB 8.9*   HCT 28.6*   MCV 96.6   MCH 30.1   MCHC 31.1   RDW 14.3      MPV 11.3      BMP:   Recent Labs     10/18/20  0652   *   K 4.4   CL 91*   CO2 27   BUN 22*   CREATININE 7.82*   GLUCOSE 78   CALCIUM 9.2      Radiology:    Reviewed. Assessment:     1. ESRD on HD on [facility]TS schedule [facility] under Dr. Karyn Cancel via right arm AV fistula. Patient is 2 g under his dry weight. There is no BMP available from today. Dialysis orders are based on his prior basic metabolic profile and we will adjust the bath once available. We will try to remove 3 kg if tolerated.   Patient is now on cefazolin. 2.  Streptococcal group B septicemia on IV antibiotics per ID. 3.  Anemia of ESRD  4. Coronary artery disease status post stent. 5.  Diabetes type 2.  6.  Essential hypertension. 7.  A. fib. 8.  Ischemic cardiomyopathy status post AICD. Plan:   1. Dialysis orders will change bath once BMP is available  2. Antibiotic as per infectious diseases  3. Aranesp and Hectorol as per outpatient orders  4. As needed albumin and ProAmatine for low blood pressure  Nutrition   Please ensure that patient is on a renal diet/TF. Avoid nephrotoxic drugs/contrast exposure. We will continue to follow along with you. Gabi Galan MD  [facility]     This note is created with the assistance of a speech-recognition program. While intending to generate a document that actually reflects the content of the visit, no guarantees can be provided that every mistake has been identified and corrected by editing.

## 2020-10-20 NOTE — PROGRESS NOTES
[facility]  Occupational Therapy Not Seen Note    DATE: 10/20/2020  Name: Greyson Bansal  : 1960  MRN: [id number]    Patient not available for Occupational Therapy due to:    [] Testing:    [x] Hemodialysis    [] Blood Transfusion in Progress    []Refusal by Patient:    [] Surgery/Procedure:    [] Strict Bedrest    [] Sedation    [] Spine Precautions     [] Pt being transferred to palliative care at this time. Spoke with pt/family and OT services to be defered. [] Pt independent with functional mobility and functional tasks.  Pt with no OT acute care needs at this time, will defer OT eval.    [] Other    Next Scheduled Treatment: Ck 10/21    IVIS Bustillos/L

## 2020-10-21 VITALS
RESPIRATION RATE: 20 BRPM | BODY MASS INDEX: 35.3 KG/M2 | SYSTOLIC BLOOD PRESSURE: 152 MMHG | HEIGHT: 73 IN | DIASTOLIC BLOOD PRESSURE: 98 MMHG | OXYGEN SATURATION: 97 % | WEIGHT: 266.32 LBS | HEART RATE: 82 BPM | TEMPERATURE: 98.3 F

## 2020-10-21 LAB
ALBUMIN SERPL-MCNC: 3.5 G/DL (ref 3.5–5.2)
ALBUMIN/GLOBULIN RATIO: 0.9 (ref 1–2.5)
ALP BLD-CCNC: 65 U/L (ref 40–129)
ALT SERPL-CCNC: 10 U/L (ref 5–41)
ANION GAP SERPL CALCULATED.3IONS-SCNC: 17 MMOL/L (ref 9–17)
AST SERPL-CCNC: 19 U/L
BILIRUB SERPL-MCNC: 0.3 MG/DL (ref 0.3–1.2)
BUN BLDV-MCNC: 29 MG/DL (ref 6–20)
BUN/CREAT BLD: ABNORMAL (ref 9–20)
CALCIUM SERPL-MCNC: 8.3 MG/DL (ref 8.6–10.4)
CHLORIDE BLD-SCNC: 96 MMOL/L (ref 98–107)
CO2: 22 MMOL/L (ref 20–31)
CREAT SERPL-MCNC: 9.98 MG/DL (ref 0.7–1.2)
GFR AFRICAN AMERICAN: 7 ML/MIN
GFR NON-AFRICAN AMERICAN: 5 ML/MIN
GFR SERPL CREATININE-BSD FRML MDRD: ABNORMAL ML/MIN/{1.73_M2}
GFR SERPL CREATININE-BSD FRML MDRD: ABNORMAL ML/MIN/{1.73_M2}
GLUCOSE BLD-MCNC: 76 MG/DL (ref 70–99)
HCT VFR BLD CALC: 27.8 % (ref 40.7–50.3)
HEMOGLOBIN: 8.9 G/DL (ref 13–17)
MCH RBC QN AUTO: 31.1 PG (ref 25.2–33.5)
MCHC RBC AUTO-ENTMCNC: 32 G/DL (ref 28.4–34.8)
MCV RBC AUTO: 97.2 FL (ref 82.6–102.9)
NRBC AUTOMATED: 0 PER 100 WBC
PDW BLD-RTO: 14.6 % (ref 11.8–14.4)
PLATELET # BLD: 222 K/UL (ref 138–453)
PMV BLD AUTO: 11.9 FL (ref 8.1–13.5)
POTASSIUM SERPL-SCNC: 4.4 MMOL/L (ref 3.7–5.3)
RBC # BLD: 2.86 M/UL (ref 4.21–5.77)
SODIUM BLD-SCNC: 135 MMOL/L (ref 135–144)
TOTAL PROTEIN: 7.5 G/DL (ref 6.4–8.3)
WBC # BLD: 5.8 K/UL (ref 3.5–11.3)

## 2020-10-21 PROCEDURE — 36415 COLL VENOUS BLD VENIPUNCTURE: CPT

## 2020-10-21 PROCEDURE — 99232 SBSQ HOSP IP/OBS MODERATE 35: CPT | Performed by: INTERNAL MEDICINE

## 2020-10-21 PROCEDURE — 6370000000 HC RX 637 (ALT 250 FOR IP): Performed by: FAMILY MEDICINE

## 2020-10-21 PROCEDURE — 2580000003 HC RX 258: Performed by: FAMILY MEDICINE

## 2020-10-21 PROCEDURE — 85027 COMPLETE CBC AUTOMATED: CPT

## 2020-10-21 PROCEDURE — 99239 HOSP IP/OBS DSCHRG MGMT >30: CPT | Performed by: FAMILY MEDICINE

## 2020-10-21 PROCEDURE — 97116 GAIT TRAINING THERAPY: CPT

## 2020-10-21 PROCEDURE — 80053 COMPREHEN METABOLIC PANEL: CPT

## 2020-10-21 PROCEDURE — 99231 SBSQ HOSP IP/OBS SF/LOW 25: CPT | Performed by: INTERNAL MEDICINE

## 2020-10-21 PROCEDURE — 97110 THERAPEUTIC EXERCISES: CPT

## 2020-10-21 RX ORDER — CINACALCET 60 MG/1
TABLET, FILM COATED ORAL
Qty: 30 TABLET | Refills: 3 | Status: SHIPPED | OUTPATIENT
Start: 2020-10-21

## 2020-10-21 RX ORDER — CEFAZOLIN SODIUM 1 G/3ML
INJECTION, POWDER, FOR SOLUTION INTRAMUSCULAR; INTRAVENOUS
Qty: 3000 MG | Refills: 0 | Status: ON HOLD | OUTPATIENT
Start: 2020-10-21 | End: 2022-02-26

## 2020-10-21 RX ADMIN — ASPIRIN 81 MG: 81 TABLET, CHEWABLE ORAL at 08:35

## 2020-10-21 RX ADMIN — SODIUM CHLORIDE, PRESERVATIVE FREE 10 ML: 5 INJECTION INTRAVENOUS at 08:36

## 2020-10-21 RX ADMIN — MIDODRINE HYDROCHLORIDE 10 MG: 5 TABLET ORAL at 08:35

## 2020-10-21 RX ADMIN — METHOCARBAMOL TABLETS 750 MG: 750 TABLET, COATED ORAL at 13:36

## 2020-10-21 RX ADMIN — APIXABAN 5 MG: 5 TABLET, FILM COATED ORAL at 08:34

## 2020-10-21 RX ADMIN — METHOCARBAMOL TABLETS 750 MG: 750 TABLET, COATED ORAL at 08:36

## 2020-10-21 RX ADMIN — NEPHROCAP 1 MG: 1 CAP ORAL at 08:34

## 2020-10-21 ASSESSMENT — ENCOUNTER SYMPTOMS
BACK PAIN: 1
APNEA: 0
COLOR CHANGE: 0
SHORTNESS OF BREATH: 0
SINUS PAIN: 0
EYES NEGATIVE: 1
ABDOMINAL DISTENTION: 0
CHEST TIGHTNESS: 0
CONSTIPATION: 0

## 2020-10-21 NOTE — PROGRESS NOTES
Physical Therapy  Facility/Department: [address] STEPDOWN  Daily Treatment Note  NAME: Yasmine Lennon  : 1960  MRN: [id number]    Date of Service: 10/21/2020    Discharge Recommendations:  Patient would benefit from continued therapy after discharge   PT Equipment Recommendations  Equipment Needed: Yes  Mobility Devices: Jessica Lynsey: Rolling    Assessment   Body structures, Functions, Activity limitations: Decreased functional mobility ; Decreased strength;Decreased balance;Decreased endurance  Assessment: Pt amb 300 ft wtih RW and CGA. Would benefit from continued PT after d/c on home health or OP basis to address deficits  Prognosis: Good  REQUIRES PT FOLLOW UP: Yes  Activity Tolerance  Activity Tolerance: Patient Tolerated treatment well     Patient Diagnosis(es): There were no encounter diagnoses. has a past medical history of A-V fistula ([facility] AICD (automatic cardioverter/defibrillator) present, Asthma, Atrial fibrillation ([address] Blood transfusion reaction, CAD (coronary artery disease), CHF (congestive heart failure) ([facility] CHF (congestive heart failure) ([address] COPD (chronic obstructive pulmonary disease) ([address] CRF (chronic renal failure), Diabetes mellitus ([address] Dialysis patient ([address] GERD (gastroesophageal reflux disease), Hemodialysis patient ([address] Hypertension, and Obesity. has a past surgical history that includes vascular surgery; Cardiac defibrillator placement (); and pacemaker placement. Restrictions  Restrictions/Precautions  Restrictions/Precautions: Up as Tolerated  Required Braces or Orthoses?: No  Subjective   General  Response To Previous Treatment: Patient with no complaints from previous session.   Family / Caregiver Present: No  Subjective  Subjective: Pt resting in bed upon arrival, agreeable to PT, pleasant and cooperative  Pain Screening  Patient Currently in Pain: Denies  Vital Signs  Patient Currently in Pain: Denies       Orientation  Orientation  Overall Orientation Status: Within Normal Limits  Cognition      Objective   Bed mobility  Rolling to Left: Independent  Rolling to Right: Independent  Supine to Sit: Independent  Sit to Supine: Independent  Scooting: Independent  Transfers  Sit to Stand: Contact guard assistance  Stand to sit: Contact guard assistance  Ambulation  Ambulation?: Yes  Ambulation 1  Surface: level tile  Device: Rolling Walker  Assistance: Stand by assistance  Quality of Gait: decreased samson, wide BRANT, steady, no LOB  Gait Deviations: Slow Samson; Increased BRANT  Distance: 300 ft  Stairs/Curb  Stairs?: No     Balance  Posture: Good  Sitting - Static: Good  Sitting - Dynamic: Good  Standing - Static: Fair;+  Standing - Dynamic: Fair;+  Comments: with RW     Exercise  Seated LE exercise program: Long Arc Quads, hip abduction/adduction, heel/toe raises, and marches. Reps: 10x  Standing marching in place, mini squats, SLR x 10 with Rw    Goals  Short term goals  Time Frame for Short term goals: 10 visits  Short term goal 1: indep with transfers  Short term goal 2: amb without  ft with SBA  Short term goal 3: negotiate 7 steps SBA with one HR  Short term goal 4: improve balance in standing to good-  Short term goal 5: increase LE strength 1/2 grade  Patient Goals   Patient goals : to get stronger    Plan    Plan  Times per week: 4-5x per week  Plan weeks: 10 visits  Specific instructions for Next Treatment: therex, balance gait  Current Treatment Recommendations: Strengthening, Transfer Training, Endurance Training, Neuromuscular Re-education, Balance Training, Gait Training, Stair training, Functional Mobility Training  Safety Devices  Type of devices:  All fall risk precautions in place, Call light within reach, Left in chair, Nurse notified  Restraints  Initially in place: No     Therapy Time   Individual Concurrent Group Co-treatment   Time In 1108         Time Out 1132         Minutes 24         Timed Code Treatment Minutes: 24 Minutes

## 2020-10-21 NOTE — PROGRESS NOTES
went for his dialysis, was told he had high grade fever of 102, sent to the [facility] [address] [facility]. Transferred today to [facility]. Currently, patient reports SOB, when trying to climb stairs, nausea, decreased appetite since Wednesday. Is currently running a temperature of 102. Denies any chest pain, abdominal pain, vomiting. Reports loose stool in the morning. He does not urinate. Denies any runny nose, sore throat. PMH :Dilated CM, LVEF of 37% on last stress test, s/p AICD, stable; S/P lap cholecystectomy, ESRD on HD, history of PE on eliquis at home. On examination,  on room air, sitting comfortably without any labored breathing. RR 19, pulse 111, /77 . Does report one episode of loose stools this am.   He also experienced some chills. CURRENT EVALUATION: 10/21/20     Patient examined at bedside. Afebrile  VS stable  On room air    /70    Overnight no acute events. Patient denies any fever, chills , nausea. Reports improvement in his appetite. Denies loose stools. Reports some pain in right foot. Dropped a heavy object on his leg/foot. Relieved by tylenol, given yesterday pm.   Feeling better with current treatment. Received Cefazolin with HD yesterday. Discharge planning for tomorrow. Labs:    BUN: 38>22-->46-->29  Creatinine: 11.64 > 7.82-->13.7-->9.98    WBC: 9.0 > 5.9 -->5.8  Hgb: 9.7 > 8.9 -->9.5-->8.9  Platelet: 027 > 601-->752    Cultures:  Urine:  ·   Blood:  · 10/17/20: Streptococcus agalactiae (Group B)  Sputum :  ·   Wound:     CSF         Discussed with patient, RN. I have personally reviewed the past medical history, past surgical history, medications, social history, and family history, and I have updated the database accordingly.   Past Medical History:     Past Medical History:   Diagnosis Date    A-V fistula ([facility]     lt arm    AICD (automatic cardioverter/defibrillator) present 2006    Asthma     Atrial fibrillation ([facility]     Blood transfusion reaction  CAD (coronary artery disease)     dr Scott Molina cardiologist recently aicd check /clearance 4/15    CHF (congestive heart failure) (HCC)     CHF (congestive heart failure) (HCC)     COPD (chronic obstructive [facility] disease) ([facility]     CRF (chronic renal failure)     Diabetes mellitus ([facility]     IDDM    Dialysis patient ([facility]     GERD (gastroesophageal reflux disease)     Hemodialysis patient ([facility]     amanda tues-thurs-sat    Hypertension     treated w/ meds    Obesity        Past Surgical  History:     Past Surgical History:   Procedure Laterality Date    CARDIAC DEFIBRILLATOR PLACEMENT  2006    PACEMAKER PLACEMENT      VASCULAR SURGERY      L avf       Medications:      cinacalcet  60 mg Oral Once per day on Tue Thu Sat    ceFAZolin  3 g Intravenous Once per day on Tue Thu Sat    midodrine  10 mg Oral TID WC    aspirin  81 mg Oral Daily    b complex-C-folic acid  1 mg Oral Daily    apixaban  5 mg Oral BID    methocarbamol  750 mg Oral TID    sodium chloride flush  10 mL Intravenous 2 times per day       Social History:     Social History     Socioeconomic History    Marital status: Legally      Spouse name: Not on file    Number of children: Not on file    Years of education: Not on file    Highest education level: Not on file   Occupational History    Not on file   Social Needs    Financial resource strain: Not on file    Food insecurity     Worry: Not on file     Inability: Not on file    Transportation needs     Medical: Not on file     Non-medical: Not on file   Tobacco Use    Smoking status: Never Smoker    Smokeless tobacco: Never Used   Substance and Sexual Activity    Alcohol use: No    Drug use: No    Sexual activity: Not on file   Lifestyle    Physical activity     Days per week: Not on file     Minutes per session: Not on file    Stress: Not on file   Relationships    Social connections     Talks on phone: Not on file     Gets together: Not on file Attends Yazidism service: Not on file     Active member of club or organization: Not on file     Attends meetings of clubs or organizations: Not on file     Relationship status: Not on file    Intimate partner violence     Fear of current or ex partner: Not on file     Emotionally abused: Not on file     Physically abused: Not on file     Forced sexual activity: Not on file   Other Topics Concern    Not on file   Social History Narrative    Not on file       Family History:     Family History   Adopted: Yes        Allergies:   Spironolactone     Review of Systems:   Constitutional: Fever -, chills - , not in acute distress  Head: No headaches  Eyes: No double vision or blurry vision. No conjunctival inflammation. ENT: No sore throat or runny nose. . No hearing loss, tinnitus or vertigo. Cardiovascular: No chest pain or palpitations. No shortness of breath. No GRIMES  Lung: SOB +, No cough. No sputum production  Abdomen: No vomiting,  or abdominal pain. Bula Dayhoff No cramps. Diarrhea improved  Genitourinary: Doesn't urinate  Musculoskeletal: No muscle aches or pains. No joint effusions, swelling or deformities  Hematologic: No bleeding or bruising. Neurologic: No headache, weakness, numbness, or tingling. Integument: No rash, no ulcers. Psychiatric: No depression. Endocrine: No polyuria, no polydipsia, no polyphagia. Physical Examination :     Patient Vitals for the past 8 hrs:   BP Temp Temp src Pulse Resp SpO2   10/21/20 0731 133/72 99 °F (37.2 °C) Oral 65 17 97 %   10/21/20 0405 135/73 97.8 °F (36.6 °C) Oral 64 14 --     General Appearance: Awake, alert, and in no apparent distress  Head:  Normocephalic, no trauma  Eyes: Pupils equal, round, reactive to light and accommodation; extraocular movements intact; sclera anicteric; conjunctivae pink. No embolic phenomena. ENT: Oropharynx clear, without erythema, exudate, or thrush. No tenderness of sinuses. Mouth/throat: mucosa pink and moist. No lesions.  Dentition in left    with intact lead in appropriate position.  Heart size is stable.  No vascular    congestion, focal consolidation, effusion, or pneumothorax is noted.  Osseous    and mediastinal structures are age-appropriate.  Prior right-sided double    lumen catheter has been removed.              Impression    Interval removal of right-sided double lumen catheter.  Chronic elevation the    right hemidiaphragm.  No acute cardiopulmonary process.             Medical Decision Making-Other: Thank you for allowing us to participate in the care of this patient. Please call with questions. Sujatha Hall MD     ATTESTATION:    I have discussed the case, including pertinent history and exam findings with the residents and students. I have seen and examined the patient and the key elements of the encounter have been performed by me. I was present when the student obtained his information or examined the patient. I have reviewed the laboratory data, other diagnostic studies and discussed them with the residents. I have updated the medical record where necessary. I agree with the assessment, plan and orders as documented by the resident/ student.     Bharati Mendez MD.    Pager: [phone] - Office: [phone]

## 2020-10-21 NOTE — CONSULTS
[facility]    Inpatient Consultation Note               Today's Date: 10/21/2020  Patient Name: Maryam Santizo  Date of admission: 10/17/2020  9:13 PM  Patient's age: 63 y. o., 1960  Admission Dx: Fever of unknown origin [R50.9]    Reason for  Consult: Elevated troponins with history of CAD, CHF    Requesting Physician: Vasile Garza MD    CHIEF COMPLAINT:  Fever   No chief complaint on file. History Obtained From:  patient    HISTORY OF PRESENT ILLNESS:      The patient is a 63 y.o. male with past history of Non-ischemic cardiomyopathy s/p AICD, atrial fibrillation, hypertension, diabetes and ESRD on hemodialysis was transferred to our facility from [facility] ER where he presented from his dialysis center for fever that was around 102 degree and he was directed to go to the ER, he does report 3 to 4 days of nausea vomiting and and diarrhea. Blood cultures growing Streptococcus group B and is currently on antibiotics. Patient denied chest pain, shortness of breath, palpitations, dizziness or leg swelling. He has mild activity at home because of weakness in legs and fatigue. He has history of nonischemic cardiomyopathy with last EF of 35% and he is s/p AICD placement. He has chronic atrial fibrillation and is on Eliquis, rhythm is sinus and rate controlled. Troponins are elevated to 140>154>186>167. EKG is normal sinus rhythm with nonspecific ST and T wave abnormalities.      Past Medical History:   has a past medical history of A-V fistula ([address] AICD (automatic cardioverter/defibrillator) present, Asthma, Atrial fibrillation ([address] Blood transfusion reaction, CAD (coronary artery disease), CHF (congestive heart failure) ([address] CHF (congestive heart failure) ([address] COPD (chronic obstructive pulmonary disease) ([address] CRF (chronic renal failure), Diabetes mellitus ([address] Dialysis patient ([address] GERD (gastroesophageal reflux disease), Hemodialysis patient ([address] Hypertension, and Obesity. Past Surgical History:   has a past surgical history that includes vascular surgery; Cardiac defibrillator placement (2006); and pacemaker placement. Home Medications:    Prior to Admission medications    Medication Sig Start Date End Date Taking? Authorizing Provider   doxycycline hyclate (VIBRAMYCIN) 100 MG capsule Take 100 mg by mouth 2 times daily   Yes Historical Provider, MD   methocarbamol (ROBAXIN) 750 MG tablet Take 750 mg by mouth 3 times daily   Yes Historical Provider, MD   ELIQUIS 5 MG TABS tablet TAKE ONE TABLET BY MOUTH TWICE DAILY 7/29/19  Yes Historical Provider, MD   losartan (COZAAR) 25 MG tablet TAKE ONE TABLET BY MOUTH EVERY DAY 7/3/19  Yes Historical Provider, MD   aspirin 81 MG chewable tablet Take 81 mg by mouth daily   Yes Historical Provider, MD   midodrine (PROAMATINE) 5 MG tablet Take 10 mg by mouth as needed   Yes Historical Provider, MD   HYDROcodone-acetaminophen (NORCO) 5-325 MG per tablet Take 1 tablet by mouth every 6 hours as needed for Pain. Historical Provider, MD   methylPREDNISolone (MEDROL) 4 MG tablet Take 2 mg by mouth daily    Historical Provider, MD   lanthanum (FOSRENOL) 1000 MG chewable tablet CHEW AND SWALLOW 1 TABLET THREE TIMES A DAY WITH MEALS 7/15/19   Historical Provider, MD   metoprolol succinate (TOPROL XL) 25 MG extended release tablet TAKE ONE TABLET BY MOUTH EVERY DAY 7/3/19   Historical Provider, MD   oxyCODONE-acetaminophen (PERCOCET) 5-325 MG per tablet TAKE ONE TABLET BY MOUTH EVERY 4 HOURS AS NEEDED 8/12/19   Historical Provider, MD   B Complex-C-Folic Acid (LOLA-GRACE) TABS TAKE 1 TABLET BY MOUTH EVERY EVENING 6/5/19   Historical Provider, MD       Allergies:  Spironolactone    Social History:   reports that he has never smoked. He has never used smokeless tobacco. He reports that he does not drink alcohol or use drugs. Family History: family history is not on file. He was adopted.      REVIEW OF SYSTEMS:      · Constitutional: fever · Eyes: No visual changes or diplopia. · ENT: No Headaches  · Cardiovascular:  Remaining as above  · Respiratory: No cough  · Gastrointestinal: No abdominal pain. No change in bowel or bladder habits. · Genitourinary: No dysuria, trouble voiding, or hematuria. · Musculoskeletal: No joint complaints. · Neurological: No headache  · Hematologic/Lymphatic: No abnormal bruising or bleeding      PHYSICAL EXAM:      /72   Pulse 65   Temp 99 °F (37.2 °C) (Oral)   Resp 17   Ht 6' 1\" (1.854 m)   Wt 266 lb 5.1 oz (120.8 kg)   SpO2 97%   BMI 35.14 kg/m²      Intake/Output Summary (Last 24 hours) at 10/21/2020 0855  Last data filed at 10/21/2020 4022  Gross per 24 hour   Intake 490 ml   Output 2330 ml   Net -1840 ml         Constitutional and General Appearance:    Alert, cooperative, no distress and appears stated age  Respiratory:  · No for increased work of breathing. · On auscultation: Decreased right-sided breath sounds as compared to left  Cardiovascular:  · Regular S1 and S2.   · No murmurs  Abdomen:   · No masses or tenderness  · Bowel sounds present  Extremities:  ·  No Cyanosis or Clubbing  ·  Lower extremity edema: No  Neurological:  · Alert and oriented. · Moves all extremities well    DATA:    Diagnostics:    EKG:   Normal sinus rhythm  Nonspecific ST and T wave abnormality  Abnormal ECG  When compared with ECG of 25-MAY-2015 08:48,  T wave inversion now evident in Anterior leads  ECHO:    Left ventricle is enlarged. Global left ventricular systolic function is  moderately reduced. Estimated ejection fraction is 35 % . Grade I (mild) left ventricular diastolic dysfunction. There is global hypokinesis worse in the basal segments of the LV, with less  hypokinesis in the apical segments of LV. Mild left ventricular hypertrophy. Pacemaker / ICD lead seen in right atrium and right ventricle. Mild mitral regurgitation. Trivial to mild tricuspid regurgitation.  Estimated right ventricular  systolic pressure is 23 mmHg. Stress Test:   None in chart  Cardiac Angiography:   None in chart    Labs:     CBC:   Recent Labs     10/20/20  0831 10/21/20  0625   WBC 7.1 5.8   HGB 9.5* 8.9*   HCT 31.3* 27.8*    222     BMP:   Recent Labs     10/20/20  0831 10/21/20  0625    135   K 4.3 4.4   CO2 20 22   BUN 46* 29*   CREATININE 13.71* 9.98*   LABGLOM 4* 5*   GLUCOSE 85 76     Pro-BNP:  No results for input(s): PROBNP in the last 72 hours. BNP: No results for input(s): BNP in the last 72 hours. PT/INR: No results for input(s): PROTIME, INR in the last 72 hours. APTT:No results for input(s): APTT in the last 72 hours. CARDIAC ENZYMES:No results for input(s): CKTOTAL, CKMB, CKMBINDEX, TROPONINI in the last 72 hours. Invalid input(s):  TROPONINT  No results for input(s): TROPONINT in the last 72 hours. FASTING LIPID PANEL:  Lab Results   Component Value Date    HDL 29 02/02/2014    TRIG 230 04/11/2015     LIVER PROFILE:  Recent Labs     10/21/20  0625   AST 19   ALT 10   LABALBU 3.5         Patient's Active Problem List  Principal Problem:    Streptococcal septicemia ([facility]  Active Problems:    Biventricular CHF (congestive heart failure) ([facility]) with icd chronic     A-fib ([facility])    ESRD (end stage renal disease) on dialysis ([facility])    Anemia of chronic disease    Fever of unknown origin    CAD (coronary artery disease)  Resolved Problems:    * No resolved hospital problems. *        IMPRESSION:    1. Type II NSTEMI. No chest pain  2. Streptococcal group B septicemia  3. Nonischemic cardiomyopathy s/p AICD  4. Atrial fibrillation-rate and rhythm controlled on Eliquis  5. Hypertension  6. Diabetes mellitus  7. ESRD on HD    RECOMMENDATIONS:  1. Elevated troponins secondary to ESRD - no further work-up needed  2. Confirmed with ID and they don't want KEIKO  3. Patient is Not in fluid overload state - no need of diuretics at this time  4.  Continue aspirin, Eliquis, Lopressor and Cozaar  5. Patient follows with his cardiologist at [facility] to be discussed with Dr. Angelo Epstein    Thank you for allowing us to participate in the care of [facility]. If you have any questions or concerns, please do not hesitate to contact us. Discussed with patient and Nurse. Harini Ayoub M.D. Resident, [address] [place] [address]        Please note that part of this chart were generated using voice recognition  dictation software. Although every effort was made to ensure the accuracy of this automated transcription, some errors in transcription may have occurred. Attestation signed by      Attending Physician Statement:    I have discussed the care of  [facility] , including pertinent history and exam findings, with the Cardiology fellow/resident. I have seen and examined the patient and the key elements of all parts of the encounter have been performed by me. I agree with the assessment, plan and orders as documented by the fellow/resident, after I modified exam findings and plan of treatments, and the final version is my approved version of the assessment. Additional Comments:   NSTEMI type 2- elevated troponins- due to ESRD on HD, no trend to suggest ongoing ischemia/injury  Bacteremia  Fevers  Known NICM- s/p AICD- details unclear. On BB/ARB. ESRD on HD  Parox AF- in NSR on Eliquis  - reviewed Echo this admission - EF 35%  - no further inpatient cardiac workup planned, can follow up with primary cardiologist in 2-4 weeks  - we discuss with ID- no need for KEIKO at this point- as they believe source is not likely IE or his AICD    Discussed with patient and nursing. Thank you for allowing me to participate in the care of this patient, please do not hesitate to call if you have any questions. Janna Zeng DO, [facility], [address] [facility]  [email or web address]  [phone]

## 2020-10-21 NOTE — PLAN OF CARE
Problem: Pain:  Goal: Pain level will decrease  Description: Pain level will decrease  10/21/2020 1702 by Tresa Vaughn RN  Outcome: Completed  10/21/2020 0518 by Angelita Jimenez RN  Outcome: Ongoing  Goal: Control of acute pain  Description: Control of acute pain  Outcome: Completed  Goal: Control of chronic pain  Description: Control of chronic pain  Outcome: Completed     Problem: Falls - Risk of:  Goal: Will remain free from falls  Description: Will remain free from falls  10/21/2020 1702 by Tresa Vaughn RN  Outcome: Completed  10/21/2020 0518 by Angelita Jimenez RN  Outcome: Met This Shift  Note: Patient's bed remained locked and in lowest position throughout shift. Patient belonings and call light remain within reach. 2/4 side rails are up, and non-skid footwear is on.      Goal: Absence of physical injury  Description: Absence of physical injury  Outcome: Completed

## 2020-10-21 NOTE — CARE COORDINATION
Transitional Planning    Called hemodilysis clinic, [facility], on [address] [phone] to verify antibiotics can be given post HD as outpatient. Spoke with Mile, she verified they have the antibiotic, she needs script for antibiotic faxed to her at [phone]. PS to Dr Malick Eli requesting script. 5713 prescription for cefazolin faxed to  at [facility] confirms receipt of prescription.     Discharge 1 [facility] Case Management Department  Written by: Stiven Vargas RN    Patient Name: Tomas Morgan  Attending Provider: Felicia Swanson MD  Admit Date: 10/17/2020  9:13 PM  MRN: [id number]  Account: [de-identified]                     : 1960  Discharge Date: 10/21/2020      Disposition: home    Stiven Vargas RN

## 2020-10-21 NOTE — PROGRESS NOTES
Primary notified this AM regarding patients critical creatinine of 9.98. Primary asked RN to notify Nephro, spoke with Dr. Olman Buckner. No plan for any extra dialysis tx today.  Continue dialysis as scheduled planned Tues, Thurs, Sat.

## 2020-10-21 NOTE — PROGRESS NOTES
[facility]  Office: [address], DO, Mandy Hearn, DO, Shankar Lalito, DO, Emilia Mcnamarajake Callejas, DO, Rach Montano MD, Ned Grullon MD, Li Hurst MD, Fly Wolfe MD, Ludwig Philip MD, Beryle Marseille, MD, Ciro Pallas, MD, Juan F Maradiaga MD, Prabhjot Palacio MD, Donovan Mcintyre, DO, Edwina Melvin MD, Jennifer Rock MD, Reny Kumar DO, Cory Zamora MD,  Narciso Willett DO, Anaid Cosby MD, Octavia Cortes MD, Kate Swanson, [facility], [facility], [facility], Omer George CNP, Daryn Kathleen, CNS, Jamie Hearn, CNP, Meg Snyder, CNP, Pauline Bartholomew, CNP, Yariel Wills, CNP, Spencer Vela CNP, Rafael Granger PA-C, Mortimer Guardian, [facility], Mendel Condon, CNP, Lavonne Frankel, CNP, Irma Reyna, CNP, Minoo Bautista, CNP, Yosvany Hopson, [facility]   [address] [facility]    Progress Note    10/21/2020    9:21 AM    Name:   Yasmine Lennon  MRN:     [id number]     Acct:      [de-identified]   Room:   [address] [place] [address] Day:  4  Admit Date:  10/17/2020  9:13 PM    PCP:   No primary care provider on file. Code Status:  Full Code    Subjective:     C/C: Bacteremia     Interval History Status: improved.      Pt was seen and examined this morning  No new complaints at this time   Was hoping to be discharged today but is understanding of the need to stay for further work up         Review of Systems:      12 point ROS performed today and negative for anything other than what was stated in subjective       Medications: Allergies:     Allergies   Allergen Reactions    Spironolactone Itching and Other (See Comments)       Current Meds:   Scheduled Meds:    cinacalcet  60 mg Oral Once per day on Tue Thu Sat    ceFAZolin  3 g Intravenous Once per day on Tue Thu Sat    midodrine  10 mg Oral TID WC    aspirin  81 mg Oral Daily    b complex-C-folic acid  1 mg Oral Daily    apixaban  5 mg Oral BID    methocarbamol  750 mg Oral TID    sodium chloride flush  10 mL Intravenous 2 times per day     Continuous Infusions:   PRN Meds: midodrine, sodium chloride flush, acetaminophen **OR** acetaminophen, polyethylene glycol, promethazine **OR** ondansetron, nicotine    Data:     Past Medical History:   has a past medical history of A-V fistula ([facility] AICD (automatic cardioverter/defibrillator) present, Asthma, Atrial fibrillation ([facility] Blood transfusion reaction, CAD (coronary artery disease), CHF (congestive heart failure) ([facility] CHF (congestive heart failure) ([facility] COPD (chronic obstructive pulmonary disease) ([facility] CRF (chronic renal failure), Diabetes mellitus ([facility] Dialysis patient ([facility] GERD (gastroesophageal reflux disease), Hemodialysis patient ([facility] Hypertension, and Obesity. Social History:   reports that he has never smoked. He has never used smokeless tobacco. He reports that he does not drink alcohol or use drugs. Family History:   Family History   Adopted: Yes       Vitals:  /72   Pulse 65   Temp 99 °F (37.2 °C) (Oral)   Resp 17   Ht 6' 1\" (1.854 m)   Wt 266 lb 5.1 oz (120.8 kg)   SpO2 97%   BMI 35.14 kg/m²   Temp (24hrs), Av.1 °F (36.7 °C), Min:97.5 °F (36.4 °C), Max:99 °F (37.2 °C)    No results for input(s): POCGLU in the last 72 hours. I/O (24Hr):     Intake/Output Summary (Last 24 hours) at 10/21/2020 0921  Last data filed at 10/21/2020 5355  Gross per 24 hour   Intake 490 ml   Output 2330 ml   Net -1840 ml       Labs:  Hematology:  Recent Labs     10/20/20  0831 10/21/20  0625   WBC 7.1 5.8   RBC 3.18* 2.86*   HGB 9.5* 8.9*   HCT 31.3* 27.8*   MCV 98.4 97.2   MCH 29.9 31.1   MCHC 30.4 32.0   RDW 14.4 14.6*    222   MPV 12.2 11.9     Chemistry:  Recent Labs     10/20/20  0831 10/21/20  06    135   K 4.3 4.4   CL 91* 96*   CO2 20 22   GLUCOSE 85 76   BUN 46* 29*   CREATININE 13.71* 9.98*   ANIONGAP 25* 17   LABGLOM 4* 5*   GFRAA 5* 7*   CALCIUM 8.7 8.3*     Recent Labs     10/21/20  0625   PROT 7.5 LABALBU 3.5   AST 19   ALT 10   ALKPHOS 65   BILITOT 0.30     ABG:  Lab Results   Component Value Date    POCPH 7.43 05/22/2015    POCPCO2 42 05/22/2015    POCPO2 110 05/22/2015    POCHCO3 27.8 05/22/2015    NBEA NOT REPORTED 05/22/2015    PBEA 3 05/22/2015    YAS2YMP 29 05/22/2015    APEB8JIK 98 05/22/2015    FIO2 30.0 05/22/2015     Lab Results   Component Value Date/Time    SPECIAL LTAC,10CC 10/17/2020 11:00 AM    SPECIAL RT HAND,8CC 10/17/2020 11:00 AM     Lab Results   Component Value Date/Time    CULTURE (A) 10/17/2020 11:00 AM     POSITIVE Blood Culture Results called to and read back by: HODAN Balderrama Seen B  ON 10/18/20    CULTURE  10/17/2020 11:00 AM     DIRECT GRAM STAIN FROM BOTTLE: GRAM POSITIVE COCCI IN CHAINS    CULTURE Streptococcus agalactiae (Group B) detected by PCR (A) 10/17/2020 11:00 AM    CULTURE STREPTOCOCCUS AGALACTIAE (GROUP B) (A) 10/17/2020 11:00 AM    CULTURE (A) 10/17/2020 11:00 AM     POSITIVE Blood Culture Results called to and read back by: HODAN Balderrama Seen B AT [id number] ON 10/18/20    CULTURE  10/17/2020 11:00 AM     DIRECT GRAM STAIN FROM BOTTLE: GRAM POSITIVE COCCI IN CHAINS    CULTURE (A) 10/17/2020 11:00 AM     STREPTOCOCCUS AGALACTIAE (GROUP B) For susceptibility, refer to previous culture. Radiology:  Xr Chest Portable    Result Date: 10/17/2020  Interval removal of right-sided double lumen catheter. Chronic elevation the right hemidiaphragm. No acute cardiopulmonary process.        Physical Examination:        General appearance:  alert, cooperative and no distress  Mental Status:  oriented to person, place and time and normal affect  Lungs:  clear to auscultation bilaterally, normal effort  Heart:  regular rate and rhythm, no murmur  Abdomen:  soft, nontender, nondistended, normal bowel sounds, no masses, hepatomegaly, splenomegaly  Extremities:  no edema, redness, tenderness in the calves  Skin:  no gross lesions, rashes, induration    Assessment:        Hospital Problems Last Modified POA    * (Principal) Streptococcal septicemia ([facility] 10/18/2020 Yes    Biventricular CHF (congestive heart failure) ([facility] with icd chronic  10/18/2020 Yes    A-fib ([facility] 10/18/2020 Yes    Overview Signed 2/3/2014  3:55 PM by Pascale Spears V,      Chads 2 score. On ASA. Pt has refused coumadin therapy in the past.          ESRD (end stage renal disease) on dialysis ([facility] 10/18/2020 Yes    Anemia of chronic disease 10/18/2020 Yes    Fever of unknown origin 10/18/2020 Yes    CAD (coronary artery disease) 10/18/2020 Yes    Overview Signed 10/18/2020  8:28 AM by MD dr Kavon Ignacio cardiologist recently aicd check /clearance 4/15               Plan:        1. Streptococcal septicemia   - positive 2/2 blood cultures   - source remains unclear - possible origin is bowel   - ID on board   - IV antibiotics changed today to cefazolin 3 g post dialysis. Continue through 10/30. Will need repeat blood cultures post treatment on 11/7/20  - TTE completed, will likely need KEIKO given that patient has an ICD in place, will f/u ID recs   - currently afebrile   - KEIKO ordered   - wbc count normal       2. ESRD  - mgmt per nephrology   - HD on Tues/Thurs/Sat)  - BUN/Cr elevated at 29/9.98  - completed dialysis treatment yesterday with net removal of 2020 ml      3. Anemia of chronic disease   - at baseline   - IV aranesp with dialysis      4. Chronic Afib   - rate controled   - eliquis for anti coagulation      5. HTN   - v/s per unit protocol   - given midodrine due to episode of hypotension   - home anti hypertensive medications were held due to patient being hypotensive on 10/18  - will begin to restart his home anti hypertensive and monitor patient closely to see how he does      6. CAD   7. Elevated troponin   8.  Type II NSTEMI  - pt denies any chest pain/pressure   - troponin trended down last check   - likely related to ESRD   - continue home asa   - Cardiology consulted for further evaluation    9. CHF   - TTE completed, showed LVEF 35% with grade 1 LV diastolic dysfunction. Not much different from ECHO in 2015  - not on any diuretics   - cardiology consulted     10.  DVT prophylaxis   - lashell Welch MD  10/21/2020  9:21 AM

## 2020-10-21 NOTE — PROGRESS NOTES
Renal Progress Note    Patient :  Ole Pisano; 63 y.o. MRN# [id number]  Location:  3116/4676-23  Attending:  Stan Thompson MD  Admit Date:  10/17/2020   Hospital Day: 4      Subjective:     Patient was seen and examined. No new issues reported overnight. On IV antibiotics as per infectious diseases. Outpatient Medications:     Medications Prior to Admission: doxycycline hyclate (VIBRAMYCIN) 100 MG capsule, Take 100 mg by mouth 2 times daily  methocarbamol (ROBAXIN) 750 MG tablet, Take 750 mg by mouth 3 times daily  ELIQUIS 5 MG TABS tablet, TAKE ONE TABLET BY MOUTH TWICE DAILY  losartan (COZAAR) 25 MG tablet, TAKE ONE TABLET BY MOUTH EVERY DAY  aspirin 81 MG chewable tablet, Take 81 mg by mouth daily  midodrine (PROAMATINE) 5 MG tablet, Take 10 mg by mouth as needed  HYDROcodone-acetaminophen (NORCO) 5-325 MG per tablet, Take 1 tablet by mouth every 6 hours as needed for Pain.   methylPREDNISolone (MEDROL) 4 MG tablet, Take 2 mg by mouth daily  lanthanum (FOSRENOL) 1000 MG chewable tablet, CHEW AND SWALLOW 1 TABLET THREE TIMES A DAY WITH MEALS  metoprolol succinate (TOPROL XL) 25 MG extended release tablet, TAKE ONE TABLET BY MOUTH EVERY DAY  oxyCODONE-acetaminophen (PERCOCET) 5-325 MG per tablet, TAKE ONE TABLET BY MOUTH EVERY 4 HOURS AS NEEDED  B Complex-C-Folic Acid (LOLA-GRACE) TABS, TAKE 1 TABLET BY MOUTH EVERY EVENING    Current Medications:     Scheduled Meds:    cinacalcet  60 mg Oral Once per day on Tue Thu Sat    ceFAZolin  3 g Intravenous Once per day on Tue Thu Sat    midodrine  10 mg Oral TID WC    aspirin  81 mg Oral Daily    b complex-C-folic acid  1 mg Oral Daily    apixaban  5 mg Oral BID    methocarbamol  750 mg Oral TID    sodium chloride flush  10 mL Intravenous 2 times per day     Continuous Infusions:   PRN Meds:  midodrine, sodium chloride flush, acetaminophen **OR** acetaminophen, polyethylene glycol, promethazine **OR** ondansetron, nicotine    Input/Output:       I/O last 3 completed shifts: In: 56 [P.O.:180]  Out: 2330 . Patient Vitals for the past 96 hrs (Last 3 readings):   Weight   10/20/20 0830 266 lb 5.1 oz (120.8 kg)   10/20/20 0600 270 lb 15.1 oz (122.9 kg)   10/19/20 0555 268 lb 4.8 oz (121.7 kg)       Vital Signs:   Temperature:  Temp: 99 °F (37.2 °C)  TMax:   Temp (24hrs), Av.1 °F (36.7 °C), Min:97.5 °F (36.4 °C), Max:99 °F (37.2 °C)    Respirations:  Resp: 17  Pulse:   Pulse: 65  BP:    BP: 133/72  BP Range: Systolic (42PUN), JQK:301 , Min:114 , VXT:465       Diastolic (80DLJ), EGH:13, Min:67, Max:86      Physical Examination:     General:  AAO x 3, speaking in full sentences, no accessory muscle use. HEENT: Atraumatic, normocephalic, no throat congestion, moist mucosa. Eyes:   Pupils equal, round and reactive to light, EOMI. Neck:   Supple  Chest:   Bilateral vesicular breath sounds, no rales or wheezes. Cardiac:  S1 S2 RR, no murmurs, gallops or rubs. Abdomen: Soft, non-tender, no masses or organomegaly, BS audible. :   No suprapubic or flank tenderness. Neuro:  AAO x 3, No FND. SKIN:  No rashes, good skin turgor. Extremities:  No edema. Labs:       Recent Labs     10/20/20  0831 10/21/20  0625   WBC 7.1 5.8   RBC 3.18* 2.86*   HGB 9.5* 8.9*   HCT 31.3* 27.8*   MCV 98.4 97.2   MCH 29.9 31.1   MCHC 30.4 32.0   RDW 14.4 14.6*    222   MPV 12.2 11.9      BMP:   Recent Labs     10/20/20  0831 10/21/20  0625    135   K 4.3 4.4   CL 91* 96*   CO2 20 22   BUN 46* 29*   CREATININE 13.71* 9.98*   GLUCOSE 85 76   CALCIUM 8.7 8.3*      Albumin:    Recent Labs     10/21/20  0625   LABALBU 3.5     BNP:      Lab Results   Component Value Date    BNP NOT REPORTED 2014     SPEP:  Lab Results   Component Value Date    PROT 7.5 10/21/2020     Radiology:     Reviewed. Assessment:     1. ESRD on HD on TTS schedule at [facility] under Dr. Giacomo Fleischer via right arm AV fistula. 2.  Streptococcal group B septicemia on IV antibiotics per ID.   3.  Anemia of chronic disease. 4.  Coronary artery disease status post stent. 5.  Diabetes type 2.  6.  Essential hypertension. 7.  A. fib. 8.  Ischemic cardiomyopathy status post AICD. Plan:   1. No acute need for dialysis today. Will get regular dialysis in a.m. as per TTS schedule. 2.  Antibiotics as per infectious diseases. 3.  BMP in a.m.  4.  Will follow. Nutrition   Please ensure that patient is on a renal diet/TF. Avoid nephrotoxic drugs/contrast exposure. We will continue to follow along with you. Nishant K. Dellis Klinefelter, MD  [facility]     This note is created with the assistance of a speech-recognition program. While intending to generate a document that actually reflects the content of the visit, no guarantees can be provided that every mistake has been identified and corrected by editing.

## 2020-10-21 NOTE — DISCHARGE SUMMARY
[facility]  Office: [address], DO, Delicia Bailona, DO, Lily Surendracassidy, DO, Olimpia Oviedo Blood, DO, Angela Barr MD, Yves Arguelles MD, Darryle So, MD, Manjula Watts MD, Jan Robertson MD, Jose M Bryan MD, Nixon Gibbs MD, María Sims MD, Prabhjot Austin MD, Betty Marcos, DO, Dorian Romero MD, Megan Felder MD, Gerri Garza DO, Addy Lama MD,  Gracy Boogie DO, Aidan Tariq MD, Cally Sofia MD, Clemencia Yarbrough, [facility], [facility], CNP, Edgardo Hernández, CNP, Faustino Martinez, CNS, Geovanny Henderson, CNP, Gerson Reddy, CNP, Ashish Cabrera, CNP, Raul High, CNP, Ade Hubbard, CNP, Consuelo Oglesby PA-C, Janay Avery, [facility], Miah Roger, CNP, Rizwan Vasquez, CNP, Naveen Moser, CNP, Tawana Rubinstein, CNP, Donavan Frazier, [facility]   [address]    Discharge Summary     Patient ID: Tomasa Odom  :  1960   MRN: [id number]     ACCOUNT:  [de-identified]   Patient's PCP: No primary care provider on file. Admit Date: 10/17/2020   Discharge Date: 10/21/2020      Length of Stay: 4  Code Status:  Full Code  Admitting Physician: Aidan Tariq MD  Discharge Physician: Aidan Tariq MD     Active Discharge Diagnoses:     Hospital Problem Lists:  Principal Problem:    Streptococcal septicemia ([facility]  Active Problems:    Biventricular CHF (congestive heart failure) ([facility] with icd chronic     A-fib ([facility]    ESRD (end stage renal disease) on dialysis ([facility]    Anemia of chronic disease    Fever of unknown origin    CAD (coronary artery disease)  Resolved Problems:    * No resolved hospital problems. *      Admission Condition:  stable     Discharged Condition: stable    Hospital Stay:     Hospital Course: Tomasa Odom is a 63 y.o. male who was admitted for the management of    Streptococcal septicemia ([facility] , presented to ER with   No chief complaint on file.     1. Streptococcal septicemia   - positive 2/2 blood cultures   - source remains unclear - possible origin is bowel   - ID on board   - IV antibiotics changed today to cefazolin 3 g post dialysis. Continue through 10/30. Will need repeat blood cultures post treatment on 11/7/20  - TTE completed, does not need KEIKO per ID   - cleared for d/c by cardiology and ID   - wbc count normal       2. ESRD  - mgmt per nephrology   - HD on Tues/Thurs/Sat)  - BUN/Cr elevated at 29/9.98  - completed dialysis treatment yesterday with net removal of 2020 ml      3. Anemia of chronic disease   - at baseline   - IV aranesp with dialysis      4. Chronic Afib   - rate controled   - eliquis for anti coagulation      5. HTN   - given midodrine due to episode of hypotension   - home anti hypertensive medications were held due to patient being hypotensive on 10/18  - will begin to restart his home anti hypertensive and monitor patient closely to see how he does      6. CAD   7. Elevated troponin   8. Type II NSTEMI  - pt denies any chest pain/pressure   - troponin trended down last check   - likely related to ESRD   - continue home asa   - Cardiology consulted for further evaluation - recommend no further work up      9. CHF   - TTE completed, showed LVEF 35% with grade 1 LV diastolic dysfunction.  Not much different from ECHO in 2015  - not on any diuretics   - cardiology consulted       Significant therapeutic interventions:      Significant Diagnostic Studies:   Labs / Micro:  CBC:   Lab Results   Component Value Date    WBC 5.8 10/21/2020    RBC 2.86 10/21/2020    HGB 8.9 10/21/2020    HCT 27.8 10/21/2020    MCV 97.2 10/21/2020    MCH 31.1 10/21/2020    MCHC 32.0 10/21/2020    RDW 14.6 10/21/2020     10/21/2020     BMP:    Lab Results   Component Value Date    GLUCOSE 76 10/21/2020     10/21/2020    K 4.4 10/21/2020    CL 96 10/21/2020    CO2 22 10/21/2020    ANIONGAP 17 10/21/2020    BUN 29 10/21/2020    CREATININE 9.98 10/21/2020    BUNCRER NOT REPORTED 10/21/2020    CALCIUM 8.3 10/21/2020    LABGLOM 5 10/21/2020    GFRAA 7 10/21/2020    GFR      10/21/2020    GFR NOT REPORTED 10/21/2020     HFP:    Lab Results   Component Value Date    PROT 7.5 10/21/2020     CMP:    Lab Results   Component Value Date    GLUCOSE 76 10/21/2020     10/21/2020    K 4.4 10/21/2020    CL 96 10/21/2020    CO2 22 10/21/2020    BUN 29 10/21/2020    CREATININE 9.98 10/21/2020    ANIONGAP 17 10/21/2020    ALKPHOS 65 10/21/2020    ALT 10 10/21/2020    AST 19 10/21/2020    BILITOT 0.30 10/21/2020    LABALBU 3.5 10/21/2020    ALBUMIN 0.9 10/21/2020    LABGLOM 5 10/21/2020    GFRAA 7 10/21/2020    GFR      10/21/2020    GFR NOT REPORTED 10/21/2020    PROT 7.5 10/21/2020    CALCIUM 8.3 10/21/2020     PT/INR:    Lab Results   Component Value Date    PROTIME 11.3 10/18/2020    PROTIME 36.2 05/18/2015    INR 1.1 10/18/2020     PTT:   Lab Results   Component Value Date    APTT 26.4 09/21/2019     FLP:    Lab Results   Component Value Date    CHOL 151 02/02/2014    TRIG 230 04/11/2015    HDL 29 02/02/2014     U/A:  No results found for: COLORU, TURBIDITY, SPECGRAV, HGBUR, PHUR, PROTEINU, GLUCOSEU, KETUA, BILIRUBINUR, UROBILINOGEN, NITRU, LEUKOCYTESUR  TSH:    Lab Results   Component Value Date    TSH 1.11 01/30/2014         Radiology:  Xr Chest Portable    Result Date: 10/17/2020  Interval removal of right-sided double lumen catheter. Chronic elevation the right hemidiaphragm. No acute cardiopulmonary process. Consultations:    Consults:     Final Specialist Recommendations/Findings:   IP CONSULT TO INFECTIOUS DISEASES  IP CONSULT TO NEPHROLOGY  IP CONSULT TO CARDIOLOGY  IP CONSULT TO CASE MANAGEMENT      The patient was seen and examined on day of discharge and this discharge summary is in conjunction with any daily progress note from day of discharge.     Discharge plan:     Disposition: Home    Physician Follow Up:    follow up with nephrolgoy   Follow up with pcp within one week   Follow up with ID Requiring Further Evaluation/Follow Up POST HOSPITALIZATION/Incidental Findings:      Diet: cardiac diet and renal diet    Activity: As tolerated     Instructions to Patient:      Discharge Medications:      Medication List      START taking these medications    ceFAZolin 1 g injection  Commonly known as:  ANCEF  3 gm with each dialysis session. End date of 10/30/20     cinacalcet 60 MG tablet  Commonly known as:  SENSIPAR  60 mg every Tuesday Thursday and Saturday with dialysis        CONTINUE taking these medications    aspirin 81 MG chewable tablet     Eliquis 5 MG Tabs tablet  Generic drug:  apixaban     lanthanum 1000 MG chewable tablet  Commonly known as:  FOSRENOL     methocarbamol 750 MG tablet  Commonly known as:  ROBAXIN     metoprolol succinate 25 MG extended release tablet  Commonly known as:  TOPROL XL     midodrine 5 MG tablet  Commonly known as:  PROAMATINE     chrissie-pito Tabs        STOP taking these medications    doxycycline hyclate 100 MG capsule  Commonly known as:  VIBRAMYCIN     HYDROcodone-acetaminophen 5-325 MG per tablet  Commonly known as:  NORCO     losartan 25 MG tablet  Commonly known as:  COZAAR     methylPREDNISolone 4 MG tablet  Commonly known as:  MEDROL     oxyCODONE-acetaminophen 5-325 MG per tablet  Commonly known as:  PERCOCET           Where to Get Your Medications      These medications were sent to [facility] [address] [facility], [address] [place] [address]  [address] [facility], [address]  [address]    Phone:  [phone]   · cinacalcet 60 MG tablet     You can get these medications from any pharmacy    Bring a paper prescription for each of these medications  · ceFAZolin 1 g injection         No discharge procedures on file. Time Spent on discharge is  37 mins in patient examination, evaluation, counseling as well as medication reconciliation, prescriptions for required medications, discharge plan and follow up.     Electronically signed by Manuel Farah MD  10/21/2020  4:48 PM      Thank you Dr. Katrinka Frankel primary care provider on file. for the opportunity to be involved in this patient's care.

## 2020-10-21 NOTE — PLAN OF CARE
Problem: Falls - Risk of:  Goal: Will remain free from falls  Description: Will remain free from falls  Outcome: Met This Shift  Note: Patient's bed remained locked and in lowest position throughout shift. Patient belonings and call light remain within reach. 2/4 side rails are up, and non-skid footwear is on. Problem: Pain:  Description: Pain management should include both nonpharmacologic and pharmacologic interventions.   Goal: Pain level will decrease  Description: Pain level will decrease  Outcome: Ongoing   Electronically signed by Indio Palacios RN on 10/21/2020 at 5:18 AM

## 2022-02-26 ENCOUNTER — APPOINTMENT (OUTPATIENT)
Dept: GENERAL RADIOLOGY | Age: 62
DRG: 314 | End: 2022-02-26
Payer: COMMERCIAL

## 2022-02-26 ENCOUNTER — HOSPITAL ENCOUNTER (INPATIENT)
Age: 62
LOS: 6 days | Discharge: HOME | DRG: 314 | End: 2022-03-04
Attending: EMERGENCY MEDICINE | Admitting: INTERNAL MEDICINE
Payer: COMMERCIAL

## 2022-02-26 ENCOUNTER — APPOINTMENT (OUTPATIENT)
Dept: CT IMAGING | Age: 62
DRG: 314 | End: 2022-02-26
Payer: COMMERCIAL

## 2022-02-26 DIAGNOSIS — R06.02 SHORTNESS OF BREATH: Primary | ICD-10-CM

## 2022-02-26 DIAGNOSIS — A41.9 SEPTICEMIA (HCC): ICD-10-CM

## 2022-02-26 DIAGNOSIS — R94.31 PROLONGED Q-T INTERVAL ON ECG: ICD-10-CM

## 2022-02-26 LAB
ABSOLUTE EOS #: 0.03 K/UL (ref 0–0.44)
ABSOLUTE IMMATURE GRANULOCYTE: 0.07 K/UL (ref 0–0.3)
ABSOLUTE LYMPH #: 1.06 K/UL (ref 1.1–3.7)
ABSOLUTE MONO #: 0.64 K/UL (ref 0.1–1.2)
ALBUMIN SERPL-MCNC: 4.1 G/DL (ref 3.5–5.2)
ALP BLD-CCNC: 110 U/L (ref 40–129)
ALT SERPL-CCNC: 13 U/L (ref 5–41)
ANION GAP SERPL CALCULATED.3IONS-SCNC: 16 MMOL/L (ref 9–17)
AST SERPL-CCNC: 16 U/L
BASOPHILS # BLD: 0 % (ref 0–2)
BASOPHILS ABSOLUTE: 0.03 K/UL (ref 0–0.2)
BILIRUB SERPL-MCNC: 0.77 MG/DL (ref 0.3–1.2)
BUN BLDV-MCNC: 25 MG/DL (ref 8–23)
BUN/CREAT BLD: 3 (ref 9–20)
CALCIUM SERPL-MCNC: 9.5 MG/DL (ref 8.6–10.4)
CHLORIDE BLD-SCNC: 90 MMOL/L (ref 98–107)
CO2: 27 MMOL/L (ref 20–31)
CREAT SERPL-MCNC: 7.91 MG/DL (ref 0.7–1.2)
EOSINOPHILS RELATIVE PERCENT: 0 % (ref 1–4)
FIBRINOGEN: 477 MG/DL (ref 179–518)
GFR AFRICAN AMERICAN: 8 ML/MIN
GFR NON-AFRICAN AMERICAN: 7 ML/MIN
GFR SERPL CREATININE-BSD FRML MDRD: ABNORMAL ML/MIN/{1.73_M2}
GLUCOSE BLD-MCNC: 98 MG/DL (ref 70–99)
HCT VFR BLD CALC: 30.8 % (ref 40.7–50.3)
HEMOGLOBIN: 10 G/DL (ref 13–17)
IMMATURE GRANULOCYTES: 1 %
INR BLD: 1.2
LACTATE DEHYDROGENASE: 189 U/L (ref 135–225)
LACTIC ACID, SEPSIS: 1.8 MMOL/L (ref 0.5–1.9)
LACTIC ACID, SEPSIS: 1.9 MMOL/L (ref 0.5–1.9)
LACTIC ACID, SEPSIS: 1.9 MMOL/L (ref 0.5–1.9)
LACTIC ACID, SEPSIS: 2.7 MMOL/L (ref 0.5–1.9)
LYMPHOCYTES # BLD: 7 % (ref 24–43)
MCH RBC QN AUTO: 30.9 PG (ref 25.2–33.5)
MCHC RBC AUTO-ENTMCNC: 32.5 G/DL (ref 28.4–34.8)
MCV RBC AUTO: 95.1 FL (ref 82.6–102.9)
MONOCYTES # BLD: 4 % (ref 3–12)
NRBC AUTOMATED: 0 PER 100 WBC
PARTIAL THROMBOPLASTIN TIME: >150 SEC (ref 23.9–33.8)
PDW BLD-RTO: 13.9 % (ref 11.8–14.4)
PLATELET # BLD: 133 K/UL (ref 138–453)
PMV BLD AUTO: 11.7 FL (ref 8.1–13.5)
POTASSIUM SERPL-SCNC: 4.5 MMOL/L (ref 3.7–5.3)
PRO-BNP: 2319 PG/ML
PROTHROMBIN TIME: 15 SEC (ref 11.5–14.2)
RBC # BLD: 3.24 M/UL (ref 4.21–5.77)
SARS-COV-2, RAPID: NOT DETECTED
SEG NEUTROPHILS: 88 % (ref 36–65)
SEGMENTED NEUTROPHILS ABSOLUTE COUNT: 13.58 K/UL (ref 1.5–8.1)
SODIUM BLD-SCNC: 133 MMOL/L (ref 135–144)
SPECIMEN DESCRIPTION: NORMAL
TOTAL PROTEIN: 8.4 G/DL (ref 6.4–8.3)
TROPONIN, HIGH SENSITIVITY: 146 NG/L (ref 0–22)
TROPONIN, HIGH SENSITIVITY: 153 NG/L (ref 0–22)
TROPONIN, HIGH SENSITIVITY: 163 NG/L (ref 0–22)
WBC # BLD: 15.4 K/UL (ref 3.5–11.3)

## 2022-02-26 PROCEDURE — 85025 COMPLETE CBC W/AUTO DIFF WBC: CPT

## 2022-02-26 PROCEDURE — 86403 PARTICLE AGGLUT ANTBDY SCRN: CPT

## 2022-02-26 PROCEDURE — 2580000003 HC RX 258: Performed by: NURSE PRACTITIONER

## 2022-02-26 PROCEDURE — 83880 ASSAY OF NATRIURETIC PEPTIDE: CPT

## 2022-02-26 PROCEDURE — 87635 SARS-COV-2 COVID-19 AMP PRB: CPT

## 2022-02-26 PROCEDURE — 83605 ASSAY OF LACTIC ACID: CPT

## 2022-02-26 PROCEDURE — 87205 SMEAR GRAM STAIN: CPT

## 2022-02-26 PROCEDURE — 71045 X-RAY EXAM CHEST 1 VIEW: CPT

## 2022-02-26 PROCEDURE — 99222 1ST HOSP IP/OBS MODERATE 55: CPT | Performed by: INTERNAL MEDICINE

## 2022-02-26 PROCEDURE — 99285 EMERGENCY DEPT VISIT HI MDM: CPT

## 2022-02-26 PROCEDURE — 6360000002 HC RX W HCPCS: Performed by: EMERGENCY MEDICINE

## 2022-02-26 PROCEDURE — 87040 BLOOD CULTURE FOR BACTERIA: CPT

## 2022-02-26 PROCEDURE — 83615 LACTATE (LD) (LDH) ENZYME: CPT

## 2022-02-26 PROCEDURE — 36415 COLL VENOUS BLD VENIPUNCTURE: CPT

## 2022-02-26 PROCEDURE — 80053 COMPREHEN METABOLIC PANEL: CPT

## 2022-02-26 PROCEDURE — 87186 SC STD MICRODIL/AGAR DIL: CPT

## 2022-02-26 PROCEDURE — 93005 ELECTROCARDIOGRAM TRACING: CPT | Performed by: EMERGENCY MEDICINE

## 2022-02-26 PROCEDURE — 85384 FIBRINOGEN ACTIVITY: CPT

## 2022-02-26 PROCEDURE — 71250 CT THORAX DX C-: CPT

## 2022-02-26 PROCEDURE — 84484 ASSAY OF TROPONIN QUANT: CPT

## 2022-02-26 PROCEDURE — 2580000003 HC RX 258: Performed by: EMERGENCY MEDICINE

## 2022-02-26 PROCEDURE — 6370000000 HC RX 637 (ALT 250 FOR IP): Performed by: NURSE PRACTITIONER

## 2022-02-26 PROCEDURE — 85730 THROMBOPLASTIN TIME PARTIAL: CPT

## 2022-02-26 PROCEDURE — 96365 THER/PROPH/DIAG IV INF INIT: CPT

## 2022-02-26 PROCEDURE — 85610 PROTHROMBIN TIME: CPT

## 2022-02-26 PROCEDURE — 96367 TX/PROPH/DG ADDL SEQ IV INF: CPT

## 2022-02-26 PROCEDURE — 70450 CT HEAD/BRAIN W/O DYE: CPT

## 2022-02-26 PROCEDURE — 96361 HYDRATE IV INFUSION ADD-ON: CPT

## 2022-02-26 PROCEDURE — 6370000000 HC RX 637 (ALT 250 FOR IP): Performed by: EMERGENCY MEDICINE

## 2022-02-26 PROCEDURE — 87150 DNA/RNA AMPLIFIED PROBE: CPT

## 2022-02-26 PROCEDURE — 2060000000 HC ICU INTERMEDIATE R&B

## 2022-02-26 RX ORDER — METOPROLOL SUCCINATE 25 MG/1
25 TABLET, EXTENDED RELEASE ORAL DAILY
Status: DISCONTINUED | OUTPATIENT
Start: 2022-02-27 | End: 2022-02-28

## 2022-02-26 RX ORDER — SODIUM CHLORIDE 0.9 % (FLUSH) 0.9 %
5-40 SYRINGE (ML) INJECTION EVERY 12 HOURS SCHEDULED
Status: DISCONTINUED | OUTPATIENT
Start: 2022-02-26 | End: 2022-03-04 | Stop reason: HOSPADM

## 2022-02-26 RX ORDER — ACETAMINOPHEN 650 MG/1
650 SUPPOSITORY RECTAL EVERY 6 HOURS PRN
Status: DISCONTINUED | OUTPATIENT
Start: 2022-02-26 | End: 2022-03-04 | Stop reason: HOSPADM

## 2022-02-26 RX ORDER — ASPIRIN 81 MG/1
81 TABLET, CHEWABLE ORAL DAILY
Status: DISCONTINUED | OUTPATIENT
Start: 2022-02-26 | End: 2022-03-04 | Stop reason: HOSPADM

## 2022-02-26 RX ORDER — SODIUM CHLORIDE 0.9 % (FLUSH) 0.9 %
5-40 SYRINGE (ML) INJECTION PRN
Status: DISCONTINUED | OUTPATIENT
Start: 2022-02-26 | End: 2022-03-04 | Stop reason: HOSPADM

## 2022-02-26 RX ORDER — CINACALCET 30 MG/1
60 TABLET, FILM COATED ORAL
Status: DISCONTINUED | OUTPATIENT
Start: 2022-02-28 | End: 2022-02-26

## 2022-02-26 RX ORDER — LANTHANUM CARBONATE 500 MG/1
1000 TABLET, CHEWABLE ORAL
Status: DISCONTINUED | OUTPATIENT
Start: 2022-02-26 | End: 2022-03-04 | Stop reason: HOSPADM

## 2022-02-26 RX ORDER — SODIUM CHLORIDE 9 MG/ML
25 INJECTION, SOLUTION INTRAVENOUS PRN
Status: DISCONTINUED | OUTPATIENT
Start: 2022-02-26 | End: 2022-03-04 | Stop reason: HOSPADM

## 2022-02-26 RX ORDER — ACETAMINOPHEN 325 MG/1
650 TABLET ORAL EVERY 6 HOURS PRN
Status: DISCONTINUED | OUTPATIENT
Start: 2022-02-26 | End: 2022-03-04 | Stop reason: HOSPADM

## 2022-02-26 RX ORDER — MAGNESIUM SULFATE IN WATER 40 MG/ML
2000 INJECTION, SOLUTION INTRAVENOUS ONCE
Status: COMPLETED | OUTPATIENT
Start: 2022-02-26 | End: 2022-02-26

## 2022-02-26 RX ORDER — ACETAMINOPHEN 500 MG
1000 TABLET ORAL ONCE
Status: COMPLETED | OUTPATIENT
Start: 2022-02-26 | End: 2022-02-26

## 2022-02-26 RX ORDER — FOLIC ACID/VIT B COMPLEX AND C 0.8 MG
1 TABLET ORAL DAILY
Status: DISCONTINUED | OUTPATIENT
Start: 2022-02-26 | End: 2022-03-04 | Stop reason: HOSPADM

## 2022-02-26 RX ORDER — 0.9 % SODIUM CHLORIDE 0.9 %
500 INTRAVENOUS SOLUTION INTRAVENOUS ONCE
Status: COMPLETED | OUTPATIENT
Start: 2022-02-26 | End: 2022-02-26

## 2022-02-26 RX ORDER — METHOCARBAMOL 750 MG/1
750 TABLET, FILM COATED ORAL 3 TIMES DAILY
Status: DISCONTINUED | OUTPATIENT
Start: 2022-02-26 | End: 2022-02-26

## 2022-02-26 RX ORDER — ACETAMINOPHEN 500 MG
1000 TABLET ORAL ONCE
Status: DISCONTINUED | OUTPATIENT
Start: 2022-02-26 | End: 2022-02-26

## 2022-02-26 RX ORDER — SODIUM CHLORIDE 9 MG/ML
INJECTION, SOLUTION INTRAVENOUS CONTINUOUS
Status: DISCONTINUED | OUTPATIENT
Start: 2022-02-26 | End: 2022-02-27

## 2022-02-26 RX ORDER — MIDODRINE HYDROCHLORIDE 10 MG/1
10 TABLET ORAL
Status: DISCONTINUED | OUTPATIENT
Start: 2022-02-26 | End: 2022-03-04 | Stop reason: HOSPADM

## 2022-02-26 RX ADMIN — ACETAMINOPHEN 1000 MG: 500 TABLET ORAL at 13:25

## 2022-02-26 RX ADMIN — MAGNESIUM SULFATE HEPTAHYDRATE 2000 MG: 40 INJECTION, SOLUTION INTRAVENOUS at 12:31

## 2022-02-26 RX ADMIN — ASPIRIN 81 MG CHEWABLE TABLET 81 MG: 81 TABLET CHEWABLE at 17:37

## 2022-02-26 RX ADMIN — LANTHANUM CARBONATE 1000 MG: 500 TABLET, CHEWABLE ORAL at 18:10

## 2022-02-26 RX ADMIN — SODIUM CHLORIDE: 9 INJECTION, SOLUTION INTRAVENOUS at 21:18

## 2022-02-26 RX ADMIN — MIDODRINE HYDROCHLORIDE 10 MG: 10 TABLET ORAL at 17:37

## 2022-02-26 RX ADMIN — CEFEPIME HYDROCHLORIDE 2000 MG: 2 INJECTION, POWDER, FOR SOLUTION INTRAVENOUS at 11:31

## 2022-02-26 RX ADMIN — SODIUM CHLORIDE, PRESERVATIVE FREE 10 ML: 5 INJECTION INTRAVENOUS at 21:11

## 2022-02-26 RX ADMIN — SODIUM CHLORIDE: 9 INJECTION, SOLUTION INTRAVENOUS at 17:38

## 2022-02-26 RX ADMIN — VANCOMYCIN HYDROCHLORIDE 2000 MG: 1 INJECTION, POWDER, LYOPHILIZED, FOR SOLUTION INTRAVENOUS at 15:06

## 2022-02-26 RX ADMIN — SODIUM CHLORIDE 500 ML: 9 INJECTION, SOLUTION INTRAVENOUS at 10:12

## 2022-02-26 ASSESSMENT — ENCOUNTER SYMPTOMS
RHINORRHEA: 0
ABDOMINAL PAIN: 0
SHORTNESS OF BREATH: 1
DIARRHEA: 0
SORE THROAT: 0
VOMITING: 0
NAUSEA: 0
BACK PAIN: 0

## 2022-02-26 ASSESSMENT — PAIN SCALES - GENERAL
PAINLEVEL_OUTOF10: 0

## 2022-02-26 NOTE — ED NOTES
Critical labs of cre 7.91, trop 146 received from lab. Dr. Radha hO aware.      Christine Miranda, HODAN  02/26/22 0108

## 2022-02-26 NOTE — ED PROVIDER NOTES
[facility]  Emergency Department Encounter     Pt Name: Aj Hamilton  MRN: [id number]  [place] 1960  Date of evaluation: 2/26/22  PCP:  No primary care provider on file. CHIEF COMPLAINT       Chief Complaint   Patient presents with    Fever       HISTORY OF PRESENT ILLNESS  (Location/Symptom, Timing/Onset, Context/Setting, Quality, Duration, Modifying Factors, Severity.)    Aj Hamilton is a 62 y.o. male who has a past medical history of congestive heart failure, coronary artery disease, COPD, end-stage renal disease on hemodialysis Tuesday Thursday Saturday who presents emergency department from dialysis with fever, shortness of breath. Patient was able to get the part of his dialysis treatment but not full dialysis treatment today. Patient states that shortness of breath started this morning. He has not noticed any increased leg swelling or weight gain in relation to his heart failure. Was unaware that he had a fever. No headache, congestion, runny nose, sore throat. No abdominal pain nausea vomiting or diarrhea. No cough. No longer makes any urine. Of note patient recently had a right groin Pj catheter placed due to his fistula in his right arm not functioning. PAST MEDICAL / SURGICAL / SOCIAL / FAMILY HISTORY    has a past medical history of A-V fistula ([address] AICD (automatic cardioverter/defibrillator) present, Asthma, Atrial fibrillation ([address] Blood transfusion reaction, CAD (coronary artery disease), CHF (congestive heart failure) ([address] CHF (congestive heart failure) ([address] COPD (chronic obstructive pulmonary disease) ([address] CRF (chronic renal failure), Diabetes mellitus ([address] Dialysis patient ([address] GERD (gastroesophageal reflux disease), Hemodialysis patient ([address] Hypertension, and Obesity. has a past surgical history that includes vascular surgery; Cardiac defibrillator placement (2006); and pacemaker placement.     Social History Socioeconomic History    Marital status: Legally      Spouse name: Not on file    Number of children: Not on file    Years of education: Not on file    Highest education level: Not on file   Occupational History    Not on file   Tobacco Use    Smoking status: Never Smoker    Smokeless tobacco: Never Used   Substance and Sexual Activity    Alcohol use: No    Drug use: No    Sexual activity: Not on file   Other Topics Concern    Not on file   Social History Narrative    Not on file     Social Determinants of Health     Financial Resource Strain:     Difficulty of Paying Living Expenses: Not on file   Food Insecurity:     Worried About Running Out of Food in the Last Year: Not on file    Hali of Food in the Last Year: Not on file   Transportation Needs:     Lack of Transportation (Medical): Not on file    Lack of Transportation (Non-Medical): Not on file   Physical Activity:     Days of Exercise per Week: Not on file    Minutes of Exercise per Session: Not on file   Stress:     Feeling of Stress : Not on file   Social Connections:     Frequency of Communication with Friends and Family: Not on file    Frequency of Social Gatherings with Friends and Family: Not on file    Attends [employer] Services: Not on file    Active Member of [address] [place] [address] [employer] or Organizations: Not on file    Attends Club or Organization Meetings: Not on file    Marital Status: Not on file   Intimate Partner Violence:     Fear of Current or Ex-Partner: Not on file    Emotionally Abused: Not on file    Physically Abused: Not on file    Sexually Abused: Not on file   Housing Stability:     Unable to Pay for Housing in the Last Year: Not on file    Number of [place] in the Last Year: Not on file    Unstable Housing in the Last Year: Not on file       Family History   Adopted: Yes       Allergies:    Spironolactone    Home Medications:  Prior to Admission medications    Medication Sig Start Date End Date Taking? Authorizing Provider   cinacalcet (SENSIPAR) 60 MG tablet 60 mg every Tuesday Thursday and Saturday with dialysis 10/21/20   Michel Astudillo MD   ceFAZolin (ANCEF) 1 g injection 3 gm with each dialysis session. End date of 10/30/20 10/21/20   Michel Astudillo MD   methocarbamol (ROBAXIN) 750 MG tablet Take 750 mg by mouth 3 times daily    Historical Provider, MD   ELIQUIS 5 MG TABS tablet TAKE ONE TABLET BY MOUTH TWICE DAILY 7/29/19   Historical Provider, MD   lanthanum (FOSRENOL) 1000 MG chewable tablet CHEW AND SWALLOW 1 TABLET THREE TIMES A DAY WITH MEALS 7/15/19   Historical Provider, MD   metoprolol succinate (TOPROL XL) 25 MG extended release tablet TAKE ONE TABLET BY MOUTH EVERY DAY 7/3/19   Historical Provider, MD LOPEZ Complex-C-Folic Acid (LOLA-GRACE) TABS TAKE 1 TABLET BY MOUTH EVERY EVENING 6/5/19   Historical Provider, MD   aspirin 81 MG chewable tablet Take 81 mg by mouth daily    Historical Provider, MD   midodrine (PROAMATINE) 5 MG tablet Take 10 mg by mouth as needed    Historical Provider, MD       REVIEW OF SYSTEMS    (2-9 systems for level 4, 10 or more for level 5)    Review of Systems   Constitutional: Negative for chills, diaphoresis and fever. HENT: Negative for congestion, rhinorrhea and sore throat. Respiratory: Positive for shortness of breath. Cardiovascular: Negative for chest pain and leg swelling. Gastrointestinal: Negative for abdominal pain, diarrhea, nausea and vomiting. Musculoskeletal: Negative for back pain and myalgias. Neurological: Negative for dizziness, weakness and light-headedness. PHYSICAL EXAM   (up to 7 for level 4, 8 or more for level 5)    VITALS:   Vitals:    02/26/22 1115 02/26/22 1116 02/26/22 1130 02/26/22 1233   BP:  (!) 101/58 (!) 110/59 (!) 114/59   Pulse: 129 117 113 112   Resp: 24  21 25   Temp:    103.1 °F (39.5 °C)   TempSrc:    Oral   SpO2:    96%   Height:           Physical Exam  Vitals and nursing note reviewed.    Constitutional:       General: He is not in acute distress. Appearance: He is well-developed. He is obese. He is ill-appearing. He is not diaphoretic. HENT:      Head: Normocephalic and atraumatic. Right Ear: External ear normal.      Left Ear: External ear normal.      Nose: Nose normal.   Eyes:      Conjunctiva/sclera: Conjunctivae normal.   Cardiovascular:      Rate and Rhythm: Regular rhythm. Tachycardia present. Heart sounds: Normal heart sounds. Arteriovenous access: right arteriovenous access is present. Pulmonary:      Effort: Pulmonary effort is normal. Tachypnea present. No respiratory distress. Breath sounds: Normal breath sounds. No wheezing, rhonchi or rales. Abdominal:      General: There is no distension. Palpations: Abdomen is soft. Tenderness: There is no abdominal tenderness. There is no guarding or rebound. Musculoskeletal:         General: Normal range of motion. Cervical back: Normal range of motion. Right lower leg: No edema. Left lower leg: No edema. Skin:     General: Skin is warm and dry. Neurological:      General: No focal deficit present. Mental Status: He is alert.    Psychiatric:         Behavior: Behavior normal.         DIFFERENTIAL  DIAGNOSIS   PLAN (LABS / IMAGING / EKG):  Orders Placed This Encounter   Procedures    COVID-19, Rapid    Culture, Blood 1    Culture, Blood 1    XR CHEST 1 VIEW    CT CHEST ABDOMEN PELVIS WO CONTRAST    CT HEAD WO CONTRAST    CBC with Auto Differential    Comprehensive Metabolic Panel w/ Reflex to MG    APTT    Protime-INR    Lactate, Sepsis    Troponin    Brain Natriuretic Peptide    Troponin    Lactate Dehydrogenase    Fibrinogen    Troponin    Telemetry monitoring - continuous duration    Inpatient consult to Cardiology    Pharmacy to Dose: Vancomycin    Inpatient consult to Hospitalist    EKG 12 Lead    Insert peripheral IV       MEDICATIONS ORDERED:  Orders Placed This Encounter   Medications  0.9 % sodium chloride bolus    DISCONTD: acetaminophen (TYLENOL) tablet 1,000 mg    cefepime (MAXIPIME) 2000 mg IVPB minibag     Order Specific Question:   Antimicrobial Indications     Answer:   Sepsis of Unknown Etiology    magnesium sulfate 2000 mg in 50 mL IVPB premix    vancomycin (VANCOCIN) 2,000 mg in dextrose 5 % 500 mL IVPB     Order Specific Question:   Antimicrobial Indications     Answer:   Sepsis of Unknown Etiology    acetaminophen (TYLENOL) tablet 1,000 mg     DIAGNOSTIC RESULTS / EMERGENCYDEPARTMENT COURSE / MDM   LABS:  Labs Reviewed   CBC WITH AUTO DIFFERENTIAL - Abnormal; Notable for the following components:       Result Value    WBC 15.4 (*)     RBC 3.24 (*)     Hemoglobin 10.0 (*)     Hematocrit 30.8 (*)     Platelets 570 (*)     Seg Neutrophils 88 (*)     Lymphocytes 7 (*)     Eosinophils % 0 (*)     Immature Granulocytes 1 (*)     Segs Absolute 13.58 (*)     Absolute Lymph # 1.06 (*)     All other components within normal limits   COMPREHENSIVE METABOLIC PANEL W/ REFLEX TO MG FOR LOW K - Abnormal; Notable for the following components:    BUN 25 (*)     CREATININE 7.91 (*)     Bun/Cre Ratio 3 (*)     Sodium 133 (*)     Chloride 90 (*)     Total Protein 8.4 (*)     GFR Non- 7 (*)     GFR  8 (*)     All other components within normal limits   APTT - Abnormal; Notable for the following components:    PTT >150.0 (*)     All other components within normal limits   PROTIME-INR - Abnormal; Notable for the following components:    Protime 15.0 (*)     All other components within normal limits   TROPONIN - Abnormal; Notable for the following components:    Troponin, High Sensitivity 146 (*)     All other components within normal limits   BRAIN NATRIURETIC PEPTIDE - Abnormal; Notable for the following components:    Pro-BNP 2,319 (*)     All other components within normal limits   TROPONIN - Abnormal; Notable for the following components:    Troponin, High Sensitivity 153 (*)     All other components within normal limits   COVID-19, RAPID   CULTURE, BLOOD 1   CULTURE, BLOOD 1   LACTATE, SEPSIS   LACTATE, SEPSIS   LACTATE DEHYDROGENASE   FIBRINOGEN   TROPONIN       RADIOLOGY:  CT HEAD WO CONTRAST    Result Date: 2/26/2022  EXAMINATION: CT OF THE HEAD WITHOUT CONTRAST  2/26/2022 12:51 pm TECHNIQUE: CT of the head was performed without the administration of intravenous contrast. Dose modulation, iterative reconstruction, and/or weight based adjustment of the mA/kV was utilized to reduce the radiation dose to as low as reasonably achievable. COMPARISON: None. HISTORY: ORDERING SYSTEM PROVIDED HISTORY: APTT >150, sepsis, drowsy TECHNOLOGIST PROVIDED HISTORY: APTT >150, sepsis, drowsy Decision Support Exception - unselect if not a suspected or confirmed emergency medical condition->Emergency Medical Condition (MA) Reason for Exam: sepsis FINDINGS: BRAIN/VENTRICLES: There is no acute intracranial hemorrhage, mass effect or midline shift. No abnormal extra-axial fluid collection. The gray-white differentiation is maintained without evidence of an acute infarct. There is no evidence of hydrocephalus. ORBITS: The visualized portion of the orbits demonstrate no acute abnormality. SINUSES: The visualized paranasal sinuses and mastoid air cells demonstrate no acute abnormality. SOFT TISSUES/SKULL:  No acute abnormality of the visualized skull or soft tissues. No acute intracranial abnormality. XR CHEST 1 VIEW    Result Date: 2/26/2022  EXAMINATION: ONE XRAY VIEW OF THE CHEST 2/26/2022 10:35 am COMPARISON: October 17, 2020 HISTORY: ORDERING SYSTEM PROVIDED HISTORY: SOB TECHNOLOGIST PROVIDED HISTORY: SOB Reason for Exam: Port upright AP CXR - SOB FINDINGS: Lungs overall clear. No pneumothorax or significant pleural effusion. Similar elevated right hemidiaphragm. Cardiac size stable. Mediastinum unremarkable. Vascular stent right subclavian region. Pacer unchanged. No acute osseous abnormality. Telemetry leads overlie the chest.     No acute findings. CT CHEST ABDOMEN PELVIS WO CONTRAST    Result Date: 2/26/2022  EXAMINATION: CT OF THE CHEST, ABDOMEN, AND PELVIS WITHOUT CONTRAST 2/26/2022 12:51 pm TECHNIQUE: CT of the chest, abdomen and pelvis was performed without the administration of intravenous contrast. Multiplanar reformatted images are provided for review. Dose modulation, iterative reconstruction, and/or weight based adjustment of the mA/kV was utilized to reduce the radiation dose to as low as reasonably achievable. COMPARISON: CT abdomen pelvis 05/25/2015 HISTORY: ORDERING SYSTEM PROVIDED HISTORY: SOB sepsis APTT >150 TECHNOLOGIST PROVIDED HISTORY: SOB sepsis APTT >150 Decision Support Exception - unselect if not a suspected or confirmed emergency medical condition->Emergency Medical Condition (MA) Reason for Exam: fever, evaluate for infection FINDINGS: Chest: Mediastinum: Cardiac size normal.  Pacemaker wires in place. There are scattered calcified mediastinal hilar lymph nodes. Thyroid gland and esophagus grossly normal.  Normal caliber aorta. Lungs/pleura: Several scattered calcified nodules representing granulomas noted. Small right costophrenic angle pleural-parenchymal density likely subsegmental atelectasis. Developing pneumonia much less favored. No convincing evidence for pneumonia. No pleural effusion or pneumothorax. Central airways unremarkable. Soft Tissues/Bones: No acute abnormality of the bones. The superficial soft tissues show no significant abnormalities. Abdomen/Pelvis: Organs: Limited evaluation the absence of IV contrast. Liver, spleen, pancreas, adrenal glands show no significant abnormalities. Gallbladder absent. Renal parenchymal atrophy with innumerable bilateral renal cysts compatible with chronic renal parenchymal disease. Cysts have significantly increased from prior.   There are a couple of punctate renal calculi on the left.  No hydronephrosis. GI/Bowel: There is limited evaluation due to absence of oral contrast. The stomach shows no focal lesions. Small bowel loops normal in caliber showing no focal abnormalities. Normal appendix. Evaluation of the colon shows no acute process. Pelvis: The urinary bladder is unremarkable. No suspicious pelvic mass. Peritoneum/Retroperitoneum: No free intraperitoneal fluid or significant lymphadenopathy. There is right femoral vein central line terminating in IVC at about level of left renal vein. Bones/Soft Tissues: Degenerative changes in the spine. 1. No definite evidence for acute infective or inflammatory process. 2. A small focal area of pleural-parenchymal density at the right costophrenic angle in the lower lobe most suggestive of subsegmental atelectasis. Pneumonia considered much less likely. 3. Interval development of innumerable bilateral renal cysts with decrease in renal parenchymal tissue compatible with chronic renal parenchymal disease. 4. No abnormal fluid collections. 5. Right femoral vein central line terminating distally in the IVC. EKG    EKG Interpretation    Interpreted by emergency department physician    Rhythm: sinus tachycardia  Rate: 130-140  Axis: normal  Ectopy: none  Conduction: QTc 577  ST Segments: depression in  v4, v5, v6, I, II and aVf  T Waves: inversion in  aVl  Q Waves: none    Clinical Impression: non-specific EKG. ST depressions in inferolateral leads new compared to prior in 10/17/20    All EKG's are interpreted by the Emergency Department Physician whoeither signs or Co-signs this chart in the absence of a cardiologist.    EMERGENCY DEPARTMENT COURSE:  ED Course as of 02/26/22 1451   Sat Feb 26, 2022   1026 CBC with Auto Differential(!):    WBC 15.4(!)   RBC 3.24(!)   Hemoglobin Quant 10. 0(!)   Hematocrit 30.8(!)   MCV 95.1   MCH 30.9   MCHC 32.5   RDW 13.9   Platelet Count 928(!)   MPV 11.7   NRBC Automated 0.0   Seg Neutrophils 88(!) Lymphocytes 7(!)   Monocytes 4   Eosinophils % 0(!)   Basophils 0   Immature Granulocytes 1(!)   Segs Absolute 13.58(!)   Absolute Lymph # 1.06(!)   Absolute Mono # 0.64   Absolute Eos # 0.03   Basophils Absolute 0.03   Absolute Immature Granulocyte 0.07 [AO]   1026 SARS-CoV-2, Rapid: Not Detected [AO]   1026 EKG showed new ST depression in I II aVF V4-V6. T wave inversion in aVL old. NO acute reciprocal elevation [AO]   1031 Speaking to Dr. Jonathan Morataya. Feels like the diffuse depression is likely from sepsis. Continue to trend to troponins. Medically treat. Wouldn't do anything acutely invasive right now due to infection. If troponins trend upwards recommend heparin [AO]   1031 INR: 1.2 [AO]   1032 Lactic Acid, Sepsis: 1.9 [AO]   1039 Pro-BNP(!): 2,319 [AO]   1039 Comprehensive Metabolic Panel w/ Reflex to MG(!!):    GLUCOSE, FASTING,GF 98   BUN,BUNPL 25(!)   Creatinine 7.91(!!)   Bun/Cre Ratio 3(!)   CALCIUM, SERUM, 752552 9.5   Sodium 133(!)   Potassium 4.5   Chloride 90(!)   CO2 27   Anion Gap 16   Alk Phos 110   ALT 13   AST PENDING   Bilirubin 0.77   Total Protein 8.4(!)   Albumin 4.1   GFR Non- 7(!)   GFR  8(!)   GFR Comment      [AO]   1039 Troponin(!!):    Troponin, High Sensitivity 146(!!) [AO]   1115 XR CHEST 1 VIEW [AO]   1144 PTT(!!): >150.0  Lab ran twice [AO]   5369 Pt updated, seems more drowsy than initial evaluation for me.  Will add on head CT in light of aptt being elevated  [AO]   1256 Lactic Acid, Sepsis: 1.8 [AO]   1306 Troponin(!!):    Troponin, High Sensitivity 153(!!) [AO]   1352 CT HEAD WO CONTRAST [AO]   1426 CT CHEST ABDOMEN PELVIS WO CONTRAST [AO]   26 [facility] accepted  [AO]      ED Course User Index  [AO] Rhiannon Jonh Juarez 1721, DO       MDM  Number of Diagnoses or Management Options  Prolonged Q-T interval on ECG  Septicemia ([address]  Shortness of breath  Diagnosis management comments: 11year-old male with multiple medical comorbidities presents emergency readings),            then 30ml/kg crystalloid fluid bolus infused, and may give over 4 hour duration. Is the patient Obese (BMI > 30): Morbidly obese, ordering 30ml/kg fluid bolus based on IBW    Body mass index is 35.14 kg/m². Patient weight not recorded    If Obese the Ideal Body  (Kayla Ch): Ideal body weight (IBW) (men) = 50 kg + 2.3 kg x (height, inches - 60)   Ideal body weight (IBW) (women) = 45.5 kg + 2.3 kg x (height, inches - 60)    4)  Repeat Sepsis Exam completed during fluid bolus at time:  N/A    5)  Vasopressors: For persistent hypotension after completion of 30ml/kg fluid bolus (need to measure BP Q 15 min in the hour after completion of fluid bolus). Discuss risks and benefits of vasopressors and alternative therapies with patient and/or DPOA. Patient has a history of congestive heart failure with AICD in place as well as end-stage renal disease on hemodialysis unable to complete full treatment today. He is already short of breath and tachypneic. I feel like a 30 cc/kg bolus would be detrimental to patient's respiratory status. Was giving diligent fluids (500 cc bolus) as tolerated. Patient Progress  Patient progress: stable      PROCEDURES:  Procedures     CONSULTS:  IP CONSULT TO CARDIOLOGY  PHARMACY TO DOSE VANCOMYCIN  IP CONSULT TO HOSPITALIST    CRITICAL CARE:  NONE    FINAL IMPRESSION     1. Shortness of breath    2. Prolonged Q-T interval on ECG    3. Septicemia ([address]      DISPOSITION / PLAN   DISPOSITION      ADMIT    Susanne Paredes DO  Emergency Medicine Physician    (Please note that portions of this note were completed with a voice recognition program.  Efforts were made to edit the dictations but occasionally words are mis-transcribed.)        Rhiannon Juarez 1721, DO  02/26/22 27 Sarina Patterson, DO  02/27/22 1983

## 2022-02-26 NOTE — H&P
[facility]  Office: [address], DO, Maddy Ayanna DO, Kathia Levi DO, Heraclio Lozada Júnior, DO, Timmy May MD, Monica Das MD, Leonel Wills MD, Iglesia Garcia MD, Mimi Mcneil MD, Sundar Dave MD, Aysha Harris MD, Yaz Goss, DO, Valdemar Bhatia DO, Torsten Clement MD,  Renee Mendenhall DO, Paige Paiz MD, Davidson Hunt MD, Fred Wooten MD, Jenniffer Fernandez MD, Mark Lay MD, [facility] Brendan, [facility], [facility], CNP, Nathaniel Syed, CNP, Daniela Cast, CNS, Sheron Guardado CNP, Monica Darnell, CNP, Dayanara Young, CNP, Rodrigo Hickey, CNP, Landy Delvalle, CNP, Killian Jeffers PA-C, Nikita Mcmahon, MEÑO, Maddy Alvarenga, MEÑO, Aroldo Mendes, CNP, Rick Rios, CNP, Tejinder Neves, CNP, Jenelle Maynard, CNP, Miryam Contreras, CNP, Shirin Silvestre, [address]    HISTORY AND PHYSICAL EXAMINATION            Date:   2/26/2022  Patient name:  Roseanne Herman  Date of admission:  2/26/2022  9:13 AM  MRN:   [id number]  Account:  [de-identified]  YOB: 1960  PCP:    No primary care provider on file. Room:   [address] [place] [address]  Code Status:    Full Code    Chief Complaint:     Chief Complaint   Patient presents with    Fever        History Obtained From:     patient, electronic medical record    History of Present Illness: Roseanne Herman is a 62 y.o. Non- / non  male who presents with Fever   and is admitted to the hospital for the management of Sepsis ([facility] This 62 yom presents from dialysis center with fever to 103. He was unaware he had a fever and his only symptoms have been fatigue (chronic), sob/looney, and 4 episodes of diarrhea yesterday. He also noticed a little reddish drainage from HD cath in groin.   Denies c/s/s/cough/n/v/abd pain/cp/palpitations    Past Medical History:     Past Medical History:   Diagnosis Date    A-V fistula ([facility]     lt arm    AICD (automatic cardioverter/defibrillator) present 2006    Asthma     Atrial fibrillation ([facility]     Blood transfusion reaction     CAD (coronary artery disease)     dr Suleiman Castro cardiologist recently aicd check /clearance 4/15    CHF (congestive heart failure) ([facility]     CHF (congestive heart failure) ([facility]     COPD (chronic obstructive pulmonary disease) ([facility]     CRF (chronic renal failure)     Diabetes mellitus ([facility]     IDDM    Dialysis patient ([facility]     GERD (gastroesophageal reflux disease)     Hemodialysis patient ([facility]     laskey tues-thurs-sat    Hypertension     treated w/ meds    Obesity         Past Surgical History:     Past Surgical History:   Procedure Laterality Date    CARDIAC DEFIBRILLATOR PLACEMENT  2006    PACEMAKER PLACEMENT      VASCULAR SURGERY      L avf        Medications Prior to Admission:     Prior to Admission medications    Medication Sig Start Date End Date Taking? Authorizing Provider   cinacalcet (SENSIPAR) 60 MG tablet 60 mg every Tuesday Thursday and Saturday with dialysis 10/21/20   Pedro Dominique MD   ceFAZolin (ANCEF) 1 g injection 3 gm with each dialysis session.  End date of 10/30/20 10/21/20   Pedro Dominique MD   methocarbamol (ROBAXIN) 750 MG tablet Take 750 mg by mouth 3 times daily    Historical Provider, MD   ELIQUIS 5 MG TABS tablet TAKE ONE TABLET BY MOUTH TWICE DAILY 7/29/19   Historical Provider, MD   lanthanum (FOSRENOL) 1000 MG chewable tablet CHEW AND SWALLOW 1 TABLET THREE TIMES A DAY WITH MEALS 7/15/19   Historical Provider, MD   metoprolol succinate (TOPROL XL) 25 MG extended release tablet TAKE ONE TABLET BY MOUTH EVERY DAY 7/3/19   Historical Provider, MD   B Complex-C-Folic Acid (LOLA-GRACE) TABS TAKE 1 TABLET BY MOUTH EVERY EVENING 6/5/19   Historical Provider, MD   aspirin 81 MG chewable tablet Take 81 mg by mouth daily    Historical Provider, MD   midodrine (PROAMATINE) 5 MG tablet Take 10 mg by mouth as needed    Historical Provider, MD        Allergies:     Spironolactone    Social History: Tobacco:    reports that he has never smoked. He has never used smokeless tobacco.  Alcohol:      reports no history of alcohol use. Drug Use:  reports no history of drug use. Family History:     Family History   Adopted: Yes       Review of Systems:     Positive and Negative as described in HPI. CONSTITUTIONAL:  negative for chills, sweats, weight loss  HEENT:  negative for vision, hearing changes, runny nose, throat pain  RESPIRATORY:  negative for cough, congestion, wheezing  CARDIOVASCULAR:  negative for chest pain, palpitations  GASTROINTESTINAL:  negative for nausea, vomiting, constipation, abdominal pain   GENITOURINARY:  negative for difficulty of urination, burning with urination, frequency   INTEGUMENT:  negative for rash, skin lesions, easy bruising   HEMATOLOGIC/LYMPHATIC:  negative for swelling/edema   ALLERGIC/IMMUNOLOGIC:  negative for urticaria , itching  ENDOCRINE:  negative increase in drinking, increase in urination, hot or cold intolerance  MUSCULOSKELETAL:  negative joint pains, muscle aches, swelling of joints  NEUROLOGICAL:  negative for headaches, dizziness, lightheadedness, numbness, pain, tingling extremities  BEHAVIOR/PSYCH:  negative for depression, anxiety    Physical Exam:   BP (!) 92/58   Pulse 86   Temp 99 °F (37.2 °C) (Oral)   Resp 18   Ht 6' 1\" (1.854 m)   SpO2 95%   BMI 35.14 kg/m²   Temp (24hrs), Av.1 °F (38.4 °C), Min:99 °F (37.2 °C), Max:103.1 °F (39.5 °C)    No results for input(s): POCGLU in the last 72 hours.   No intake or output data in the 24 hours ending 22 1703    General Appearance:  alert, well appearing, and in no acute distress  Mental status: oriented to person, place, and time  Head:  normocephalic, atraumatic  Eye: no icterus, redness, pupils equal and reactive, extraocular eye movements intact, conjunctiva clear  Ear: normal external ear, no discharge, hearing intact  Nose:  no drainage noted  Mouth: mucous membranes moist  Neck: supple, no carotid bruits, thyroid not palpable  Lungs: Bilateral equal air entry, clear to auscultation, no wheezing, rales or rhonchi, normal effort  Cardiovascular: normal rate, regular rhythm, no murmur, gallop, rub. Abdomen: Soft, nontender, nondistended, normal bowel sounds, no hepatomegaly or splenomegaly; obese  Neurologic: There are no new focal motor or sensory deficits, normal muscle tone and bulk, no abnormal sensation, normal speech, cranial nerves II through XII grossly intact  Skin: No gross lesions, rashes, bruising or bleeding on exposed skin area; right groin HD cath intact without redness or drainage around it  Extremities:  peripheral pulses palpable, no pedal edema or calf pain with palpation  Psych: normal affect     Investigations:      Laboratory Testing:  Recent Results (from the past 24 hour(s))   COVID-19, Rapid    Collection Time: 02/26/22  9:44 AM    Specimen: Nasopharyngeal Swab   Result Value Ref Range    Specimen Description . NASOPHARYNGEAL SWAB     SARS-CoV-2, Rapid Not Detected Not Detected   CBC with Auto Differential    Collection Time: 02/26/22 10:00 AM   Result Value Ref Range    WBC 15.4 (H) 3.5 - 11.3 k/uL    RBC 3.24 (L) 4.21 - 5.77 m/uL    Hemoglobin 10.0 (L) 13.0 - 17.0 g/dL    Hematocrit 30.8 (L) 40.7 - 50.3 %    MCV 95.1 82.6 - 102.9 fL    MCH 30.9 25.2 - 33.5 pg    MCHC 32.5 28.4 - 34.8 g/dL    RDW 13.9 11.8 - 14.4 %    Platelets 229 (L) 660 - 453 k/uL    MPV 11.7 8.1 - 13.5 fL    NRBC Automated 0.0 0.0 per 100 WBC    Seg Neutrophils 88 (H) 36 - 65 %    Lymphocytes 7 (L) 24 - 43 %    Monocytes 4 3 - 12 %    Eosinophils % 0 (L) 1 - 4 %    Basophils 0 0 - 2 %    Immature Granulocytes 1 (H) 0 %    Segs Absolute 13.58 (H) 1.50 - 8.10 k/uL    Absolute Lymph # 1.06 (L) 1.10 - 3.70 k/uL    Absolute Mono # 0.64 0.10 - 1.20 k/uL    Absolute Eos # 0.03 0.00 - 0.44 k/uL    Basophils Absolute 0.03 0.00 - 0.20 k/uL    Absolute Immature Granulocyte 0.07 0.00 - 0.30 k/uL Comprehensive Metabolic Panel w/ Reflex to MG    Collection Time: 02/26/22 10:00 AM   Result Value Ref Range    Glucose 98 70 - 99 mg/dL    BUN 25 (H) 8 - 23 mg/dL    CREATININE 7.91 (HH) 0.70 - 1.20 mg/dL    Bun/Cre Ratio 3 (L) 9 - 20    Calcium 9.5 8.6 - 10.4 mg/dL    Sodium 133 (L) 135 - 144 mmol/L    Potassium 4.5 3.7 - 5.3 mmol/L    Chloride 90 (L) 98 - 107 mmol/L    CO2 27 20 - 31 mmol/L    Anion Gap 16 9 - 17 mmol/L    Alkaline Phosphatase 110 40 - 129 U/L    ALT 13 5 - 41 U/L    AST 16 <40 U/L    Total Bilirubin 0.77 0.3 - 1.2 mg/dL    Total Protein 8.4 (H) 6.4 - 8.3 g/dL    Albumin 4.1 3.5 - 5.2 g/dL    GFR Non- 7 (L) >60 mL/min    GFR  8 (L) >60 mL/min    GFR Comment         APTT    Collection Time: 02/26/22 10:00 AM   Result Value Ref Range    PTT >150.0 (HH) 23.9 - 33.8 sec   Protime-INR    Collection Time: 02/26/22 10:00 AM   Result Value Ref Range    Protime 15.0 (H) 11.5 - 14.2 sec    INR 1.2    Culture, Blood 1    Collection Time: 02/26/22 10:00 AM    Specimen: Blood   Result Value Ref Range    Specimen Description . BLOOD     Special Requests RAC 12ML     Culture NO GROWTH <24 HRS    Lactate, Sepsis    Collection Time: 02/26/22 10:00 AM   Result Value Ref Range    Lactic Acid, Sepsis 1.9 0.5 - 1.9 mmol/L   Troponin    Collection Time: 02/26/22 10:00 AM   Result Value Ref Range    Troponin, High Sensitivity 146 (HH) 0 - 22 ng/L   Brain Natriuretic Peptide    Collection Time: 02/26/22 10:00 AM   Result Value Ref Range    Pro-BNP 2,319 (H) <300 pg/mL   Fibrinogen    Collection Time: 02/26/22 10:00 AM   Result Value Ref Range    Fibrinogen 477 179 - 518 mg/dL   Culture, Blood 1    Collection Time: 02/26/22 10:16 AM    Specimen: Blood   Result Value Ref Range    Specimen Description . BLOOD 10 ML  RIGHT HAND     Culture NO GROWTH <24 HRS    EKG 12 Lead    Collection Time: 02/26/22 10:19 AM   Result Value Ref Range    Ventricular Rate 132 BPM    Atrial Rate 132 BPM    P-R Interval 160 ms    QRS Duration 84 ms    Q-T Interval 390 ms    QTc Calculation (Bazett) 577 ms    P Axis 63 degrees    T Axis 64 degrees   Lactate, Sepsis    Collection Time: 02/26/22 12:25 PM   Result Value Ref Range    Lactic Acid, Sepsis 1.8 0.5 - 1.9 mmol/L   Troponin    Collection Time: 02/26/22 12:25 PM   Result Value Ref Range    Troponin, High Sensitivity 153 (HH) 0 - 22 ng/L   Lactate Dehydrogenase    Collection Time: 02/26/22 12:25 PM   Result Value Ref Range     135 - 225 U/L   Troponin    Collection Time: 02/26/22  3:38 PM   Result Value Ref Range    Troponin, High Sensitivity 163 (HH) 0 - 22 ng/L       Imaging/Diagnostics:  CT HEAD WO CONTRAST    Result Date: 2/26/2022  No acute intracranial abnormality. XR CHEST 1 VIEW    Result Date: 2/26/2022  No acute findings. CT CHEST ABDOMEN PELVIS WO CONTRAST    Result Date: 2/26/2022  1. No definite evidence for acute infective or inflammatory process. 2. A small focal area of pleural-parenchymal density at the right costophrenic angle in the lower lobe most suggestive of subsegmental atelectasis. Pneumonia considered much less likely. 3. Interval development of innumerable bilateral renal cysts with decrease in renal parenchymal tissue compatible with chronic renal parenchymal disease. 4. No abnormal fluid collections. 5. Right femoral vein central line terminating distally in the IVC. Assessment :      Hospital Problems           Last Modified POA    * (Principal) Sepsis ([address] 2/26/2022 Yes    Biventricular CHF (congestive heart failure) ([address] with icd chronic  2/26/2022 Yes    Paroxysmal atrial fibrillation ([address] 2/26/2022 Yes    Overview Signed 2/3/2014  3:55 PM by Babs Cooper,      Chads 2 score. On ASA.  Pt has refused coumadin therapy in the past.          Controlled type 2 diabetes mellitus with chronic kidney disease on chronic dialysis, without long-term current use of insulin ([address] 2/26/2022 Yes    ESRD (end stage renal disease) on dialysis ([facility] 2/26/2022 Yes    Anemia of chronic disease 2/26/2022 Yes    CAD (coronary artery disease) 2/26/2022 Yes    Overview Signed 10/18/2020  8:28 AM by MD dr Raven Francisco cardiologist recently aicd check /clearance 4/15               Plan:     Patient status inpatient in the Progressive Unit/Step down    1. Cardio consulted by ER  2. Renal consult for dialysis  3. Gentle ivf given esrd and he did not complete dialysis today; but he is hypotensive  4. Broad spectrum iv antibiotics for sepsis unknown origin-though line sepsis likely (however, also has aicd)  5. Check c dif as he had diarrhea x4 yesterday  6. Tylenol for fever  7. On eliquis for prior PE, and paf  8. Monitor bp closely    Consultations:   IP CONSULT TO CARDIOLOGY  IP CONSULT TO HOSPITALIST  IP CONSULT TO NEPHROLOGY  PHARMACY TO DOSE VANCOMYCIN     Patient is admitted as inpatient status because of co-morbidities listed above, severity of signs and symptoms as outlined, requirement for current medical therapies and most importantly because of direct risk to patient if care not provided in a hospital setting. Expected length of stay > 48 hours. Montez Callejas DO  2/26/2022  5:03 PM    Copy sent to Dr. Brock primary care provider on file.

## 2022-02-26 NOTE — ED NOTES
Pt resting quietly on stretcher. Lights turned down in room for pt comfort. Pt states he doesn't feel as though his breathing has improved. Pt is less tachypneic and respirations appear to require less exertion than upon arrival. SpO2 95% on RA. O2 @ 2L NC placed for comfort. Pt medicated as ordered and documented.       Andrew Rivas RN  02/26/22 [id number]

## 2022-02-26 NOTE — ED NOTES
Pt transported to CT scan via stretcher. Accompanied by transport techRashi Cohen, HODAN  02/26/22 9665

## 2022-02-26 NOTE — PLAN OF CARE
Problem: Falls - Risk of:  Goal: Will remain free from falls  Description: Will remain free from falls. Fall risk assessment completed. Patient instructed to use call light. Bed locked and in lowest position, side rails up 2/4, call light and bedside table within reach, clutter removed, and non-skid footwear on when pt out of bed. Hourly rounds will continue.  Bed alarm in use  Outcome: Ongoing     Problem: Cardiac:  Goal: Ability to maintain vital signs within normal range will improve  Description: Ability to maintain vital signs within normal range will improve  Outcome: Ongoing     Problem: Infection, Septic Shock:  Goal: Will show no infection signs and symptoms  Description: Will show no infection signs and symptoms  Outcome: Ongoing

## 2022-02-26 NOTE — ED NOTES
Pt arrives via EMS from dialysis tx with report of fever. Per EMS, pt's temp was 103 at dialysis, he was given Tylenol 1Gm approx 0630 and upon recheck, temp was down to 99. Pt c/o chills and has tremors upon arrival, c/o SOB. Tachypnea present with labored breathing - no adventitious breath sounds present. Skin very warm to touch. Pt denies recent ill contacts, rhinorrhea, cough, abd pain. Reports not feeling well x past 2 days, but today is the worst. Received nearly 3 hours of dialysis tx this morning.       Emperatriz Bentley RN  02/26/22 9719

## 2022-02-27 PROBLEM — A41.01 SEPSIS DUE TO METHICILLIN SUSCEPTIBLE STAPHYLOCOCCUS AUREUS (MSSA) WITHOUT ACUTE ORGAN DYSFUNCTION (HCC): Status: ACTIVE | Noted: 2022-02-26

## 2022-02-27 LAB
ALBUMIN SERPL-MCNC: 3.4 G/DL (ref 3.5–5.2)
ALP BLD-CCNC: 79 U/L (ref 40–129)
ALT SERPL-CCNC: 16 U/L (ref 5–41)
ANION GAP SERPL CALCULATED.3IONS-SCNC: 14 MMOL/L (ref 9–17)
AST SERPL-CCNC: 28 U/L
BILIRUB SERPL-MCNC: 0.41 MG/DL (ref 0.3–1.2)
BUN BLDV-MCNC: 39 MG/DL (ref 8–23)
BUN/CREAT BLD: 4 (ref 9–20)
CALCIUM SERPL-MCNC: 9.6 MG/DL (ref 8.6–10.4)
CHLORIDE BLD-SCNC: 92 MMOL/L (ref 98–107)
CO2: 26 MMOL/L (ref 20–31)
CREAT SERPL-MCNC: 10.83 MG/DL (ref 0.7–1.2)
GFR AFRICAN AMERICAN: 6 ML/MIN
GFR NON-AFRICAN AMERICAN: 5 ML/MIN
GFR SERPL CREATININE-BSD FRML MDRD: ABNORMAL ML/MIN/{1.73_M2}
GLUCOSE BLD-MCNC: 75 MG/DL (ref 75–110)
GLUCOSE BLD-MCNC: 85 MG/DL (ref 70–99)
INR BLD: 1.2
POTASSIUM SERPL-SCNC: 5.2 MMOL/L (ref 3.7–5.3)
PROCALCITONIN: >100 NG/ML
PROTHROMBIN TIME: 15.3 SEC (ref 11.5–14.2)
SODIUM BLD-SCNC: 132 MMOL/L (ref 135–144)
TOTAL PROTEIN: 7 G/DL (ref 6.4–8.3)

## 2022-02-27 PROCEDURE — 6370000000 HC RX 637 (ALT 250 FOR IP): Performed by: INTERNAL MEDICINE

## 2022-02-27 PROCEDURE — 80053 COMPREHEN METABOLIC PANEL: CPT

## 2022-02-27 PROCEDURE — 99222 1ST HOSP IP/OBS MODERATE 55: CPT | Performed by: INTERNAL MEDICINE

## 2022-02-27 PROCEDURE — 6370000000 HC RX 637 (ALT 250 FOR IP): Performed by: NURSE PRACTITIONER

## 2022-02-27 PROCEDURE — 82947 ASSAY GLUCOSE BLOOD QUANT: CPT

## 2022-02-27 PROCEDURE — 85610 PROTHROMBIN TIME: CPT

## 2022-02-27 PROCEDURE — 6360000002 HC RX W HCPCS: Performed by: INTERNAL MEDICINE

## 2022-02-27 PROCEDURE — 99233 SBSQ HOSP IP/OBS HIGH 50: CPT | Performed by: INTERNAL MEDICINE

## 2022-02-27 PROCEDURE — 97535 SELF CARE MNGMENT TRAINING: CPT

## 2022-02-27 PROCEDURE — 36415 COLL VENOUS BLD VENIPUNCTURE: CPT

## 2022-02-27 PROCEDURE — 2580000003 HC RX 258: Performed by: INTERNAL MEDICINE

## 2022-02-27 PROCEDURE — 84145 PROCALCITONIN (PCT): CPT

## 2022-02-27 PROCEDURE — 2580000003 HC RX 258: Performed by: NURSE PRACTITIONER

## 2022-02-27 PROCEDURE — 97166 OT EVAL MOD COMPLEX 45 MIN: CPT

## 2022-02-27 PROCEDURE — 2060000000 HC ICU INTERMEDIATE R&B

## 2022-02-27 RX ORDER — HEPARIN SODIUM 5000 [USP'U]/ML
5000 INJECTION, SOLUTION INTRAVENOUS; SUBCUTANEOUS EVERY 8 HOURS SCHEDULED
Status: DISCONTINUED | OUTPATIENT
Start: 2022-02-27 | End: 2022-03-04 | Stop reason: HOSPADM

## 2022-02-27 RX ORDER — ONDANSETRON 2 MG/ML
4 INJECTION INTRAMUSCULAR; INTRAVENOUS EVERY 6 HOURS PRN
Status: DISCONTINUED | OUTPATIENT
Start: 2022-02-27 | End: 2022-03-04 | Stop reason: HOSPADM

## 2022-02-27 RX ADMIN — LANTHANUM CARBONATE 1000 MG: 500 TABLET, CHEWABLE ORAL at 13:29

## 2022-02-27 RX ADMIN — CEFAZOLIN SODIUM 1000 MG: 1 INJECTION, POWDER, FOR SOLUTION INTRAMUSCULAR; INTRAVENOUS at 18:06

## 2022-02-27 RX ADMIN — ONDANSETRON 4 MG: 2 INJECTION INTRAMUSCULAR; INTRAVENOUS at 18:38

## 2022-02-27 RX ADMIN — ASPIRIN 81 MG CHEWABLE TABLET 81 MG: 81 TABLET CHEWABLE at 08:36

## 2022-02-27 RX ADMIN — MIDODRINE HYDROCHLORIDE 10 MG: 10 TABLET ORAL at 08:36

## 2022-02-27 RX ADMIN — ACETAMINOPHEN 650 MG: 325 TABLET ORAL at 05:56

## 2022-02-27 RX ADMIN — Medication 1 TABLET: at 08:36

## 2022-02-27 RX ADMIN — SODIUM CHLORIDE, PRESERVATIVE FREE 10 ML: 5 INJECTION INTRAVENOUS at 21:19

## 2022-02-27 RX ADMIN — LANTHANUM CARBONATE 1000 MG: 500 TABLET, CHEWABLE ORAL at 08:38

## 2022-02-27 RX ADMIN — MIDODRINE HYDROCHLORIDE 10 MG: 10 TABLET ORAL at 13:29

## 2022-02-27 RX ADMIN — HEPARIN SODIUM 5000 UNITS: 5000 INJECTION INTRAVENOUS; SUBCUTANEOUS at 13:29

## 2022-02-27 RX ADMIN — LANTHANUM CARBONATE 1000 MG: 500 TABLET, CHEWABLE ORAL at 17:58

## 2022-02-27 ASSESSMENT — PAIN SCALES - GENERAL
PAINLEVEL_OUTOF10: 0
PAINLEVEL_OUTOF10: 3

## 2022-02-27 NOTE — PLAN OF CARE
Problem: Falls - Risk of:  Goal: Will remain free from falls  Description: Will remain free from falls, chair and bed alarm activated  Outcome: Ongoing     Problem: Infection, Septic Shock:  Goal: Will show no infection signs and symptoms  Description: Will show no infection signs and symptoms, pt's vital signs within normal limits  Outcome: Ongoing     Problem: Venous Thromboembolism:  Goal: Will show no signs or symptoms of venous thromboembolism  Description: Will show no signs or symptoms of venous thromboembolism, pt is on anticoag medication   Outcome: Ongoing

## 2022-02-27 NOTE — PROGRESS NOTES
Occupational Therapy  Initial Assessment    DATE: 2022    NAME: Seven Humphreys  MRN: [id number]   : 1960    Chief Complaint   Patient presents with    Fever     Past Medical History:   Diagnosis Date    A-V fistula ([facility]     lt arm    AICD (automatic cardioverter/defibrillator) present     Asthma     Atrial fibrillation ([facility]     Blood transfusion reaction     CAD (coronary artery disease)     dr Vero Wetzel cardiologist recently aicd check /clearance 4/15    CHF (congestive heart failure) ([facility]     CHF (congestive heart failure) ([facility]     COPD (chronic obstructive pulmonary disease) ([facility]     CRF (chronic renal failure)     Diabetes mellitus ([facility]     IDDM    Dialysis patient ([facility]     GERD (gastroesophageal reflux disease)     Hemodialysis patient ([facility]     laskey tues-thurs-sat    Hypertension     treated w/ meds    Obesity      Past Surgical History:   Procedure Laterality Date    CARDIAC DEFIBRILLATOR PLACEMENT      PACEMAKER PLACEMENT      VASCULAR SURGERY      L avf        22 [id number]   Restrictions/Precautions   Restrictions/Precautions Fall Risk;General Precautions   Required Braces or Orthoses? No   Vision   [facility]   Hearing   Hearing [facility]   General   Patient assessed for rehabilitation services? Yes   Family / Caregiver Present No   Referring Practitioner Alex   Diagnosis SOB   Pain Assessment   Patient Currently in Pain Denies   Social/Functional History   Lives With Alone   Type of [address] One level; Laundry in basement   Home Access Stairs to enter without rails   Entrance Stairs - Number of Steps 6   Bathroom Shower/Tub Tub/Shower unit   Bathroom Toilet Standard   Bathroom Equipment Grab bars in shower;Grab bars around toilet   R [address] Responsibilities Yes   Ambulation Assistance Independent  (cane PRN)   Transfer Assistance Independent   Active  No   Orientation   Overall Orientation Status WFL   ADL   Feeding Independent   Grooming Stand by assistance   UE Bathing Stand by assistance   LE Bathing Stand by assistance   UE Dressing Stand by assistance   LE Dressing Stand by assistance   Toileting Minimal assistance  (bed linens partially soiled)   Coordination   Movements Are Fluid And Coordinated Yes   Functional Activity Tolerance   Functional Activity Tolerance Tolerates 10 - 20 min exercise with multiple rests   Balance   Sitting Balance Independent   Standing Balance Stand by assistance   Functional Mobility   Functional - Mobility Device No device  (furniture/wall walking with observed self corrected LOB X 2-)   Activity To/from bathroom   Assist Level Stand by assistance   Transfers   Stand Step Transfers Stand by assistance   Sit to stand Stand by assistance   Stand to sit Stand by assistance   Vision - Basic Assessment   Prior Vision No visual deficits   Visual History No significant visual history   Patient Visual Report No visual complaint reported. Cognition   Overall Cognitive Status WNL   Perception   Overall Perceptual Status WFL   Sensation   Overall Sensation Status WNL   Type of ROM/Therapeutic Exercise   Type of ROM/Therapeutic Exercise AROM   Comment BUE grossly intact   LUE Strength   Gross LUE Strength WFL   L Hand General 4+/5   RUE Strength   Gross RUE Strength WFL   R Hand General 4+/5   Short term goals   Time Frame for Short term goals Within 10 sessions, pt will. ..    Short term goal 1 demo mod I and safety with transfers and functional mobility using DME PRN   Short term goal 2 demo mod I and safety with bathing, dressing, toileting, and grooming at    Short term goal 3 improve activity tolerance to engage in 20-30 minutes of dynamic seated/standing activity   Short term goal 4 demo G understanding and implementation of EC/WC, safety awareness, AE, and DME PRN during engagement in ADLs/IADLs   Short term goal 5 demo mod I and safety with IADLs with rest breaks PRN   Assessment   Performance deficits / Impairments Decreased functional mobility ; Decreased ADL status; Decreased safe awareness;Decreased endurance;Decreased balance;Decreased high-level IADLs   Prognosis Good   Decision Making Medium Complexity   REQUIRES OT FOLLOW UP Yes   Discharge Recommendations Home with assist PRN   Activity Tolerance   Activity Tolerance Patient Tolerated treatment well   Safety Devices   Safety Devices in place Yes   Type of devices Left in chair;Call light within reach   Plan   Times per week 3-5   Current Treatment Recommendations Balance Training;Functional Mobility Training; Endurance Training; Safety Education & Training;Patient/Caregiver Education & Training;Equipment Evaluation, Education, & procurement;Self-Care / ADL   Equipment Recommendations   Equipment Needed Yes   [employer] Rolling   Shower Chair X     Initiated session with pt supine in bed with student RN in room. Pt supine to sit, doffed/donned gown, sit to stand, functional mobility to/from bathroom with furniture/wall walking d/t multiple self corrected LOB, to chair at bedside, stand to sit, demo access to feet for LB dressing, tolerated ROM and MMT, and initiated self feeding. Concluded session with pt seated in chair engaging in self feeding with call light in reach. RN approved pt for OOB activity this date and pt agreeable.     Time in: [address] [place] [address]  Time out: 0859  Total time: [address] OTR/L

## 2022-02-27 NOTE — FLOWSHEET NOTE
Patient was passive, states no major needs or prayers. States well. Not a long visit. States good family support.    leaves prayer card for possible follow up.     02/27/22 1424   Encounter Summary   Services provided to: Patient   Referral/Consult From: [facility] Family members   Continue Visiting   (2-27-22)   Complexity of Encounter Low   Length of Encounter 15 minutes   Spiritual Assessment Completed Yes   Routine   Type Initial   Assessment Passive   Intervention Explored feelings, thoughts, concerns   Outcome Refused/declined

## 2022-02-27 NOTE — CONSULTS
Renal Consult Note    Patient :  Stacey Bell; 62 y.o. MRN# [id number]  Location:  [facility]/[id number]  Attending:  Eva Callejas DO  Admit Date:  2/26/2022   Hospital Day: 1    Reason for Consult:     Asked by Dr Eva Callejas DO to see for WALLY/Elevated Creatinine. History Obtained From:     patient    HD Access:     previous Rt groin permacath    [facility]    Nephrologist:     Dr Abraham Figueroa    Dry Weight:     ?    Admission Weight:     129 Kgs    History of Present Illness: Stacey Bell; 62 y.o. male with past medical history as mentioned below presented to the hospital with the chief complaint of generalized weakness, fatigue, diarrhea and headaches for a day or so prior to presentation. On the day of dialysis is found to have a fever of 103 °F.  Dialysis initiated thereafter entering his treatment symptoms became worse. He also has been shortness of breath difficulty on exertion within 2 hours into his treatment. Treatment was stopped he was sent into the ER for evaluation. On presentation he was normotensive,, white count of 15.4. Blood cultures drawn on admission came back positive for staph aureus. He does admit to seeing some drainage from his dialysis catheter site although it is unclear if this was purulent. He tells me the catheter was placed about 2 weeks ago his previous catheter had significant bleeding from the site. Denies any previous history of dialysis catheter infections. He has been on dialysis for 5 years. Nursing staff tells me the dialysis catheter site has been clean in the hospital.  He does not have any other access for dialysis, has run out of access in the upper extremity. Past History/Allergies? 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     [facility] ([facility]) ([facility]     COPD (chronic obstructive pulmonary disease) ([facility]     CRF (chronic renal failure)     Diabetes mellitus ([facility]     IDDM    Dialysis patient ([facility]     GERD (gastroesophageal reflux disease)     Hemodialysis patient ([facility]     laskey tues-thurs-sat    Hypertension     treated w/ meds    Obesity        Allergies   Allergen Reactions    Spironolactone Itching and Other (See Comments)       Social History     Socioeconomic History    Marital status: Legally      Spouse name: Not on file    Number of children: Not on file    Years of education: Not on file    Highest education level: Not on file   Occupational History    Not on file   Tobacco Use    Smoking status: Never Smoker    Smokeless tobacco: Never Used   Substance and Sexual Activity    Alcohol use: No    Drug use: No    Sexual activity: Not on file   Other Topics Concern    Not on file   Social History Narrative    Not on file     Social Determinants of Health     Financial Resource Strain:     Difficulty of Paying Living Expenses: Not on file   Food Insecurity:     Worried About Running Out of Food in the Last Year: Not on file    Hali of Food in the Last Year: Not on file   Transportation Needs:     Lack of Transportation (Medical): Not on file    Lack of Transportation (Non-Medical):  Not on file   Physical Activity:     Days of Exercise per Week: Not on file    Minutes of Exercise per Session: Not on file   Stress:     Feeling of Stress : Not on file   Social Connections:     Frequency of Communication with Friends and Family: Not on file    Frequency of Social Gatherings with Friends and Family: Not on file    Attends Congregational Services: Not on file    Active Member of Clubs or Organizations: Not on file    Attends Club or Organization Meetings: Not on file    Marital Status: Not on file   Intimate Partner Violence:     Fear of Current or Ex-Partner: Not on file    Emotionally Abused: Not on file    Physically Abused: Not on file    Sexually Abused: Not on file   Housing Stability:     Unable to Pay for Housing in the Last Year: Not on file    Number of Patti in the Last Year: Not on file    Unstable Housing in the Last Year: Not on file       Family History:        Family History   Adopted: Yes       Outpatient Medications:     Medications Prior to Admission: methocarbamol (ROBAXIN) 750 MG tablet, Take 750 mg by mouth 3 times daily  lanthanum (FOSRENOL) 1000 MG chewable tablet, CHEW AND SWALLOW 1 TABLET THREE TIMES A DAY WITH MEALS  metoprolol succinate (TOPROL XL) 25 MG extended release tablet, TAKE ONE TABLET BY MOUTH EVERY DAY  aspirin 81 MG chewable tablet, Take 81 mg by mouth daily  midodrine (PROAMATINE) 5 MG tablet, Take 10 mg by mouth as needed  cinacalcet (SENSIPAR) 60 MG tablet, 60 mg every Tuesday Thursday and Saturday with dialysis  [DISCONTINUED] ceFAZolin (ANCEF) 1 g injection, 3 gm with each dialysis session. End date of 10/30/20  ELIQUIS 5 MG TABS tablet, Take 5 mg by mouth 2 times daily   B Complex-C-Folic Acid (CHRISSIE-GRACE) TABS, TAKE 1 TABLET BY MOUTH EVERY EVENING    Current Medications:     Scheduled Meds:    heparin (porcine)  5,000 Units SubCUTAneous 3 times per day    ceFAZolin  1,000 mg IntraVENous Daily    aspirin  81 mg Oral Daily    chrissie-grace  1 tablet Oral Daily    lanthanum  1,000 mg Oral TID WC    midodrine  10 mg Oral TID WC    metoprolol succinate  25 mg Oral Daily    sodium chloride flush  5-40 mL IntraVENous 2 times per day     Continuous Infusions:    sodium chloride       PRN Meds:  perflutren lipid microspheres, sodium chloride flush, sodium chloride, acetaminophen **OR** acetaminophen    Review of Systems:     Constitutional: No fever, no chills, no lethargy, no weakness.   HEENT:  No headache, otalgia, itchy eyes, nasal discharge or sore throat. Cardiac:  No chest pain, dyspnea, orthopnea or PND. Chest:  No cough, phlegm or wheezing. Abdomen:  No abdominal pain, nausea or vomiting. Neuro:  No focal weakness, abnormal movements orseizure like activity. Skin:   No rashes, no itching. :   No hematuria, no pyuria, no dysuria, no flank pain. Extremities:  No swelling or joint pains. ROS was otherwise negative except as mentioned in the [address] Input/Output:     I/O last 3 completed shifts: In: 10 [I.V.:10]  Out: -   Patient Vitals for the past 96 hrs (Last 3 readings):   Weight   22 1715 285 lb (129.3 kg)       Vital Signs:   Temperature:  Temp: 97.9 °F (36.6 °C)  TMax:   Temp (24hrs), Av.3 °F (37.4 °C), Min:97.9 °F (36.6 °C), Max:102.9 °F (39.4 °C)    Respirations:  Resp: 18  Pulse:   Pulse: 66  BP:    BP: 138/84  BP Range: Systolic (40NRE), NFO:523 , Min:92 , JCK:663       Diastolic (88SEP), HGL:66, Min:49, Max:84      Physical Examination:     General:  AAO x 3, speaking in full sentences, no accessory muscle use. HEENT: Atraumatic, normocephalic, no throat congestion, moist mucosa. Eyes:   Pupils equal, round and reactive to light, EOMI. Neck:   No JVD, no thyromegaly, no lymphadenopathy. Chest:  Bilateral vesicular breath sounds, no rales or wheezes. Cardiac:  S1 S2 RR, no murmurs, gallops or rubs, JVP not raised. Abdomen: Soft, non-tender, no masses or organomegaly, BS audible. :   No suprapubic or flank tenderness. Neuro:  AAO x 3, No FND. SKIN:  No rashes, good skin turgor. Extremities:  No edema, palpable peripheral pulses, no calf tenderness.   Dialysis catheter site appears clean  Labs:       Recent Labs     22  1000   WBC 15.4*   RBC 3.24*   HGB 10.0*   HCT 30.8*   MCV 95.1   MCH 30.9   MCHC 32.5   RDW 13.9   *   MPV 11.7      BMP:   Recent Labs     22  1000 22  0614   * 132*   K 4.5 5.2   CL 90* 92*   CO2 27 26   BUN 25* 39*   CREATININE 7.91* 10.83*   GLUCOSE 98 85 CALCIUM 9.5 9.6      Phosphorus:   No results for input(s): PHOS in the last 72 hours. Magnesium:  No results for input(s): MG in the last 72 hours. Albumin:    Recent Labs     02/26/22  1000 02/27/22  0614   LABALBU 4.1 3.4*     BNP:      Lab Results   Component Value Date    BNP NOT REPORTED 08/21/2014     PTH:   No results found for: IPTH  Blood cultures:  No results found for: BC    Urinalysis/Chemistries:    No results found for: [place], PHUR, LABCAST, [address], RBCUA, MUCUS, TRICHOMONAS, YEAST, BACTERIA, CLARITYU, SPECGRAV, LEUKOCYTESUR, UROBILINOGEN, BILIRUBINUR, BLOODU, GLUCOSEU, [address], AMORPHOUS    Radiology:     CXR:     Assessment:     1. ESRD on Hemodialysis. His regular HD days are Tuesday Thursday Saturday at [facility] hemodialysis facility using right groin tunneled catheter under [place]. His dry weight is not known. Admitted with 129 kg  2. Anemia of chronic disease stable  3. Secondary hyperparathyroidism stable  4. Hypertension stable pressures  5. Staph aureus sepsis source likely dialysis catheter in, on Ancef  6. Poor vascular access dialysis catheter site appears clean    Plan:   1. HD as per schedule. 2. Strict Input and Output, Daily weigh and document in the chart. 3. Low Potassium, Low phosphorus and low salt diet. Fluids to be restricted to 1500ml/day. 4. IV Aranesp/Epogen for anemia of chronic disease with HD weekly. 5. IV Zemplar per protocol for secondary hyperparathyroidism with HD thrice a week. 6.  Ancef per ID  7. Next dialysis Tuesday  8. We will arrange for exchange of dialysis catheter over guidewire tomorrow. Unfortunately with poor access leaving catheter out would not be an option. 9.  Agree with echocardiogram     Nutrition   Please ensure that patient is on a renal diet/TF. Thank you for the consultation. Please do not hesitate to contact us for any further questions/concerns. We will continue to follow along with you.

## 2022-02-27 NOTE — PROGRESS NOTES
[facility]  Office: [address], DO, Kali Lind, DO, Ghada Joseph, DO, Aniket Horse Blood, DO, Candida Pitts MD, Chantelle Cast MD, Shana Amador MD, Suma Stearns MD, Pio England MD, Tamika Douglass MD, Enoc Neves MD, Dheeraj Lee, DO, Karli Lai, DO, Cordelia Barthel, MD,  Gilbert Hull, DO, Brina Harris MD, No Caballero MD, Shahnaz Lee MD, Chacho Maxwell MD, Nickolas Brown MD, Rosangela Chowdhury, CNP, Burton Hyatt, CNP, Tevin Kennedy, CNP, Chetna Rendon, CNS, Saima Johnson, CNP, Olu Garcia, CNP, Daysi Cano, CNP, Lily Vásquez, CNP, Mary Lee, CNP, Love Velasquez PA-C, Killian Harrell, [facility], Kerwin Laureano, [facility], Clarke Bennett, CNP, Jonn Boothe, CNP, Vaelry Bentley, CNP, Sallie Lozoya, CNP, Chuck Victoria, CNP, Gina Dawson, [facility]    Progress Note    2/27/2022    12:51 PM    Name:   Mahesh Willis  MRN:     [id number]     Acct:      [de-identified]   Room:   [address] [place] [address] Day:  1  Admit Date:  2/26/2022  9:13 AM    PCP:   No primary care provider on file. Code Status:  Full Code    Subjective:     C/C:   Chief Complaint   Patient presents with    Fever     Interval History Status: not changed. Feels ok  Denies cp/sob/n/v  Still having some watery diarrhea    Had fever of 102.9 this am    Brief History:     Mahesh Willis is a 62 y.o. Non- / non  male who presents with Fever   and is admitted to the hospital for the management of Sepsis ([facility]    This 62 yom presents from dialysis center with fever to 103. He was unaware he had a fever and his only symptoms have been fatigue (chronic), sob/looney, and 4 episodes of diarrhea yesterday. He also noticed a little reddish drainage from HD cath in groin.   Denies c/s/s/cough/n/v/abd pain/cp/palpitations    Review of Systems:     Constitutional:  negative for chills, sweats  Respiratory:  negative for cough, dyspnea on exertion, shortness of breath, wheezing  Cardiovascular:  negative for chest pain, chest pressure/discomfort, lower extremity edema, palpitations  Gastrointestinal:  negative for abdominal pain, constipation, nausea, vomiting  Neurological:  negative for dizziness, headache    Medications: Allergies: Allergies   Allergen Reactions    Spironolactone Itching and Other (See Comments)       Current Meds:   Scheduled Meds:    aspirin  81 mg Oral Daily    chrissie-pito  1 tablet Oral Daily    apixaban  5 mg Oral BID    lanthanum  1,000 mg Oral TID WC    midodrine  10 mg Oral TID WC    metoprolol succinate  25 mg Oral Daily    sodium chloride flush  5-40 mL IntraVENous 2 times per day    cefepime  1,000 mg IntraVENous Q24H     Continuous Infusions:    sodium chloride      sodium chloride 50 mL/hr at 22     PRN Meds: sodium chloride flush, sodium chloride, acetaminophen **OR** acetaminophen    Data:     Past Medical History:   has a past medical history of A-V fistula ([facility] AICD (automatic cardioverter/defibrillator) present, Asthma, Atrial fibrillation ([facility] Blood transfusion reaction, CAD (coronary artery disease), CHF (congestive [facility] failure) ([facility] COPD (chronic obstructive pulmonary disease) ([facility] CRF (chronic renal failure), Diabetes mellitus ([facility] Dialysis patient ([facility] GERD (gastroesophageal reflux disease), Hemodialysis patient ([facility] Hypertension, and Obesity. Social History:   reports that he has never smoked. He has never used smokeless tobacco. He reports that he does not drink alcohol and does not use drugs. Family History:   Family History   Adopted: Yes       Vitals:  /70   Pulse 70   Temp 98.4 °F (36.9 °C) (Oral)   Resp 16   Ht 6' 1\" (1.854 m)   Wt 285 lb (129.3 kg)   SpO2 99%   BMI 37.60 kg/m²   Temp (24hrs), Av.5 °F (37.5 °C), Min:98.4 °F (36.9 °C), Max:102.9 °F (39.4 °C)    No results for input(s): POCGLU in the last 72 hours.     I/O (24Hr): Intake/Output Summary (Last 24 hours) at 2/27/2022 1251  Last data filed at 2/26/2022 2111  Gross per 24 hour   Intake 10 ml   Output --   Net 10 ml       Labs:  Hematology:  Recent Labs     02/26/22  1000 02/27/22  0614   WBC 15.4*  --    RBC 3.24*  --    HGB 10.0*  --    HCT 30.8*  --    MCV 95.1  --    MCH 30.9  --    MCHC 32.5  --    RDW 13.9  --    *  --    MPV 11.7  --    INR 1.2 1.2     Chemistry:  Recent Labs     02/26/22  1000 02/26/22  1225 02/26/22  1538 02/27/22  0614   *  --   --  132*   K 4.5  --   --  5.2   CL 90*  --   --  92*   CO2 27  --   --  26   GLUCOSE 98  --   --  85   BUN 25*  --   --  39*   CREATININE 7.91*  --   --  10.83*   ANIONGAP 16  --   --  14   LABGLOM 7*  --   --  5*   GFRAA 8*  --   --  6*   CALCIUM 9.5  --   --  9.6   PROBNP 2,319*  --   --   --    TROPHS 146* 153* 163*  --      Recent Labs     02/26/22  1000 02/26/22  1225 02/27/22  0614   PROT 8.4*  --  7.0   LABALBU 4.1  --  3.4*   AST 16  --  28   ALT 13  --  16   LDH  --  189  --    ALKPHOS 110  --  79   BILITOT 0.77  --  0.41     ABG:  Lab Results   Component Value Date    POCPH 7.43 05/22/2015    POCPCO2 42 05/22/2015    POCPO2 110 05/22/2015    POCHCO3 27.8 05/22/2015    NBEA NOT REPORTED 05/22/2015    PBEA 3 05/22/2015    MMH4FBS 29 05/22/2015    NWUZ0WQD 98 05/22/2015    FIO2 30.0 05/22/2015     Lab Results   Component Value Date/Time    SPECIAL RAC 12ML 02/26/2022 10:00 AM     Lab Results   Component Value Date/Time    CULTURE POSITIVE Blood Culture 02/26/2022 10:16 AM    CULTURE  02/26/2022 10:16 AM     DIRECT GRAM STAIN FROM BOTTLE: GRAM POSITIVE COCCI IN CLUSTERS    CULTURE  02/26/2022 10:16 AM     (NOTE) Direct Gram Stain from bottle result called to and read back by: HODAN Cadet on 2/27/2022 at 00:45 AND TO HORTENCIA PORTER 2-27-22 AT 4618       Radiology:  CT HEAD WO CONTRAST    Result Date: 2/26/2022  No acute intracranial abnormality.      XR CHEST 1 VIEW    Result Date: 2/26/2022  No acute findings. CT CHEST ABDOMEN PELVIS WO CONTRAST    Result Date: 2/26/2022  1. No definite evidence for acute infective or inflammatory process. 2. A small focal area of pleural-parenchymal density at the right costophrenic angle in the lower lobe most suggestive of subsegmental atelectasis. Pneumonia considered much less likely. 3. Interval development of innumerable bilateral renal cysts with decrease in renal parenchymal tissue compatible with chronic renal parenchymal disease. 4. No abnormal fluid collections. 5. Right femoral vein central line terminating distally in the IVC. Physical Examination:        General appearance:  alert, cooperative and no distress  Mental Status:  oriented to person, place and time and normal affect  Lungs:  clear to auscultation bilaterally, normal effort  Heart:  regular rate and rhythm, no murmur  Abdomen:  soft, nontender, nondistended, normal bowel sounds, no masses, hepatomegaly, splenomegaly  Extremities:  no edema, redness, tenderness in the calves  Skin:  no gross lesions, rashes, induration    Assessment:        Hospital Problems           Last Modified POA    * (Principal) Sepsis due to methicillin susceptible Staphylococcus aureus (MSSA) without acute organ dysfunction ([address] 2/27/2022 Yes    Biventricular CHF (congestive heart failure) (HCC) with icd chronic  2/26/2022 Yes    Paroxysmal atrial fibrillation ([address] 2/26/2022 Yes    Overview Signed 2/3/2014  3:55 PM by Ace LUKE DO     Chads 2 score. On ASA.  Pt has refused coumadin therapy in the past.          Controlled type 2 diabetes mellitus with chronic kidney disease on chronic dialysis, without long-term current use of insulin ([address] 2/26/2022 Yes    ESRD (end stage renal disease) on dialysis ([address] 2/26/2022 Yes    Anemia of chronic disease 2/26/2022 Yes    CAD (coronary artery disease) 2/26/2022 Yes    Overview Signed 10/18/2020  8:28 AM by MD dr Micaela Perales cardiologist [facility]               Plan:        1. ID eval  2. Cont cefepime for now, should be able to more streamline antibiotics-probably with dialysis  3. Dc iv vanco-not needed  4. Check 2d echo to assess for valvular infection/icd infection  5. Stop ivf  6. Dc eliquis-he says he no longer takes this and is refusing it. Will start subq heparin for dvt prophylaxis  7. Hold parameters on proamitine  8.  D/w pamela YARBROUGH Blood, DO  2/27/2022  12:51 PM

## 2022-02-27 NOTE — CONSULTS
[facility]  Initial Consult Note  Date: 2/27/2022    Hospital day :1     Impression:   1. MSSA sepsis 2 of 2 sets and the patient to has recently had tunneled hemodialysis catheter issues with recent exchange-concern is for a total catheter associated infection, endocarditis, AICD associated endocarditis. 2. End-stage renal disease on hemodialysis  3. CAD with ischemic cardiomyopathy status post AICD  4. Atrial fibrillation on anticoagulation  5. Diabetes mellitus type 2  6. Obesity    Recommendations   · I will switch the antimicrobial therapy to cefazolin. · Ideally the patient would need a tunneled hemodialysis catheter removed but it would seem that the patient has access issues and the issue of tunneled hemodialysis catheter removal will need to be discussed with the vascular surgery team.  · 2D echocardiogram to work-up for endocarditis. · While the patient has diarrhea I doubt that this is related to the current infection nor do I think that the patient has any type of colitis. · I will follow his progress and adjust therapy accordingly    Chief complaint/reason for consultation:   MSSA sepsis    History of Present Illness: Todd Wakefield is a 62y.o.-year-old male who was initially admitted on 2/26/2022. Oliver King has a history of end-stage renal disease and he has been on hemodialysis for about 5 years now. He does reports that about 2 weeks ago he did have his right-sided femoral tunneled hemodialysis catheter changed because he was having some bleeding issues for close to 2 weeks at that catheter site. The patient was in his usual state of health until Friday he reports that he has some diarrhea about 4 loose bowel movements, headache and by Saturday morning he was feeling kind of tired when he went to hemodialysis he was noted to have a high fever. He did not report any subjective fevers or chills. No cough or shortness of shortness of breath. No chest pains or palpitations. No abdominal pain. He did not have any bowel movements on Saturday despite having the diarrhea on Friday and he reports having two bowel movements today that were loose. The patient does have some vascular issues is followed by Dr. Gerardo Truong at the [facility] and he tells me he was scheduled for some surgery this week. The patient was admitted and started on antimicrobial therapy and blood cultures done in the emergency room have come back 2 out of 2 sets with Staph aureus. I was asked to evaluate and help with antibiotic choice. I have personally reviewed the past medical history, past surgical history, medications, social history, and family history, and I have updated the database accordingly.   Past Medical History:     Past Medical History:   Diagnosis Date    A-V fistula ([facility]     lt arm    AICD (automatic cardioverter/defibrillator) present 2006    Asthma     Atrial fibrillation (HCC)     Blood transfusion reaction     CAD (coronary artery disease)     dr Ina Chopra cardiologist recently aicd check /clearance 4/15    [facility]estive [facility] failure) [facility]     CHF (congestive heart failure) ([facility]     COPD (chronic obstructive pulmonary disease) ([facility]     CRF (chronic renal failure)     Diabetes mellitus ([facility]     IDDM    Dialysis patient ([facility]     GERD (gastroesophageal reflux disease)     Hemodialysis patient ([facility]     laskey tues-thurs-sat    Hypertension     treated w/ meds    Obesity      Past Surgical  History:     Past Surgical History:   Procedure Laterality Date    CARDIAC DEFIBRILLATOR PLACEMENT  2006    PACEMAKER PLACEMENT      VASCULAR SURGERY      L avf     Medications:      heparin (porcine)  5,000 Units SubCUTAneous 3 times per day    aspirin  81 mg Oral Daily    chrissie-pito  1 tablet Oral Daily    lanthanum  1,000 mg Oral TID WC    midodrine  10 mg Oral TID WC    metoprolol succinate  25 mg Oral Daily    sodium chloride flush  5-40 mL IntraVENous 2 times per day    cefepime  1,000 mg IntraVENous Q24H     Social History:     Social History     Socioeconomic History    Marital status: Legally      Spouse name: Not on file    Number of children: Not on file    Years of education: Not on file    Highest education level: Not on file   Occupational History    Not on file   Tobacco Use    Smoking status: Never Smoker    Smokeless tobacco: Never Used   Substance and Sexual Activity    Alcohol use: No    Drug use: No    Sexual activity: Not on file   Other Topics Concern    Not on file   Social History Narrative    Not on file     Social Determinants of Health     Financial Resource Strain:     Difficulty of Paying Living Expenses: Not on file   Food Insecurity:     Worried About Running Out of Food in the Last Year: Not on file    Hali of Food in the Last Year: Not on file   Transportation Needs:     Lack of Transportation (Medical): Not on file    Lack of Transportation (Non-Medical): Not on file   Physical Activity:     Days of Exercise per Week: Not on file    Minutes of Exercise per Session: Not on file   Stress:     Feeling of Stress : Not on file   Social Connections:     Frequency of Communication with Friends and Family: Not on file    Frequency of Social Gatherings with Friends and Family: Not on file    Attends Spiritism Services: Not on file    Active Member of [address] [place] [address] [employer] or Organizations: Not on file    Attends Club or Organization Meetings: Not on file    Marital Status: Not on file   Intimate Partner Violence:     Fear of Current or Ex-Partner: Not on file    Emotionally Abused: Not on file    Physically Abused: Not on file    Sexually Abused: Not on file   Housing Stability:     Unable to Pay for Housing in the Last Year: Not on file    Number of [place] in the Last Year: Not on file    Unstable Housing in the Last Year: Not on file     Family History:     Family History   Adopted:  Yes      Allergies: Spironolactone     Review of Systems:   General: No subjective fevers or chills though he had documented fever at dialysis, he did have some generalized malaise/fatigue  Eyes: No double vision or blurry vision. ENT: No sore throat or runny nose. Cardiovascular: No chest pain or palpitations. Lung: No shortness of breath or cough. Abdomen: No abdominal pain nausea vomiting but did have some diarrhea on Friday, no bowel movement Saturday and has had 2 loose bowel movements here today  Genitourinary: No urinary symptoms-the patient does not urinate at all  Musculoskeletal: No muscle aches or pains. Hematologic: No bleeding or bruising. Neurologic: He did have some headaches at home though these have since resolved. Physical Examination :   /70   Pulse 70   Temp 98.4 °F (36.9 °C) (Oral)   Resp 16   Ht 6' 1\" (1.854 m)   Wt 285 lb (129.3 kg)   SpO2 99%   BMI 37.60 kg/m²     Temperature Range: Temp: 98.4 °F (36.9 °C) Temp  Av.5 °F (37.5 °C)  Min: 98.4 °F (36.9 °C)  Max: 102.9 °F (39.4 °C)  General Appearance: Awake, alert, and in no apparent distress  Head: Normocephalic, without obvious abnormality, atraumatic  Eyes: Pupils equal, round, reactive, to light and accommodation; extraocular movements intact; sclera anicteric; conjunctivae pink  ENT: Oropharynx clear, without erythema, exudate, or thrush. Neck: Supple, without lymphadenopathy. Pulmonary/Chest: Clear to auscultation, without wheezes, rales, or rhonchi  Cardiovascular: Regular rate and rhythm without murmurs, rubs, or gallops. Abdomen: Soft, nontender, nondistended. Extremities: There are some old scars from previous fistulas the upper extremities bilaterally, there is a right femoral dialysis catheter in place, and No cyanosis, clubbing, edema, or effusions. Neurologic: No gross sensory or motor deficits. Skin: Warm and dry with no rash.     Medical Decision Making:   I have independently reviewed/ordered the following labs:  CBC with Differential:   Recent Labs     02/26/22  1000   WBC 15.4*   HGB 10.0*   HCT 30.8*   *   LYMPHOPCT 7*   MONOPCT 4     BMP:   Recent Labs     02/26/22  1000 02/27/22  0614   * 132*   K 4.5 5.2   CL 90* 92*   CO2 27 26   BUN 25* 39*   CREATININE 7.91* 10.83*     Hepatic Function Panel:   Recent Labs     02/26/22  1000 02/27/22  0614   PROT 8.4* 7.0   LABALBU 4.1 3.4*   BILITOT 0.77 0.41   ALKPHOS 110 79   ALT 13 16   AST 16 28       Lab Results   Component Value Date    PROCAL >100.00 02/27/2022       Lab Results   Component Value Date    CRP 82.5 09/21/2019     Lab Results   Component Value Date    FERRITIN 265 01/30/2014     Lab Results   Component Value Date    FIBRINOGEN 477 02/26/2022    FIBRINOGEN 476 05/20/2015    FIBRINOGEN 697 04/09/2015     Lab Results   Component Value Date    DDIMER 4.96 05/20/2015    DDIMER 5.23 04/09/2015    DDIMER 1.14 08/22/2014    DDIMER 1.03 08/21/2014     Lab Results   Component Value Date     02/26/2022       Lab Results   Component Value Date    SEDRATE 46 (H) 08/21/2014       Lab Results   Component Value Date    COVID19 Not Detected 02/26/2022    COVID19 Not Detected 10/17/2020     No results found for requested labs within last 30 days. Imaging Studies:   CT HEAD WO CONTRAST    Result Date: 2/26/2022  EXAMINATION: CT OF THE HEAD WITHOUT CONTRAST  2/26/2022 12:51 pm TECHNIQUE: CT of the head was performed without the administration of intravenous contrast. Dose modulation, iterative reconstruction, and/or weight based adjustment of the mA/kV was utilized to reduce the radiation dose to as low as reasonably achievable. COMPARISON: None.  HISTORY: ORDERING SYSTEM PROVIDED HISTORY: APTT >150, sepsis, drowsy TECHNOLOGIST PROVIDED HISTORY: APTT >150, sepsis, drowsy Decision Support Exception - unselect if not a suspected or confirmed emergency medical condition->Emergency Medical Condition (MA) Reason for Exam: sepsis FINDINGS: BRAIN/VENTRICLES: There is no acute intracranial hemorrhage, mass effect or midline shift. No abnormal extra-axial fluid collection. The gray-white differentiation is maintained without evidence of an acute infarct. There is no evidence of hydrocephalus. ORBITS: The visualized portion of the orbits demonstrate no acute abnormality. SINUSES: The visualized paranasal sinuses and mastoid air cells demonstrate no acute abnormality. SOFT TISSUES/SKULL:  No acute abnormality of the visualized skull or soft tissues. No acute intracranial abnormality. XR CHEST 1 VIEW    Result Date: 2/26/2022  EXAMINATION: ONE XRAY VIEW OF THE CHEST 2/26/2022 10:35 am COMPARISON: October 17, 2020 HISTORY: ORDERING SYSTEM PROVIDED HISTORY: SOB TECHNOLOGIST PROVIDED HISTORY: SOB Reason for Exam: Port upright AP CXR - SOB FINDINGS: Lungs overall clear. No pneumothorax or significant pleural effusion. Similar elevated right hemidiaphragm. Cardiac size stable. Mediastinum unremarkable. Vascular stent right subclavian region. Pacer unchanged. No acute osseous abnormality. Telemetry leads overlie the chest.     No acute findings. CT CHEST ABDOMEN PELVIS WO CONTRAST    Result Date: 2/26/2022  EXAMINATION: CT OF THE CHEST, ABDOMEN, AND PELVIS WITHOUT CONTRAST 2/26/2022 12:51 pm TECHNIQUE: CT of the chest, abdomen and pelvis was performed without the administration of intravenous contrast. Multiplanar reformatted images are provided for review. Dose modulation, iterative reconstruction, and/or weight based adjustment of the mA/kV was utilized to reduce the radiation dose to as low as reasonably achievable.  COMPARISON: CT abdomen pelvis 05/25/2015 HISTORY: ORDERING SYSTEM PROVIDED HISTORY: SOB sepsis APTT >150 TECHNOLOGIST PROVIDED HISTORY: SOB sepsis APTT >150 Decision Support Exception - unselect if not a suspected or confirmed emergency medical condition->Emergency Medical Condition (MA) Reason for Exam: fever, evaluate for infection FINDINGS: Chest: Mediastinum: Cardiac size normal.  Pacemaker wires in place. There are scattered calcified mediastinal hilar lymph nodes. Thyroid gland and esophagus grossly normal.  Normal caliber aorta. Lungs/pleura: Several scattered calcified nodules representing granulomas noted. Small right costophrenic angle pleural-parenchymal density likely subsegmental atelectasis. Developing pneumonia much less favored. No convincing evidence for pneumonia. No pleural effusion or pneumothorax. Central airways unremarkable. Soft Tissues/Bones: No acute abnormality of the bones. The superficial soft tissues show no significant abnormalities. Abdomen/Pelvis: Organs: Limited evaluation the absence of IV contrast. Liver, spleen, pancreas, adrenal glands show no significant abnormalities. Gallbladder absent. Renal parenchymal atrophy with innumerable bilateral renal cysts compatible with chronic renal parenchymal disease. Cysts have significantly increased from prior. There are a couple of punctate renal calculi on the left. No hydronephrosis. GI/Bowel: There is limited evaluation due to absence of oral contrast. The stomach shows no focal lesions. Small bowel loops normal in caliber showing no focal abnormalities. Normal appendix. Evaluation of the colon shows no acute process. Pelvis: The urinary bladder is unremarkable. No suspicious pelvic mass. Peritoneum/Retroperitoneum: No free intraperitoneal fluid or significant lymphadenopathy. There is right femoral vein central line terminating in IVC at about level of left renal vein. Bones/Soft Tissues: Degenerative changes in the spine. 1. No definite evidence for acute infective or inflammatory process. 2. A small focal area of pleural-parenchymal density at the right costophrenic angle in the lower lobe most suggestive of subsegmental atelectasis. Pneumonia considered much less likely.  3. Interval development of innumerable bilateral renal cysts with decrease in renal parenchymal tissue compatible with chronic renal parenchymal disease. 4. No abnormal fluid collections. 5. Right femoral vein central line terminating distally in the IVC. Cultures:     Culture, Blood 1 [[id number]] Collected: 02/26/22 1016   Order Status: Completed Specimen: Blood Updated: 02/27/22 0641    Specimen Description . BLOOD 10 ML  RIGHT HAND    Culture POSITIVE Blood Culture     DIRECT GRAM STAIN FROM BOTTLE: GRAM POSITIVE COCCI IN CLUSTERS     (NOTE) Direct Gram Stain from bottle result called to and read back by: HODAN Andrews on 2/27/2022 at 00:45 AND TO [facility] 2-27-22 AT 0640   Culture, Blood 1 [[id number]] Collected: 02/26/22 1000   Order Status: Completed Specimen: Blood Updated: 02/27/22 0639    Specimen Description . BLOOD    Special Requests RAC 12ML    Culture POSITIVE Blood Culture     DIRECT GRAM STAIN FROM BOTTLE: GRAM POSITIVE COCCI IN CLUSTERS     Staphylococcus aureus Detected: mecA/C and MREJ Not Detected Methodology- Polymerase Chain Reaction (PCR)     (NOTE) Direct Gram Stain from bottle and Polymerase Chain Reaction (PCR) results called to and read back by: HODAN Andrews on 2/27/2022 at 00:45 AND TO [facility] 2-27-22 AT 0640   COVID-19, Rapid [[id number]] Collected: 02/26/22 0944   Order Status: Completed Specimen: Nasopharyngeal Swab Updated: 02/26/22 1008    Specimen Description . NASOPHARYNGEAL SWAB    SARS-CoV-2, Rapid Not Detected    Comment:        Rapid NAAT:  The specimen is NEGATIVE for SARS-CoV-2, the novel coronavirus associated with   COVID-19.         The ID NOW COVID-19 assay is designed to detect the virus that causes COVID-19 in patients   with signs and symptoms of infection who are suspected of COVID-19. An individual without symptoms of COVID-19 and who is not shedding SARS-CoV-2 virus would   expect to have a negative (not detected) result in this assay.    Negative results should be treated as presumptive and, if inconsistent with clinical

## 2022-02-27 NOTE — CONSULTS
[facility]   Consult Note             Date:   2/27/2022  Patient name: Judith Beltran  Date of admission:  2/26/2022  9:13 AM  MRN:   [id number]  YOB: 1960    Reason for Admission:  sepsis    Indication for consult. EKG changes. History Obtained From:  patient, electronic medical record    HISTORY OF PRESENT ILLNESS:    Factors, Severity.)    Judith Beltran is a 62 y.o. male who has a past medical history of congestive heart failure, coronary artery disease, COPD, end-stage renal disease on hemodialysis Tuesday Thursday Saturday who presents emergency department from dialysis with fever, shortness of breath. Patient was able to get the part of his dialysis treatment but not full dialysis treatment today. Patient states that shortness of breath started this morning. He has not noticed any increased leg swelling or weight gain in relation to his heart failure. Was unaware that he had a fever. No headache, congestion, runny nose, sore throat. No abdominal pain nausea vomiting or diarrhea. No cough. No longer makes any urine  Cardiology was consulted due to abnormal troponins. He has no chest pain or shortness of breath. He has no history of cad. Past Medical History:   has a past medical history of A-V fistula ([address] AICD (automatic cardioverter/defibrillator) present, Asthma, Atrial fibrillation ([address] Blood transfusion reaction, CAD (coronary artery disease), CHF (congestive heart failure) ([address] CHF (congestive heart failure) ([address] COPD (chronic obstructive pulmonary disease) ([address] CRF (chronic renal failure), Diabetes mellitus ([address] Dialysis patient ([address] GERD (gastroesophageal reflux disease), Hemodialysis patient ([address] Hypertension, and Obesity. Past Surgical History:   has a past surgical history that includes vascular surgery; Cardiac defibrillator placement (2006); and pacemaker placement.      Home Medications:    Prior to Admission medications    Medication Sig Start Date End Date Taking? Authorizing Provider   methocarbamol (ROBAXIN) 750 MG tablet Take 750 mg by mouth 3 times daily   Yes Historical Provider, MD   lanthanum (FOSRENOL) 1000 MG chewable tablet CHEW AND SWALLOW 1 TABLET THREE TIMES A DAY WITH MEALS 7/15/19  Yes Historical Provider, MD   metoprolol succinate (TOPROL XL) 25 MG extended release tablet TAKE ONE TABLET BY MOUTH EVERY DAY 7/3/19  Yes Historical Provider, MD   aspirin 81 MG chewable tablet Take 81 mg by mouth daily   Yes Historical Provider, MD   midodrine (PROAMATINE) 5 MG tablet Take 10 mg by mouth as needed   Yes Historical Provider, MD   cinacalcet (SENSIPAR) 60 MG tablet 60 mg every Tuesday Thursday and Saturday with dialysis 10/21/20   Paige Paiz MD   ELIQUIS 5 MG TABS tablet Take 5 mg by mouth 2 times daily  7/29/19   Historical Provider, MD LOPEZ Complex-C-Folic Acid (LOLA-GRACE) TABS TAKE 1 TABLET BY MOUTH EVERY EVENING 6/5/19   Historical Provider, MD       Allergies:  Spironolactone    Social History:   reports that he has never smoked. He has never used smokeless tobacco. He reports that he does not drink alcohol and does not use drugs. Family History: family history is not on file. He was adopted. REVIEW OF SYSTEMS:    · Negative except for the above. PHYSICAL EXAM:    Physical Examination:    /84   Pulse 66   Temp 97.9 °F (36.6 °C) (Oral)   Resp 18   Ht 6' 1\" (1.854 m)   Wt 285 lb (129.3 kg)   SpO2 100%   BMI 37.60 kg/m²    Constitutional and General Appearance: alert, cooperative, no distress and appears stated age  HEENT: PERRL, no cervical lymphadenopathy. No masses palpable. Normal oral mucosa  Respiratory:  · Normal excursion and expansion without use of accessory muscles  · Resp Auscultation: Good respiratory effort. No for increased work of breathing.  On auscultation: clear to auscultation bilaterally  Cardiovascular:  · The apical impulse is not displaced  · Heart tones are crisp and normal. regular S1 and S2.  · Jugular venous pulsation Normal  · The carotid upstroke is normal in amplitude and contour without delay or bruit  · Peripheral pulses are symmetrical and full   Abdomen:  · No masses or tenderness  · Bowel sounds present  Extremities:  ·  No Cyanosis or Clubbing  ·  Lower extremity edema: No  ·  Skin: Warm and dry  Neurological:  · Alert and oriented. · Moves all extremities well  · No abnormalities of mood, affect, memory, mentation, or behavior are noted    DATA:    Diagnostics:      EKG:sinus tachycardia with diffuse st depression in the leads. Labs:     CBC:   Recent Labs     02/26/22  1000   WBC 15.4*   HGB 10.0*   HCT 30.8*   *     BMP:   Recent Labs     02/26/22  1000 02/27/22  0614   * 132*   K 4.5 5.2   CO2 27 26   BUN 25* 39*   CREATININE 7.91* 10.83*   LABGLOM 7* 5*   GLUCOSE 98 85     BNP: No results for input(s): BNP in the last 72 hours. PT/INR:   Recent Labs     02/26/22  1000 02/27/22  0614   PROTIME 15.0* 15.3*   INR 1.2 1.2     APTT:  Recent Labs     02/26/22  1000   APTT >150.0*     CARDIAC ENZYMES:No results for input(s): CKTOTAL, CKMB, CKMBINDEX, TROPONINI in the last 72 hours.   FASTING LIPID PANEL:  Lab Results   Component Value Date    HDL 29 02/02/2014    TRIG 230 04/11/2015     LIVER PROFILE:  Recent Labs     02/26/22  1000 02/27/22  0614   AST 16 28   ALT 13 16   LABALBU 4.1 3.4*         IMPRESSION:    Patient Active Problem List   Diagnosis    Biventricular CHF (congestive heart failure) (HCC) with icd chronic     Paroxysmal atrial fibrillation (HCC)    Controlled type 2 diabetes mellitus with chronic kidney disease on chronic dialysis, without long-term current use of insulin ([address]    Pulmonary emboli (HCC) rt lower lobe in 8/15 with negative venous doppler and tt since august 14 - with therapeutic inr     ESRD (end stage renal disease) on dialysis ([address]    Leukocytosis    Tachycardia    S/P cholecystectomy    Cardiomyopathy ([address]    Anemia of chronic disease    Fever and chills    Fever of unknown origin    Strept[facility]emia ([facility]    CAD (coronary artery disease)    Sepsis due to methicillin susceptible Staphylococcus aureus (MSSA) without acute organ dysfunction (HCC)       RECOMMENDATIONS:  1. Dilated cardiomyopathy  2. Abnormal ekg  3. Sepsis  4. Sp ICD implant  5. Chronic kidney disease on dialysis. 6. Abnormal troponins  Patient has abnormal troponins with no signs and symptoms of acs. His troponins have been flat. No indication for further cardiac testing. He has no known cad and his chronic cardiomyopathy is non ischemic. Continue medical therapy. Treat sepsis. Will follow. Discussed with patient and nursing.     Tracey Weinberg MD, MD  [facility] cardiology

## 2022-02-27 NOTE — PLAN OF CARE
Problem: Falls - Risk of:  Goal: Will remain free from falls  Description: Will remain free from falls  2/26/2022 2218 by Nakia Howard RN  Outcome: Ongoing     Problem: Falls - Risk of:  Goal: Absence of physical injury  Description: Absence of physical injury  Outcome: Ongoing     Problem: Cardiac:  Goal: Ability to maintain vital signs within normal range will improve  Description: Ability to maintain vital signs within normal range will improve  2/26/2022 2218 by Nakia Howard RN  Outcome: Ongoing     Problem: Cardiac:  Goal: Cardiovascular alteration will improve  Description: Cardiovascular alteration will improve  Outcome: Ongoing     Problem: Gas Exchange - Impaired:  Goal: Levels of oxygenation will improve  Description: Levels of oxygenation will improve  Outcome: Ongoing     Problem: Infection, Septic Shock:  Goal: Will show no infection signs and symptoms  Description: Will show no infection signs and symptoms  2/26/2022 2218 by Nakia Howard RN  Outcome: Ongoing

## 2022-02-27 NOTE — CARE COORDINATION
Case Management Initial Discharge Plan  Judith Beltran,             Met with:patient to discuss discharge plans. Information verified: address, contacts, phone number, , insurance Yes  Insurance Provider: Milton Mckinnon dual    Emergency Contact/Next of Kin name & number: Beth/cousin   [phone]  Who are involved in patient's support system? self    PCP: No primary care provider on file. Date of last visit: states he has PCP [place] but does not remember her name. Saw her in 2021      Discharge Planning    Living Arrangements:  Alone     Home has 1 stories  7 stairs to climb to get into front door, 0stairs to climb to reach second floor  Location of bedroom/bathroom in home main    Patient able to perform ADL's:Independent    Current Services (outpatient & in home) none  DME equipment: 0  DME provider: 0    Is patient receiving oral anticoagulation therapy? No    If indicated:   Physician managing anticoagulation treatment:   Where does patient obtain lab work for ATC treatment? Potential Assistance Needed:  Home Care    Patient agreeable to home care: Yes  Freedom of choice provided:  yes    Prior SNF/Rehab Placement and Facility: n/a  Agreeable to SNF/Rehab: No  [place] of choice provided: n/a     Evaluation: no    Expected Discharge date:  22    Patient expects to be discharged to: If home: is the family and/or caregiver wiling & able to provide support at home? no  Who will be providing this support? self    Follow Up Appointment: Best Day/ Time: Monday AM    Transportation provider: medical transport/cab  Transportation arrangements needed for discharge: No    Readmission Risk              Risk of Unplanned Readmission:  14             Does patient have a readmission risk score greater than 14?: No  If yes, follow-up appointment must be made within 7 days of discharge.      Goals of Care:       Educated patient on transitional options, provided freedom of choice and are agreeable with plan      Discharge Plan: SOB,Fevers  Patient lives alone in a 1 story home with 7 steps to  Enter. He states, if he needs HC, would like [facility]. [facility]  Pt. May need a walker as well. PT/OT to eval.     Cards, nephro and ID consulted    Continue to follow for any needs.            Electronically signed by Brie Blanchard RN on 2/27/22 at 2:16 PM EST

## 2022-02-28 ENCOUNTER — APPOINTMENT (OUTPATIENT)
Dept: INTERVENTIONAL RADIOLOGY/VASCULAR | Age: 62
DRG: 314 | End: 2022-02-28
Payer: COMMERCIAL

## 2022-02-28 LAB
CULTURE: ABNORMAL
EKG ATRIAL RATE: 132 BPM
EKG P AXIS: 63 DEGREES
EKG P-R INTERVAL: 160 MS
EKG Q-T INTERVAL: 390 MS
EKG QRS DURATION: 84 MS
EKG QTC CALCULATION (BAZETT): 577 MS
EKG T AXIS: 64 DEGREES
EKG VENTRICULAR RATE: 132 BPM
LV EF: 43 %
LVEF MODALITY: NORMAL
Lab: ABNORMAL
SPECIMEN DESCRIPTION: ABNORMAL
SPECIMEN DESCRIPTION: ABNORMAL

## 2022-02-28 PROCEDURE — 97116 GAIT TRAINING THERAPY: CPT

## 2022-02-28 PROCEDURE — 97163 PT EVAL HIGH COMPLEX 45 MIN: CPT

## 2022-02-28 PROCEDURE — 2580000003 HC RX 258: Performed by: INTERNAL MEDICINE

## 2022-02-28 PROCEDURE — 97530 THERAPEUTIC ACTIVITIES: CPT

## 2022-02-28 PROCEDURE — 0JH63XZ INSERTION OF TUNNELED VASCULAR ACCESS DEVICE INTO CHEST SUBCUTANEOUS TISSUE AND FASCIA, PERCUTANEOUS APPROACH: ICD-10-PCS | Performed by: RADIOLOGY

## 2022-02-28 PROCEDURE — 2580000003 HC RX 258: Performed by: NURSE PRACTITIONER

## 2022-02-28 PROCEDURE — 2060000000 HC ICU INTERMEDIATE R&B

## 2022-02-28 PROCEDURE — 6360000002 HC RX W HCPCS: Performed by: INTERNAL MEDICINE

## 2022-02-28 PROCEDURE — 93306 TTE W/DOPPLER COMPLETE: CPT

## 2022-02-28 PROCEDURE — 99232 SBSQ HOSP IP/OBS MODERATE 35: CPT | Performed by: INTERNAL MEDICINE

## 2022-02-28 PROCEDURE — 97535 SELF CARE MNGMENT TRAINING: CPT

## 2022-02-28 PROCEDURE — 93010 ELECTROCARDIOGRAM REPORT: CPT | Performed by: INTERNAL MEDICINE

## 2022-02-28 PROCEDURE — 77001 FLUOROGUIDE FOR VEIN DEVICE: CPT

## 2022-02-28 PROCEDURE — C1750 CATH, HEMODIALYSIS,LONG-TERM: HCPCS

## 2022-02-28 PROCEDURE — 6360000002 HC RX W HCPCS: Performed by: RADIOLOGY

## 2022-02-28 PROCEDURE — 36581 REPLACE TUNNELED CV CATH: CPT

## 2022-02-28 PROCEDURE — 02HV33Z INSERTION OF INFUSION DEVICE INTO SUPERIOR VENA CAVA, PERCUTANEOUS APPROACH: ICD-10-PCS | Performed by: RADIOLOGY

## 2022-02-28 PROCEDURE — 6370000000 HC RX 637 (ALT 250 FOR IP): Performed by: NURSE PRACTITIONER

## 2022-02-28 PROCEDURE — 2500000003 HC RX 250 WO HCPCS: Performed by: RADIOLOGY

## 2022-02-28 RX ORDER — HEPARIN SODIUM 1000 [USP'U]/ML
INJECTION, SOLUTION INTRAVENOUS; SUBCUTANEOUS
Status: COMPLETED | OUTPATIENT
Start: 2022-02-28 | End: 2022-02-28

## 2022-02-28 RX ORDER — FENTANYL CITRATE 50 UG/ML
INJECTION, SOLUTION INTRAMUSCULAR; INTRAVENOUS
Status: COMPLETED | OUTPATIENT
Start: 2022-02-28 | End: 2022-02-28

## 2022-02-28 RX ORDER — METHOXY POLYETHYLENE GLYCOL-EPOETIN BETA 30 UG/.3ML
30 INJECTION, SOLUTION INTRAVENOUS
COMMUNITY

## 2022-02-28 RX ORDER — ACETAMINOPHEN 325 MG/1
650 TABLET ORAL EVERY 4 HOURS PRN
Status: DISCONTINUED | OUTPATIENT
Start: 2022-02-28 | End: 2022-02-28

## 2022-02-28 RX ORDER — LIDOCAINE HYDROCHLORIDE 10 MG/ML
INJECTION, SOLUTION INFILTRATION; PERINEURAL
Status: COMPLETED | OUTPATIENT
Start: 2022-02-28 | End: 2022-02-28

## 2022-02-28 RX ORDER — CHOLECALCIFEROL (VITAMIN D3) 1250 MCG
1.25 CAPSULE ORAL SEE ADMIN INSTRUCTIONS
COMMUNITY

## 2022-02-28 RX ORDER — GABAPENTIN 300 MG/1
300 CAPSULE ORAL DAILY PRN
COMMUNITY

## 2022-02-28 RX ORDER — METOPROLOL SUCCINATE 50 MG/1
50 TABLET, EXTENDED RELEASE ORAL DAILY
Status: DISCONTINUED | OUTPATIENT
Start: 2022-03-01 | End: 2022-03-04 | Stop reason: HOSPADM

## 2022-02-28 RX ADMIN — ASPIRIN 81 MG CHEWABLE TABLET 81 MG: 81 TABLET CHEWABLE at 09:53

## 2022-02-28 RX ADMIN — HEPARIN SODIUM 2600 UNITS: 1000 INJECTION, SOLUTION INTRAVENOUS; SUBCUTANEOUS at 17:00

## 2022-02-28 RX ADMIN — HEPARIN SODIUM 2600 UNITS: 1000 INJECTION, SOLUTION INTRAVENOUS; SUBCUTANEOUS at 16:59

## 2022-02-28 RX ADMIN — SODIUM CHLORIDE, PRESERVATIVE FREE 10 ML: 5 INJECTION INTRAVENOUS at 09:52

## 2022-02-28 RX ADMIN — HEPARIN SODIUM 5000 UNITS: 5000 INJECTION INTRAVENOUS; SUBCUTANEOUS at 20:58

## 2022-02-28 RX ADMIN — LIDOCAINE HYDROCHLORIDE 5 ML: 10 INJECTION, SOLUTION INFILTRATION; PERINEURAL at 16:47

## 2022-02-28 RX ADMIN — SODIUM CHLORIDE, PRESERVATIVE FREE 10 ML: 5 INJECTION INTRAVENOUS at 20:58

## 2022-02-28 RX ADMIN — CEFAZOLIN SODIUM 1000 MG: 1 INJECTION, POWDER, FOR SOLUTION INTRAMUSCULAR; INTRAVENOUS at 18:41

## 2022-02-28 RX ADMIN — Medication 25 MCG: at 16:47

## 2022-02-28 RX ADMIN — LANTHANUM CARBONATE 1000 MG: 500 TABLET, CHEWABLE ORAL at 18:40

## 2022-02-28 RX ADMIN — Medication 1 TABLET: at 09:53

## 2022-02-28 ASSESSMENT — ENCOUNTER SYMPTOMS
RESPIRATORY NEGATIVE: 1
GASTROINTESTINAL NEGATIVE: 1
ALLERGIC/IMMUNOLOGIC NEGATIVE: 1

## 2022-02-28 ASSESSMENT — PAIN SCALES - GENERAL: PAINLEVEL_OUTOF10: 0

## 2022-02-28 NOTE — PROGRESS NOTES
[facility]  Progress Note    Donal Cano  MRN: [id number]  Date: 2/28/2022  LOS: 2     Reason for F/U :   MSSA sepsis    Impression :   1. MSSA sepsis -concern is for a total catheter associated infection, endocarditis, AICD associated endocarditis. 2. End-stage renal disease on hemodialysis  3. CAD with ischemic cardiomyopathy status post AICD  4. Atrial fibrillation on anticoagulation  5. Diabetes mellitus type 2  6. Obesity    Recommendations:   · Continue intravenous antimicrobial therapy with cefazolin. · The patient had a 2D echocardiogram done earlier today. · The patient has an order to exchange the tunneled hemodialysis catheter in the right groin. · We will continue to monitor his progress and adjust therapy accordingly    Infection Control Recommendations:   Universal precautions    Discharge Planning:   Estimated Length of IV antimicrobials: To be determined  Patient will need Midline Catheter Insertion/ PICC line Insertion: No  Patient will need: Home IV , [place],  SNF,  LTAC: Undetermined  Patient willneed outpatient wound care: No    Medical Decision making / Summary of Stay:   Donal Cano is a 62y.o.-year-old male who was initially admitted on 2/26/2022. Warren Simmons has a history of end-stage renal disease and he has been on hemodialysis for about 5 years now. He does reports that about 2 weeks ago he did have his right-sided femoral tunneled hemodialysis catheter changed because he was having some bleeding issues for close to 2 weeks at that catheter site. The patient was in his usual state of health until Friday he reports that he has some diarrhea about 4 loose bowel movements, headache and by Saturday morning he was feeling kind of tired when he went to hemodialysis he was noted to have a high fever. He did not report any subjective fevers or chills. No cough or shortness of shortness of breath. No chest pains or palpitations. No abdominal pain.   He did not Tenderness: There is no abdominal tenderness. Musculoskeletal:         General: Normal range of motion. Cervical back: Neck supple. Lymphadenopathy:      Cervical: No cervical adenopathy. Skin:     General: Skin is warm and dry. Neurological:      Mental Status: He is alert and oriented to person, place, and time. Laboratory data:   I have independently reviewed the followinglabs:  CBC with Differential:   Recent Labs     02/26/22  1000   WBC 15.4*   HGB 10.0*   HCT 30.8*   *   LYMPHOPCT 7*   MONOPCT 4     BMP:   Recent Labs     02/26/22  1000 02/27/22  0614   * 132*   K 4.5 5.2   CL 90* 92*   CO2 27 26   BUN 25* 39*   CREATININE 7.91* 10.83*     Hepatic Function Panel:   Recent Labs     02/26/22  1000 02/27/22  0614   PROT 8.4* 7.0   LABALBU 4.1 3.4*   BILITOT 0.77 0.41   ALKPHOS 110 79   ALT 13 16   AST 16 28         Lab Results   Component Value Date    PROCAL >100.00 02/27/2022     Lab Results   Component Value Date    CRP 82.5 09/21/2019     Lab Results   Component Value Date    SEDRATE 46 (H) 08/21/2014         Lab Results   Component Value Date    DDIMER 4.96 05/20/2015    DDIMER 5.23 04/09/2015    DDIMER 1.14 08/22/2014    DDIMER 1.03 08/21/2014     Lab Results   Component Value Date    FERRITIN 265 01/30/2014     Lab Results   Component Value Date     02/26/2022     Lab Results   Component Value Date    FIBRINOGEN 477 02/26/2022    FIBRINOGEN 476 05/20/2015    FIBRINOGEN 697 04/09/2015       Results in Past 30 Days  Result Component Current Result Ref Range Previous Result Ref Range   SARS-CoV-2, Rapid Not Detected (2/26/2022) Not Detected Not in Time Range      Lab Results   Component Value Date    COVID19 Not Detected 02/26/2022    COVID19 Not Detected 10/17/2020       No results for input(s): [facility] in the last 72 hours.     Imaging Studies:   No new imaging    Cultures:     Culture, Blood 1 [[id number]] (Abnormal) Collected: 02/26/22 1016   Order Status: Completed Specimen: Blood Updated: 02/28/22 0826    Specimen Description . BLOOD 10 ML  RIGHT HAND    Culture POSITIVE Blood Culture     DIRECT GRAM STAIN FROM BOTTLE: GRAM POSITIVE COCCI IN CLUSTERS     STAPHYLOCOCCUS AUREUS For susceptibility, refer to previous culture. Abnormal      (NOTE) Direct Gram Stain from bottle result called to and read back by: HODAN SANABRIA on 2/27/2022 at 00:45 AND TO HORTENCIA PORTER 2-27-22 AT 0640   Culture, Blood 1 [[id number]] (Abnormal)  Collected: 02/26/22 1000   Order Status: Completed Specimen: Blood Updated: 02/28/22 0825    Specimen Description . BLOOD    Special Requests RAC 12ML    Culture POSITIVE Blood Culture     DIRECT GRAM STAIN FROM BOTTLE: GRAM POSITIVE COCCI IN CLUSTERS     Staphylococcus aureus Detected: mecA/C and MREJ Not Detected Methodology- Polymerase Chain Reaction (PCR)     STAPHYLOCOCCUS AUREUS Abnormal      (NOTE) Direct Gram Stain from bottle and Polymerase Chain Reaction (PCR) results called to and read back by: HODAN SANABRIA on 2/27/2022 at 00:45 AND TO HORTENCIA PORTER 2-27-22 AT 6866   Susceptibility     Staphylococcus aureus     BACTERIAL SUSCEPTIBILITY PANEL GLENN     clindamycin <=0.25  Sensitive     erythromycin <=0.25  Sensitive     gentamicin <=0.5  Sensitive 1     levofloxacin <=0.12  Sensitive     oxacillin <=0.25  Sensitive     penicillin 0.25  Resistant     tetracycline <=1  Sensitive     trimethoprim-sulfamethoxazole <=10  Sensitive        Medications:      heparin (porcine)  5,000 Units SubCUTAneous 3 times per day    ceFAZolin  1,000 mg IntraVENous Daily    aspirin  81 mg Oral Daily    chrissie-pito  1 tablet Oral Daily    lanthanum  1,000 mg Oral TID WC    midodrine  10 mg Oral TID WC    metoprolol succinate  25 mg Oral Daily    sodium chloride flush  5-40 mL IntraVENous 2 times per day           [facility]  Beatriz Nolan MD  Perfect Serve messaging  OFFICE: [phone]      Electronically signed by Beatriz Nolan MD on 2/28/2022 at 10:17 AM  Thank you for allowing us to participate in the care of this patient. Please call with questions. This note iscreated with the assistance of a speech recognition program.  While intending to generate a document that actually reflects the content of the visit, the document can still have some errors including those of syntax andsound a like substitutions which may escape proof reading. In such instances, actual meaning can be extrapolated by contextual diversion.

## 2022-02-28 NOTE — PROGRESS NOTES
Physical Therapy    Facility/Department: [facility]  Initial Assessment    NAME: Jn Madrigal  : 1960  MRN: [id number]    Date of Service: 2022    Discharge Recommendations:  Patient would benefit from continued therapy after discharge        Assessment   Body structures, Functions, Activity limitations: Decreased endurance;Decreased balance;Decreased posture  Assessment: Pt limited by lack of activity tolerance  Prognosis: Good  Decision Making: High Complexity  PT Education: PT Role;Plan of Care;Energy Conservation;General Safety  Patient Education: Energy conservation handout  REQUIRES PT FOLLOW UP: Yes  Activity Tolerance  Activity Tolerance: Patient limited by endurance; Patient Tolerated treatment well       Patient Diagnosis(es): The primary encounter diagnosis was Shortness of breath. Diagnoses of Prolonged Q-T interval on ECG and Septicemia [facility]) were also pertinent to this visit. has a past medical history of A-V fistula ([facility] [address] AICD (automatic cardioverter/defibrillator) present, Asthma, Atrial fibrillation ([facility] [address] Blood transfusion reaction, CAD (coronary artery disease), CHF (congestive heart failure) ([address] CHF (congestive heart failure) ([address] COPD (chronic obstructive pulmonary disease) ([facility] [address] CRF (chronic renal failure), Diabetes mellitus ([facility] [address] Dialysis patient ([address] GERD (gastroesophageal reflux disease), Hemodialysis patient ([facility] [address] Hypertension, and Obesity. has a past surgical history that includes vascular surgery; Cardiac defibrillator placement (); and pacemaker placement.     Restrictions  Restrictions/Precautions  Restrictions/Precautions: General Precautions,Fall Risk  Required Braces or Orthoses?: No  Vision/Hearing        Subjective  General  Patient assessed for rehabilitation services?: Yes  Response To Previous Treatment: Not applicable  Family / Caregiver Present: No  Follows Commands: Within Functional Limits  General Comment  Comments: OK for PT per Sandy PETTIT  Pain Screening  Patient Currently in Pain: Denies  Vital Signs  Patient Currently in Pain: Denies       Orientation  Orientation  Overall Orientation Status: Within Normal Limits  Social/Functional History  Social/Functional History  Lives With: Alone  Type of Home: House  Home Layout: One level,Laundry in basement  Home Access: Stairs to enter without rails  Entrance Stairs - Number of Steps: 6  Bathroom Shower/Tub: Tub/Shower unit  Bathroom Toilet: Standard  Bathroom Equipment: Grab bars in shower,Grab bars around toilet  Bathroom Accessibility: Accessible  Home Equipment: [employer]  ADL Assistance: [address]: [address] [place] [address] Responsibilities: Yes  Ambulation Assistance: Independent (cane PRN)  Transfer Assistance: Independent  Active : No  Cognition        Objective     Observation/Palpation  Posture: Good  Edema: B feet    AROM RLE (degrees)  RLE AROM: WNL  AROM LLE (degrees)  LLE AROM : WNL  AROM RUE (degrees)  RUE General AROM: See OT eval for B UE ROM  Strength RLE  Strength RLE: WFL  Comment: B LE's 4/5 to 4+/5  Strength LLE  Strength LLE: WFL  Strength RUE  Comment: See OT eval for B UE MMT  Tone RLE  RLE Tone: Normotonic  Tone LLE  LLE Tone: Normotonic  Motor Control  Gross Motor?: WNL  Sensation  Overall Sensation Status: WNL  Bed mobility  Rolling to Left: Independent  Supine to Sit: Independent  Scooting: Independent  Transfers  Sit to Stand: Contact guard assistance  Stand to sit: Contact guard assistance  Stand Pivot Transfers: Contact guard assistance  Ambulation  Ambulation?: Yes  Ambulation 1  Surface: level tile  Device: No Device  Assistance: Contact guard assistance  Gait Deviations: Slow Karyn  Distance: 45' x 1  Comments:  To chair  Stairs/Curb  Stairs?: No     Balance  Posture: Good  Sitting - Static: Good  Sitting - Dynamic: Good  Standing - Static: Good;-  Standing - Dynamic: Fair;+        Plan   Plan  Times per week: 1-2x/day,5-6 days/week  Current Treatment Recommendations: Transfer Training,Balance Training,Stair training,Gait Training,Endurance Training  Safety Devices  Type of devices: Gait belt,Call light within reach,Left in chair (No bed alarm on when PT arrived)    G-Code       OutComes Score     Gait Score: 11 (02/28/22 1410)                                            AM-PAC Score  AM-PAC Inpatient Mobility Raw Score : 20 (02/28/22 1410)  AM-PAC Inpatient T-Scale Score : 47.67 (02/28/22 1410)  Mobility Inpatient CMS 0-100% Score: 35.83 (02/28/22 1410)  Mobility Inpatient CMS G-Code Modifier : CJ (02/28/22 1410)          Goals  Short term goals  Time Frame for Short term goals: 12 treatments  Short term goal 1: Independent ambulation 200' x 1  Short term goal 2:  Independently ascend/descend 7 steps without HR  Short term goal 3: Good standing balance  Short term goal 4: Tolerate 30 min ther act  Patient Goals   Patient goals : Have procedure and go home   Spoke w/ Daryle Lab RN regarding pts stated wgt in chart for 2-28-22 as 134#, when previous wgts were 280# +    Therapy Time   Individual Concurrent Group Co-treatment   Time In 1059 (Treatment time 24 minutes)         Time Out [place], [place]

## 2022-02-28 NOTE — PROGRESS NOTES
Reason for Follow up: ESRD. HISTORY:    Feels better. Review Of Systems:   Constitutional: No fever, chills, lethargy, weakness or wt loss. Cardiac:  No chest pain, dyspnea, orthopnea or PND. Pulmonary:  No cough, phlegm or wheezing. Abdomen:  No abdominal pain, nausea, vomiting or diarrhea. :   No hematuria, pyuria, dysuria or flank pain. Extremities:  No swelling or joint pains. Scheduled Meds:   heparin (porcine)  5,000 Units SubCUTAneous 3 times per day    ceFAZolin  1,000 mg IntraVENous Daily    aspirin  81 mg Oral Daily    chrissie-pito  1 tablet Oral Daily    lanthanum  1,000 mg Oral TID WC    midodrine  10 mg Oral TID WC    metoprolol succinate  25 mg Oral Daily    sodium chloride flush  5-40 mL IntraVENous 2 times per day   Continuous Infusions:   sodium chloride       PRN Meds:perflutren lipid microspheres, ondansetron, sodium chloride flush, sodium chloride, acetaminophen **OR** acetaminophen    Allergies   Allergen Reactions    Spironolactone Itching and Other (See Comments)       Physical Exam:  Blood pressure 128/75, pulse 78, temperature 98.6 °F (37 °C), temperature source Oral, resp. rate 18, height 6' 1\" (1.854 m), weight 134 lb 1 oz (60.8 kg), SpO2 93 %. In: 200 [P.O.:200]  Out: -   In: 200   Out: -     General:  Awake, alert, not in distress. Appears to be stated age. HEENT: Atraumatic, normocephalic. Anicteric sclera. Pink and moist oral mucosa. No carotid bruit. No JVD. Chest: Bilateral air entry, clear to auscultation, no wheezing, rhonchi or rales. Cardiovascular: RRR, S1S2, no murmur, rub or gallop. Has lower extremity edema. Abdomen: Soft, non tender to palpation. Active bowel sounds x 4 quadrants. Musculoskeletal: Active ROM x 4 extremities. No cyanosis or clubbing. Integumentary: Pink, warm and dry. Free from rash or lesions. Skin turgor normal.  CNS: Oriented to person, place and time. Speech clear. Face symmetrical. No tremor.      Data:  CBC:   Lab Results Component Value Date    WBC 15.4 (H) 02/26/2022    HGB 10.0 (L) 02/26/2022    HCT 30.8 (L) 02/26/2022    MCV 95.1 02/26/2022     (L) 02/26/2022     BMP:    Lab Results   Component Value Date     (L) 02/27/2022    K 5.2 02/27/2022    CL 92 (L) 02/27/2022    CO2 26 02/27/2022    BUN 39 (H) 02/27/2022    CREATININE 10.83 (HH) 02/27/2022    GLUCOSE 85 02/27/2022     CMP:   Lab Results   Component Value Date     (L) 02/27/2022    K 5.2 02/27/2022    CL 92 (L) 02/27/2022    CO2 26 02/27/2022    BUN 39 (H) 02/27/2022    CREATININE 10.83 (HH) 02/27/2022    GLUCOSE 85 02/27/2022    CALCIUM 9.6 02/27/2022    PROT 7.0 02/27/2022    LABALBU 3.4 (L) 02/27/2022    BILITOT 0.41 02/27/2022    ALKPHOS 79 02/27/2022    AST 28 02/27/2022    ALT 16 02/27/2022      Hepatic:   Lab Results   Component Value Date    AST 28 02/27/2022    ALT 16 02/27/2022    BILITOT 0.41 02/27/2022    ALKPHOS 79 02/27/2022     BNP:   Lab Results   Component Value Date    BNP NOT REPORTED 08/21/2014     Lipids:   Lab Results   Component Value Date    CHOL 151 02/02/2014    HDL 29 (L) 02/02/2014     INR:   Lab Results   Component Value Date    INR 1.2 02/27/2022     PTH: No results found for: PTH  Phosphorus:    Lab Results   Component Value Date    PHOS 2.5 02/23/2017     Ionized Calcium: No results found for: IONCA  Magnesium:   Lab Results   Component Value Date    MG 2.0 09/25/2018     Albumin:   Lab Results   Component Value Date    LABALBU 3.4 (L) 02/27/2022     Last 3 CK, CKMB, Troponin: @LABRCNT(CKTOTAL:3,CKMB:3,TROPONINI:3)       URINE:)No results found for: Chelsea Shah    Radiology:   Reviewed. Assessment:  1. ESRD on Hemodialysis Tuesday Thursday and Saturday at [facility] hemodialysis facility using right groin tunneled catheter under Dr Anne Marie Gonzales. His dry weight is not known. Admitted with 129 kg  2. Anemia of chronic disease stable  3. Secondary hyperparathyroidism stable  4. Hypertension stable pressures  5.  Staph aureus

## 2022-02-28 NOTE — PLAN OF CARE
Problem: Falls - Risk of:  Goal: Will remain free from falls  Description: Will remain free from falls  Outcome: Ongoing  Goal: Absence of physical injury  Description: Absence of physical injury  Outcome: Ongoing     Problem: Cardiac:  Goal: Ability to maintain vital signs within normal range will improve  Description: Ability to maintain vital signs within normal range will improve  Outcome: Ongoing  Goal: Cardiovascular alteration will improve  Description: Cardiovascular alteration will improve  Outcome: Ongoing     Problem: Health Behavior:  Goal: Will modify at least one risk factor affecting health status  Description: Will modify at least one risk factor affecting health status  Outcome: Ongoing  Goal: Identification of resources available to assist in meeting health care needs will improve  Description: Identification of resources available to assist in meeting health care needs will improve  Outcome: Ongoing     Problem: Physical Regulation:  Goal: Complications related to the disease process, condition or treatment will be avoided or minimized  Description: Complications related to the disease process, condition or treatment will be avoided or minimized  Outcome: Ongoing     Problem: Discharge Planning:  Goal: Discharged to appropriate level of care  Description: Discharged to appropriate level of care  Outcome: Ongoing     Problem: Gas Exchange - Impaired:  Goal: Levels of oxygenation will improve  Description: Levels of oxygenation will improve  Outcome: Ongoing     Problem: Infection, Septic Shock:  Goal: Will show no infection signs and symptoms  Description: Will show no infection signs and symptoms  Outcome: Ongoing     Problem: Infection - Ventilator-Associated Pneumonia:  Goal: Absence of pulmonary infection  Description: Absence of pulmonary infection  Outcome: Ongoing     Problem: Serum Glucose Level - Abnormal:  Goal: Ability to maintain appropriate glucose levels will improve  Description: Ability to maintain appropriate glucose levels will improve  Outcome: Ongoing     Problem: Tissue Perfusion, Altered:  Goal: Circulatory function within specified parameters  Description: Circulatory function within specified parameters  Outcome: Ongoing     Problem: Venous Thromboembolism:  Goal: Will show no signs or symptoms of venous thromboembolism  Description: Will show no signs or symptoms of venous thromboembolism  Outcome: Ongoing  Goal: Absence of signs or symptoms of impaired coagulation  Description: Absence of signs or symptoms of impaired coagulation  Outcome: Ongoing

## 2022-02-28 NOTE — BRIEF OP NOTE
Brief Postoperative Note    Judith Beltran  YOB: 1960  [id number]    Pre-operative Diagnosis: Chronic Renal Failure + Sepsis due to methicillin susceptible Staphylococcus aureus (MSSA) without acute organ dysfunction ([address]  Limited HDA access. Post-operative Diagnosis: Same    Procedure: Tunneled Dialysis Catheter    Medication Given: fentanyl    Anesthesia: 2%Lidocaine Local Injection     Surgeons/Assistants: Cherry Callahan MD    Estimated Blood Loss: Minimal    Complications: none    Existing rt femoral dialysis catheter removed over a stiff Glidewire, and a new 14 Fr x 44 cm (tip to cuff) Palindrome tunneled HD Catheter placed Site:  Right Femoral Vein. Dressing applied. Vital signs were reviewed and were stable after the procedure. Tip of old catheter was provided for further testing. New dialysis catheter is ready for use at this time. Discharged back to floor. VSS.   Electronically signed by Cherry Callahan MD on 2/28/2022 at 5:06 PM

## 2022-02-28 NOTE — CARE COORDINATION
Discharge planning    Patient chart reviewed. Appreciate prior CM assessment. Per CM patient lives alone. Noted that if needs home care [facility]. May need walker. Therapy is ordered to evaluate    Cardiology notes that abnormal trop with no s/s of acs. No further cardiac worl up needed. Nephrology following for his normal HD schedule of TThS at [facility] on [address]. Under dr Octavia Estrada group. He notes that patientwill need to have exchange of catheter over guidewire tomorrow. ID [id number]  Impression:   1. MSSA sepsis 2 of 2 sets and the patient to has recently had tunneled hemodialysis catheter issues with recent exchange-concern is for a total catheter associated infection, endocarditis, AICD associated endocarditis.     Recommendations   · I will switch the antimicrobial therapy to cefazolin. · Ideally the patient would need a tunneled hemodialysis catheter removed but it would seem that the patient has access issues and the issue of tunneled hemodialysis catheter removal will need to be discussed with the vascular surgery team.  · 2D echocardiogram to work-up for endocarditis. · While the patient has diarrhea I doubt that this is related to the current infection nor do I think that the patient has any type of colitis.     Will need to anticipate setting up atb with HD.

## 2022-02-28 NOTE — PROGRESS NOTES
Transitions of Care Pharmacy Service   Medication Review    The patient's list of current home medications has been reviewed. Source(s) of information: spoke to patient, [facility] scripts, [facility] and Dialysis medication list     Based on information provided by the above source(s), I have updated the patient's home med list as described below. I changed or updated the following medications on the patient's home medication list:  Discontinued b-complex, c- folic acid tab- 1 qd hs   eliquis 5 mg- 1 qd      Added gabapentin 300 mg daily prn   mircera 30 mcg- every 2 weeks with dialysis   vitamin D 1.25 mcg- 3 times a week with dialysis      Adjusted   metoprolol Xl 50 mg- 1 qd  methocarbamol 750 mg- tid prn   cinacalet 60 mg- Tues, Thurs and Sat with dialysis      Other Notes Pt stated that he stop taking eliquis 5 mg in Jan 2022 per Dr. Macy Callahan due to bleeding   Patient recently filled script for Atorvastatin 20 mg but stated that he is currently not taking because he was taken off by Dr. Jamilah Sinclair. Please feel free to call me with any questions about this encounter. Thank you. This note will be reviewed and co-signed by the Transitions of Care Pharmacist. The pharmacist will review inpatient orders and contact the physician about any discrepancies. Js Yeung, pharmacy technician  Transitions of [facility] Pharmacy Service  Phone:  [phone]  Fax: [phone]      Electronically signed by Js Yeung on 2/28/2022 at 1:02 PM         Medications Prior to Admission:   gabapentin (NEURONTIN) 300 MG capsule, Take 300 mg by mouth daily as needed.    Methoxy PEG-Epoetin Beta (MIRCERA) 30 MCG/0.3ML SOSY, Inject 30 mcg as directed every 14 days Patient takes this with dialysis every 2 weeks  Cholecalciferol (VITAMIN D3) 1.25 MG (01719 [facility]) CAPS, Take 1.25 capsules by mouth See Admin Instructions Patient takes this medication 3 times a week with dialysis  methocarbamol (ROBAXIN) 750 MG tablet, Take 750 mg by mouth 3 times daily as needed   lanthanum (FOSRENOL) 1000 MG chewable tablet, Take 1,000 mg by mouth 3 times daily (with meals)   metoprolol succinate (TOPROL XL) 50 MG extended release tablet, Take 50 mg by mouth daily   aspirin 81 MG chewable tablet, Take 81 mg by mouth daily  midodrine (PROAMATINE) 10 MG tablet, Take 10 mg by mouth 2 times daily as needed (at dialysis as needed)   cinacalcet (SENSIPAR) 60 MG tablet, 60 mg every Tuesday Thursday and Saturday with dialysis (Patient taking differently: Take 60 mg by mouth three times a week every Tuesday Thursday and Saturday with dialysis)

## 2022-02-28 NOTE — PROGRESS NOTES
Occupational Therapy  Facility/Department: [facility] PROGRESSIVE CARE  Daily Treatment Note  NAME: Kennith Cockayne  : 1960  MRN: [id number]    Date of Service: 2022   RN Pama Gosselin reports patient is medically stable for therapy treatment this date. Chart reviewed prior to treatment and patient is agreeable for therapy. All lines intact and patient positioned comfortably at end of treatment. All patient needs addressed prior to ending therapy session. Discharge Recommendations:  Home with assist PRN       Assessment   Performance deficits / Impairments: Decreased functional mobility ; Decreased ADL status; Decreased safe awareness;Decreased endurance;Decreased balance;Decreased high-level IADLs  Assessment: Pt would benefit from continuation of skilled OT until d/c for increasing overall endurance and safety for safe return home. Prognosis: Good  OT Education: OT Role;Plan of Care;ADL Adaptive Strategies;Transfer Training;Energy Conservation  Patient Education: pursed lip breathing, postural control, benefits of AD use during mobility, benefits of performing tasks seated for EC/WS and fall prevention, home safety, purchasing a pulse ox to monitor SpO2 level. Pt given and edu on COPD handout, EC/WS handout, and fall prevention handout. Pt verbalized understanding. REQUIRES OT FOLLOW UP: Yes  Activity Tolerance  Activity Tolerance: Patient Tolerated treatment well  Safety Devices  Safety Devices in place: Yes  Type of devices: Left in chair;Call light within reach;Nurse notified  Equipment Recommendations  Equipment Needed: Yes  Walker: Rolling  Shower Chair: X         Patient Diagnosis(es): The primary encounter diagnosis was Shortness of breath. Diagnoses of Prolonged Q-T interval on ECG and Septicemia [facility]) were also pertinent to this visit.       has a past medical history of A-V fistula ([facility] [address] AICD (automatic cardioverter/defibrillator) present, Asthma, Atrial fibrillation ([facility] [address] [facility] Blood transfusion reaction, CAD (coronary artery disease), CHF (congestive heart failure) ([facility]), CHF (congestive heart failure) ([facility] [address] [facility] COPD [facility]chronic obstructive [facility] disease) [facility] CRF (chronic renal failure), Diabetes mellitus ([facility] GERD (gastroesophageal reflux disease), Hemod[facility] ([facility] Hypertension, and Obesity. has a past surgical history that includes vascular surgery; Cardiac defibrillator placement (2006); and pacemaker placement. Restrictions  Restrictions/Precautions  Restrictions/Precautions: General Precautions,Fall Risk  Required Braces or Orthoses?: No  Subjective   General  Patient assessed for rehabilitation services?: Yes  Family / Caregiver Present: No  Referring Practitioner: Barry Granados  Diagnosis: SOB  Subjective  Subjective: \"Its Horald Specking have to be quick they are coming to get me\"  General Comment  Comments: Pt sitting up in recliner agreeable to OT treatment      Orientation  Orientation  Overall Orientation Status: Within Functional Limits  Objective    ADL  Grooming: Stand by assistance;Supervision (washing face seated in recliner and brushing teeth standing in bathroom)  UE Bathing: Supervision  LE Bathing: Supervision  UE Dressing: Supervision (don/doff of hospital gown)  LE Dressing: Supervision;Stand by assistance (don/doff of socks)  Additional Comments: Pt participated in sponge bath seated in recliner. Pt did not have any difficulties. Pt demo'd good care of skin integrity by donning lotion on all parts of body. Balance  Sitting Balance: Independent  Standing Balance: Stand by assistance  Standing Balance  Time: 1-2 min  Functional Mobility  Functional - Mobility Device: No device  Activity: To/from bathroom  Assist Level: Stand by assistance  Functional Mobility Comments: Pt furniture walking during functional mobility and writer suggested multiple kinds of AD with edu but pt declined.  Mod vc's for pacing, slow/controlled movement, visual scanning, upright posture, pursed lip breathing, and overall safety. Bed mobility  Comment: N/T pt seated in recliner at start of session and retired to at end of session. Transfers  Sit to stand: Stand by assistance  Stand to sit: Stand by assistance  Transfer Comments: Min vc's for slow/controlled sit/stands, squaring self and reaching back prior to sitting, pursed lip breathing, upright posture, and pacing for increased safety/decreased risk of falls. Plan   Plan  Times per week: 3-5  Current Treatment Recommendations: Balance Training,Functional Mobility Training,Endurance Training,Safety Education & Training,Patient/Caregiver Education & Training,Equipment Evaluation, Education, & procurement,Self-Care / ADL                                                  AM-PAC Score        AM-[facility] Inpatient Daily Activity Raw Score: 19 (02/28/22 1539)  AM-PAC Inpatient ADL T-Scale Score : 40.22 (02/28/22 1539)  ADL Inpatient CMS 0-100% Score: 42.8 (02/28/22 1539)  ADL Inpatient CMS G-Code Modifier : CK (02/28/22 1539)    Goals  Short term goals  Time Frame for Short term goals: Within 10 sessions, pt will. ..   Short term goal 1: demo mod I and safety with transfers and functional mobility using DME PRN  Short term goal 2: demo mod I and safety with bathing, dressing, toileting, and grooming at   Short term goal 3: improve activity tolerance to engage in 20-30 minutes of dynamic seated/standing activity  Short term goal 4: demo G understanding and implementation of EC/WC, safety awareness, AE, and DME PRN during engagement in ADLs/IADLs  Short term goal 5: demo mod I and safety with IADLs with rest breaks PRN       Therapy Time   Individual Concurrent Group Co-treatment   Time In 1532         Time Out 1618         Minutes [address], [place]

## 2022-02-28 NOTE — PLAN OF CARE
Problem: Falls - Risk of:  Goal: Will remain free from falls  Description: Will remain free from falls  2/27/2022 2304 by Loenila Posey RN  Outcome: Ongoing     Problem: Cardiac:  Goal: Ability to maintain vital signs within normal range will improve  Description: Ability to maintain vital signs within normal range will improve  Outcome: Ongoing     Problem: Health Behavior:  Goal: Will modify at least one risk factor affecting health status  Description: Will modify at least one risk factor affecting health status  Outcome: Ongoing     Problem: Physical Regulation:  Goal: Complications related to the disease process, condition or treatment will be avoided or minimized  Description: Complications related to the disease process, condition or treatment will be avoided or minimized  Outcome: Ongoing

## 2022-02-28 NOTE — PROGRESS NOTES
Section of Cardiology  Progress Note      Date:  2/28/2022  Patient: Keiry Griffith  Admission:  2/26/2022  9:13 AM  Admit DX: Shortness of breath [R06.02]  Septicemia ([address] [A41.9]  Prolonged Q-T interval on ECG [R94.31]  Sepsis ([address] [A41.9]  Age:  62 y.o., 1960     LOS: 2 days     Reason for evaluation:   Troponin Elevation      SUBJECTIVE:     The patient was seen and examined. Notes and labs reviewed. No issues overnight  Tele stable  Trops unremarkable  No complaints of CP, SOB, edema    OBJECTIVE:      EXAM:   Vitals:    VITALS:  /77   Pulse 69   Temp 98.1 °F (36.7 °C)   Resp 18   Ht 6' 1\" (1.854 m)   Wt 134 lb 1 oz (60.8 kg)   SpO2 92%   BMI 17.69 kg/m²   24HR INTAKE/OUTPUT:    Intake/Output Summary (Last 24 hours) at 2/28/2022 1509  Last data filed at 2/27/2022 1838  Gross per 24 hour   Intake 200 ml   Output --   Net 200 ml       CONSTITUTIONAL:  awake, alert, cooperative, no apparent distress, and appears stated age. HEENT: Normal jugular venous pulsations, no carotid bruits. Head is atraumatic, normocephalic. Eyes and oral mucosa are normal.  LUNGS: Good respiratory effort On auscultation: clear to auscultation bilaterally  CARDIOVASCULAR:  Normal apical impulse, regular rate and rhythm, normal S1 and S2, no S3 or S4, and no murmur or rub noted. ABDOMEN: Soft, nontender, nondistended. SKIN: Warm and dry. EXTREMITIES:No lower extremity edema. Motor movement grossly intact. No cyanosis or clubbing.     Current Inpatient Medications:   heparin (porcine)  5,000 Units SubCUTAneous 3 times per day    ceFAZolin  1,000 mg IntraVENous Daily    aspirin  81 mg Oral Daily    chrissie-pito  1 tablet Oral Daily    lanthanum  1,000 mg Oral TID WC    midodrine  10 mg Oral TID WC    metoprolol succinate  25 mg Oral Daily    sodium chloride flush  5-40 mL IntraVENous 2 times per day       IV Infusions (if any):   sodium chloride         Diagnostics:   Telemetry: NSR    ECHO: 2/28/2022  Mild to moderate left ventricular hypertrophy  Global left ventricular systolic function is mildly reduced  Estimated ejection fraction is 40-45 % . Evidence of diastolic dysfunction. Mild tricuspid regurgitation. Mild pulmonary hypertension, RVSP 43.    Labs:   CBC:  Recent Labs     02/26/22  1000   WBC 15.4*   HGB 10.0*   HCT 30.8*   *     Magnesium:No results for input(s): MG in the last 72 hours. BMP:  Recent Labs     02/26/22  1000 02/27/22  0614   * 132*   K 4.5 5.2   CALCIUM 9.5 9.6   CO2 27 26   BUN 25* 39*   CREATININE 7.91* 10.83*   LABGLOM 7* 5*   GLUCOSE 98 85     BNP:  Recent Labs     02/26/22  1000   PROBNP 2,319*     PT/INR:  Recent Labs     02/26/22  1000 02/27/22  0614   PROTIME 15.0* 15.3*   INR 1.2 1.2     APTT:  Recent Labs     02/26/22  1000   APTT >150.0*     CARDIAC ENZYMES:  Recent Labs     02/26/22  1000 02/26/22  1225 02/26/22  1538   TROPHS 146* 153* 163*     FASTING LIPID PANEL:  Lab Results   Component Value Date    HDL 29 02/02/2014    TRIG 230 04/11/2015     LIVER PROFILE:  Recent Labs     02/26/22  1000 02/27/22  0614   AST 16 28   ALT 13 16   LABALBU 4.1 3.4*   ALKPHOS 110 79   BILITOT 0.77 0.41   PROT 8.4* 7.0        ASSESSMENT:  · Troponinemia - 153-163 (similar to chronic prior levels)  · Non-ischemic Cardiomyopathy (EF 40-45%)  Previously as low as 25-40%  · Coronary Artery Disease  · Acute on Chronic Systolic/Diastolic Heart Failure  BNP 2319  · Sepsis  · T2DM  · S/p AICD implant  · ESRD on HD  · Abnormal EKG  · COPD    PLAN:  1. Continue current medications. Pt on midodrine 10mg TID for BP support as OP  Not on ACE/ARB due to hypotension  2. Cont. Toprol-XL 25mg  3. Cont treatment of sepsis  4. Cont. dialysis on schedule  5. ECHO showed stable LVEF. No indication for invasive workup. Troponin elevation is chronically elevated at this level and secondary to underlying sepsis and ESRD. 6. No further cardiac testing or change in medications.  Please

## 2022-02-28 NOTE — PROGRESS NOTES
[facility]  Office: [address], DO, Daisha Murray, DO, Maurisio Valentino, DO, Kari Guerra Blood, DO, Haydee Chanel MD, Toney Nova MD, Katie Rollins MD, Morenita Garzon MD, Mali Rios MD, Shanda Miller MD, Trevor Miranda MD, Haskell Leventhal, DO, Landen Hill, DO, Cheryle Bumpers, MD,  Nohemi Olivares, DO, Preethi Del Rio MD, Neel Hathaway MD, Bryant Ohara MD, Shan Hassan MD, Kalvin Spurling, MD, Irma Ross, CNP, Dakotah Harris, CNP, Joe Abdi, CNP, Denise Hoover, CNS, Dexter Malagon, CNP, Seda Adair, CNP, Debra Crabtree, CNP, Giovanna Marte, CNP, Bethany Sheehan, CNP, Kevin Godinez PA-C, Emma Will, DNP, Jordan Leora, [facility], Solis Petit, CNP, Luis Antonio Hayes, CNP, Ana Estes, CNP, Anabelle Britt, CNP, Charu Jarquin, [facility], Agustín Venegas [facility]    Progress Note    2/28/2022    1:03 PM    Name:   Keiry Griffith  MRN:     [id number]     Acct:      [de-identified]   Room:   [address] [place] [address] Day:  2  Admit Date:  2/26/2022  9:13 AM    PCP:   No primary care provider on file. Code Status:  Full Code    Subjective:     C/C:   Chief Complaint   Patient presents with    Fever     Interval History Status: not changed. Feels ok  Denies cp/sob/n/v    To have HD cath changed out today; has limited access    Brief History:     Keiry Griffith is a 62 y.o. Non- / non  male who presents with Fever   and is admitted to the hospital for the management of Sepsis ([facility] [address]    This 62 yom presents from dialysis center with fever to 103. He was unaware he had a fever and his only symptoms have been fatigue (chronic), sob/looney, and 4 episodes of diarrhea yesterday. He also noticed a little reddish drainage from HD cath in groin.   Denies c/s/s/cough/n/v/abd pain/cp/palpitations    Review of Systems:     Constitutional:  negative for chills, sweats  Respiratory:  negative for cough, dyspnea on exertion, shortness of breath, wheezing  Cardiovascular:  negative for chest pain, chest pressure/discomfort, lower extremity edema, palpitations  Gastrointestinal:  negative for abdominal pain, constipation, nausea, vomiting  Neurological:  negative for dizziness, headache    Medications: Allergies: Allergies   Allergen Reactions    Spironolactone Itching and Other (See Comments)       Current Meds:   Scheduled Meds:    heparin (porcine)  5,000 Units SubCUTAneous 3 times per day    ceFAZolin  1,000 mg IntraVENous Daily    aspirin  81 mg Oral Daily    chrissie-pito  1 tablet Oral Daily    lanthanum  1,000 mg Oral TID WC    midodrine  10 mg Oral TID WC    metoprolol succinate  25 mg Oral Daily    sodium chloride flush  5-40 mL IntraVENous 2 times per day     Continuous Infusions:    sodium chloride       PRN Meds: perflutren lipid microspheres, ondansetron, sodium chloride flush, sodium chloride, acetaminophen **OR** acetaminophen    Data:     Past Medical History:   has a past medical history of A-V fistula ([email or web address] AICD (automatic cardioverter/defibrillator) present, Asthma, Atrial fibrillation ([email or web address] Blood transfusion reaction, CAD (coronary artery disease), [facility]F (congest[facility] failure) [facility] COPD (chronic obstructive pulmonary disease) ([facility] CRF (chronic renal failure), Diabetes mellitus ([facility] GERD (gastroesophageal reflux disease), Hemodialysis patient ([email or web address] Hypertension, and Obesity. Social History:   reports that he has never smoked. He has never used smokeless tobacco. He reports that he does not drink alcohol and does not use drugs.      Family History:   Family History   Adopted: Yes       Vitals:  /77   Pulse 69   Temp 98.1 °F (36.7 °C)   Resp 18   Ht 6' 1\" (1.854 m)   Wt 134 lb 1 oz (60.8 kg)   SpO2 92%   BMI 17.69 kg/m²   Temp (24hrs), Av.2 °F (36.8 °C), Min:97.7 °F (36.5 °C), Max:98.6 °F (37 °C)    Recent Labs     22   POCGLU 75       I/O (24Hr): Intake/Output Summary (Last 24 hours) at 2/28/2022 1303  Last data filed at 2/27/2022 1838  Gross per 24 hour   Intake 200 ml   Output --   Net 200 ml       Labs:  Hematology:  Recent Labs     02/26/22  1000 02/27/22  0614   WBC 15.4*  --    RBC 3.24*  --    HGB 10.0*  --    HCT 30.8*  --    MCV 95.1  --    MCH 30.9  --    MCHC 32.5  --    RDW 13.9  --    *  --    MPV 11.7  --    INR 1.2 1.2     Chemistry:  Recent Labs     02/26/22  1000 02/26/22  1225 02/26/22  1538 02/27/22  0614   *  --   --  132*   K 4.5  --   --  5.2   CL 90*  --   --  92*   CO2 27  --   --  26   GLUCOSE 98  --   --  85   BUN 25*  --   --  39*   CREATININE 7.91*  --   --  10.83*   ANIONGAP 16  --   --  14   LABGLOM 7*  --   --  5*   GFRAA 8*  --   --  6*   CALCIUM 9.5  --   --  9.6   PROBNP 2,319*  --   --   --    TROPHS 146* 153* 163*  --      Recent Labs     02/26/22  1000 02/26/22  1225 02/27/22  0614 02/27/22  1816   PROT 8.4*  --  7.0  --    LABALBU 4.1  --  3.4*  --    AST 16  --  28  --    ALT 13  --  16  --    LDH  --  189  --   --    ALKPHOS 110  --  79  --    BILITOT 0.77  --  0.41  --    POCGLU  --   --   --  75     ABG:  Lab Results   Component Value Date    POCPH 7.43 05/22/2015    POCPCO2 42 05/22/2015    POCPO2 110 05/22/2015    POCHCO3 27.8 05/22/2015    NBEA NOT REPORTED 05/22/2015    PBEA 3 05/22/2015    IUD4ATZ 29 05/22/2015    BYLU6OMK 98 05/22/2015    FIO2 30.0 05/22/2015     Lab Results   Component Value Date/Time    SPECIAL RAC 12ML 02/26/2022 10:00 AM     Lab Results   Component Value Date/Time    CULTURE POSITIVE Blood Culture 02/26/2022 10:16 AM    CULTURE  02/26/2022 10:16 AM     DIRECT GRAM STAIN FROM BOTTLE: GRAM POSITIVE COCCI IN CLUSTERS    CULTURE (A) 02/26/2022 10:16 AM     STAPHYLOCOCCUS AUREUS For susceptibility, refer to previous culture.     CULTURE  02/26/2022 10:16 AM     (NOTE) Direct Gram Stain from bottle result called to and read back by: HODAN Randolph on 2/27/2022 Anemia of chronic disease 2/26/2022 Yes    CAD (coronary artery disease) 2/26/2022 Yes    Overview Signed 10/18/2020  8:28 AM by MD dr Reggie Snyder cardiologist recently aicd check /clearance 4/15               Plan:        1. IV ancef for mssa sepsis  2. Check 2d echo to assess for valvular infection/icd infection: awaiting results  3. Stop ivf  4. Dc'd eliquis-he says he no longer takes this and is refusing it.  started subq heparin for dvt prophylaxis  5.  Hold parameters on proamitine    Montez YARBROUGH Blood, DO  2/28/2022  1:03 PM

## 2022-03-01 LAB
ABSOLUTE EOS #: 0.34 K/UL (ref 0–0.4)
ABSOLUTE IMMATURE GRANULOCYTE: 0.07 K/UL (ref 0–0.3)
ABSOLUTE LYMPH #: 1.47 K/UL (ref 1–4.8)
ABSOLUTE MONO #: 1.54 K/UL (ref 0.2–0.8)
ANION GAP SERPL CALCULATED.3IONS-SCNC: 16 MMOL/L (ref 9–17)
BASOPHILS # BLD: 0 %
BASOPHILS ABSOLUTE: 0 K/UL (ref 0–0.2)
BUN BLDV-MCNC: 58 MG/DL (ref 8–23)
BUN/CREAT BLD: 4 (ref 9–20)
CALCIUM SERPL-MCNC: 10.1 MG/DL (ref 8.6–10.4)
CHLORIDE BLD-SCNC: 92 MMOL/L (ref 98–107)
CO2: 26 MMOL/L (ref 20–31)
CREAT SERPL-MCNC: 14.63 MG/DL (ref 0.7–1.2)
EOSINOPHILS RELATIVE PERCENT: 5 % (ref 1–4)
GFR AFRICAN AMERICAN: 4 ML/MIN
GFR NON-AFRICAN AMERICAN: 3 ML/MIN
GFR SERPL CREATININE-BSD FRML MDRD: ABNORMAL ML/MIN/{1.73_M2}
GLUCOSE BLD-MCNC: 84 MG/DL (ref 70–99)
HCT VFR BLD CALC: 26.8 % (ref 40.7–50.3)
HEMOGLOBIN: 8.6 G/DL (ref 13–17)
IMMATURE GRANULOCYTES: 1 %
LYMPHOCYTES # BLD: 22 % (ref 24–44)
MAGNESIUM: 2.7 MG/DL (ref 1.6–2.6)
MCH RBC QN AUTO: 30.4 PG (ref 25.2–33.5)
MCHC RBC AUTO-ENTMCNC: 32.1 G/DL (ref 28.4–34.8)
MCV RBC AUTO: 94.7 FL (ref 82.6–102.9)
MONOCYTES # BLD: 23 % (ref 1–7)
NRBC AUTOMATED: 0 PER 100 WBC
PDW BLD-RTO: 13.9 % (ref 11.8–14.4)
PHOSPHORUS: 3.3 MG/DL (ref 2.5–4.5)
PLATELET # BLD: 130 K/UL (ref 138–453)
PMV BLD AUTO: 12.5 FL (ref 8.1–13.5)
POTASSIUM SERPL-SCNC: 4.8 MMOL/L (ref 3.7–5.3)
RBC # BLD: 2.83 M/UL (ref 4.21–5.77)
SEG NEUTROPHILS: 49 % (ref 36–66)
SEGMENTED NEUTROPHILS ABSOLUTE COUNT: 3.28 K/UL (ref 1.8–7.7)
SODIUM BLD-SCNC: 134 MMOL/L (ref 135–144)
WBC # BLD: 6.7 K/UL (ref 3.5–11.3)

## 2022-03-01 PROCEDURE — 2580000003 HC RX 258: Performed by: NURSE PRACTITIONER

## 2022-03-01 PROCEDURE — 6360000002 HC RX W HCPCS: Performed by: INTERNAL MEDICINE

## 2022-03-01 PROCEDURE — 99232 SBSQ HOSP IP/OBS MODERATE 35: CPT | Performed by: NURSE PRACTITIONER

## 2022-03-01 PROCEDURE — 2060000000 HC ICU INTERMEDIATE R&B

## 2022-03-01 PROCEDURE — 90935 HEMODIALYSIS ONE EVALUATION: CPT

## 2022-03-01 PROCEDURE — 84100 ASSAY OF PHOSPHORUS: CPT

## 2022-03-01 PROCEDURE — 2500000003 HC RX 250 WO HCPCS: Performed by: INTERNAL MEDICINE

## 2022-03-01 PROCEDURE — 6370000000 HC RX 637 (ALT 250 FOR IP): Performed by: NURSE PRACTITIONER

## 2022-03-01 PROCEDURE — 97110 THERAPEUTIC EXERCISES: CPT

## 2022-03-01 PROCEDURE — 2580000003 HC RX 258: Performed by: INTERNAL MEDICINE

## 2022-03-01 PROCEDURE — 97116 GAIT TRAINING THERAPY: CPT

## 2022-03-01 PROCEDURE — 97530 THERAPEUTIC ACTIVITIES: CPT

## 2022-03-01 PROCEDURE — 85025 COMPLETE CBC W/AUTO DIFF WBC: CPT

## 2022-03-01 PROCEDURE — 99232 SBSQ HOSP IP/OBS MODERATE 35: CPT | Performed by: INTERNAL MEDICINE

## 2022-03-01 PROCEDURE — 36415 COLL VENOUS BLD VENIPUNCTURE: CPT

## 2022-03-01 PROCEDURE — 6370000000 HC RX 637 (ALT 250 FOR IP): Performed by: INTERNAL MEDICINE

## 2022-03-01 PROCEDURE — 83735 ASSAY OF MAGNESIUM: CPT

## 2022-03-01 PROCEDURE — 80048 BASIC METABOLIC PNL TOTAL CA: CPT

## 2022-03-01 PROCEDURE — 5A1D70Z PERFORMANCE OF URINARY FILTRATION, INTERMITTENT, LESS THAN 6 HOURS PER DAY: ICD-10-PCS | Performed by: INTERNAL MEDICINE

## 2022-03-01 RX ORDER — SODIUM CITRATE 4 % (5 ML)
2.6 SYRINGE (ML) MISCELLANEOUS PRN
Status: DISCONTINUED | OUTPATIENT
Start: 2022-03-01 | End: 2022-03-04 | Stop reason: HOSPADM

## 2022-03-01 RX ADMIN — CEFAZOLIN SODIUM 1000 MG: 1 INJECTION, POWDER, FOR SOLUTION INTRAMUSCULAR; INTRAVENOUS at 17:25

## 2022-03-01 RX ADMIN — HEPARIN SODIUM 5000 UNITS: 5000 INJECTION INTRAVENOUS; SUBCUTANEOUS at 21:41

## 2022-03-01 RX ADMIN — ASPIRIN 81 MG CHEWABLE TABLET 81 MG: 81 TABLET CHEWABLE at 17:22

## 2022-03-01 RX ADMIN — SODIUM CHLORIDE, PRESERVATIVE FREE 10 ML: 5 INJECTION INTRAVENOUS at 21:42

## 2022-03-01 RX ADMIN — HEPARIN SODIUM 5000 UNITS: 5000 INJECTION INTRAVENOUS; SUBCUTANEOUS at 06:35

## 2022-03-01 RX ADMIN — Medication 2.6 ML: at 16:09

## 2022-03-01 RX ADMIN — Medication 2.6 ML: at 16:08

## 2022-03-01 RX ADMIN — SODIUM CHLORIDE, PRESERVATIVE FREE 5 ML: 5 INJECTION INTRAVENOUS at 09:20

## 2022-03-01 RX ADMIN — MIDODRINE HYDROCHLORIDE 10 MG: 10 TABLET ORAL at 17:21

## 2022-03-01 RX ADMIN — HEPARIN SODIUM 5000 UNITS: 5000 INJECTION INTRAVENOUS; SUBCUTANEOUS at 14:02

## 2022-03-01 RX ADMIN — LANTHANUM CARBONATE 1000 MG: 500 TABLET, CHEWABLE ORAL at 17:22

## 2022-03-01 ASSESSMENT — ENCOUNTER SYMPTOMS
RESPIRATORY NEGATIVE: 1
GASTROINTESTINAL NEGATIVE: 1

## 2022-03-01 ASSESSMENT — PAIN DESCRIPTION - ORIENTATION
ORIENTATION: RIGHT
ORIENTATION: RIGHT

## 2022-03-01 ASSESSMENT — PAIN DESCRIPTION - DESCRIPTORS
DESCRIPTORS: DULL
DESCRIPTORS: DULL

## 2022-03-01 ASSESSMENT — PAIN SCALES - GENERAL
PAINLEVEL_OUTOF10: 4
PAINLEVEL_OUTOF10: 0

## 2022-03-01 ASSESSMENT — PAIN DESCRIPTION - LOCATION
LOCATION: LEG
LOCATION: LEG

## 2022-03-01 NOTE — PROGRESS NOTES
Physical Therapy  Facility/Department: [facility] PROGRESSIVE CARE  Daily Treatment Note  NAME: Jn Madrigal  : 1960  MRN: [id number]    Date of Service: 3/1/2022    Discharge Recommendations:  Patient would benefit from continued therapy after discharge     Assessment   Body structures, Functions, Activity limitations: Decreased strength;Decreased safe awareness;Decreased endurance;Decreased sensation;Decreased balance  Assessment: Pt limited by poor activity tolerance and decreased strength. Pt is not ready to mentally accept using walker, although reported he has a 4WW at home. Pt verbalized understanding of beneifts of walker. Therapist provided education on seated exercises, safety, and importance of walker. Prognosis: Good  Decision Making: High Complexity  REQUIRES PT FOLLOW UP: Yes  Activity Tolerance  Activity Tolerance: Patient limited by fatigue;Patient limited by endurance     Patient Diagnosis(es): The primary encounter diagnosis was Shortness of breath. Diagnoses of Prolonged Q-T interval on ECG and Septicemia [facility]) were also pertinent to this visit. has a past medical history of A-V fistula ([facility] [address] AICD (automatic cardioverter/defibrillator) present, Asthma, Atrial fibrillation ([address] Blood transfusion reaction, CAD (coronary artery disease), CHF (congestive heart failure) ([address] CHF (congestive heart failure) ([address] COPD (chronic obstructive pulmonary disease) ([address] CRF (chronic renal failure), Diabetes mellitus ([address] Dialysis patient ([address] GERD (gastroesophageal reflux disease), Hemodialysis patient ([address] Hypertension, and Obesity. has a past surgical history that includes vascular surgery; Cardiac defibrillator placement (); pacemaker placement; and IR TUNNELED CVC PLACE WO SQ PORT/PUMP > 5 YEARS (2022).     Restrictions  Restrictions/Precautions  Restrictions/Precautions: General Precautions,Fall Risk  Required Braces or Orthoses?: No  Subjective   General  Chart Reviewed: Yes  Family / Caregiver Present: No  General Comment  Comments: Okay per PT per RN Leeann  Pain Screening  Patient Currently in Pain: Yes  Pain Assessment  Pain Assessment: 0-10  Pain Level: 4  Pain Location: Leg  Pain Orientation: Right  Pain Descriptors: Dull  Vital Signs  Patient Currently in Pain: Yes  Oxygen Therapy  O2 Device: None (Room air)   Orientation  Orientation  Overall Orientation Status: Within Normal Limits  Cognition   Pt was not able to recall therapist name in middle of session, requiring therapist to prompt pt, however pt able to recall at end of session without requiring intervention from therapist.   Objective   Bed mobility  Rolling: Independent  Supine to Sit: Independent  Sit to Supine: Unable to assess; Pt retired to bedside chair at end of session  Scooting: Independent  Comment: Pt demo independence with bed mobility this date with bed flat, proper use of bedrails and needed no verbal or tactile cues from therapist.   Transfers  Sit to Stand: Stand by assistance  Stand to sit: Stand by assistance  Comment: Pt required SBA for transfers d/t safety. Pt verbalized feeling dizzy upon sitting EOB. Therapist educated on importance of taking time when changing positions. Pt verbalized understanding. Pt requried no verbal or tactile cues for proper hand placement during stand<>sit. Ambulation  Ambulation?: Yes  Ambulation 1  Surface: level tile  Device: No Device  Assistance: Contact guard assistance  Gait Deviations: Slow Karyn  Distance: 60ft  Comments: Pt ambulated in room and then to bedside chair. Pt became unsteady and used the wall when turning to his right stating when he turns too fast he gets dizzy. Therapist provided education of importance of pacing, not moving too fast and taking time, especially when feeling dizzy. Pt verbalized understanding.    Stairs/Curb  Stairs?: No  Balance  Posture: Good  Sitting - Static: Good  Sitting - Dynamic: Good  Standing - Static: Good;-  Standing - Dynamic: Fair;+  Exercises  Heelslides: 10x (Seated)  Hip Abduction: 10x  Knee Long Arc Quad: 10x (Seated)  Ankle Pumps: 10x  (Seated)   AM-PAC Score  AM-PAC Inpatient Mobility Raw Score : 20 (03/01/22 1315)  AM-PAC Inpatient T-Scale Score : 47.67 (03/01/22 1315)  Mobility Inpatient CMS 0-100% Score: 35.83 (03/01/22 1315)  Mobility Inpatient CMS G-Code Modifier : CJ (03/01/22 1315)  Goals  Short term goals  Time Frame for Short term goals: 12 treatments  Short term goal 1: Independent ambulation 200' x 1  Short term goal 2: Independently ascend/descend 7 steps without HR  Short term goal 3: Good standing balance  Short term goal 4: Tolerate 30 min ther act  Patient Goals   Patient goals : Have procedure and go home  Plan    Plan  Times per week: 1-2x/day,5-6 days/week  Current Treatment Recommendations: Transfer Training,Balance Training,Stair training,Gait Training,Endurance Training  Safety Devices  Type of devices: Gait belt,Left in chair,Call light within reach,Nurse notified,All fall risk precautions in place (Okay for no chair alarm per [employer])     Therapy Time   Individual Concurrent Group Co-treatment   Time In [address] [place] [address]         Time Out [facility] Student Physical Therapist Assistant    Clinical instructor reviewed the clinical documentation and is responsible for the care provided under the therapy plan of care.

## 2022-03-01 NOTE — PROGRESS NOTES
Occupational Therapy  Facility/Department: [facility] PROGRESSIVE CARE  Daily Treatment Note  NAME: Mahesh Willis  : 1960  MRN: [id number]    Date of Service: 3/1/2022   RN [facility] reports patient is medically stable for therapy treatment this date. Chart reviewed prior to treatment and patient is agreeable for therapy. All lines intact and patient positioned comfortably at end of treatment. All patient needs addressed prior to ending therapy session. Discharge Recommendations:  Home with assist PRN       Assessment   Performance deficits / Impairments: Decreased functional mobility ; Decreased ADL status; Decreased safe awareness;Decreased endurance;Decreased balance;Decreased high-level IADLs  Assessment: Pt would benefit from continuation of skilled OT until d/c for increasing overall endurance and safety for safe return home. Prognosis: Good  OT Education: OT Role;Plan of Care;ADL Adaptive Strategies;Transfer Training;Energy Conservation;Home Exercise Program;Precautions  Patient Education: pursed lip breathing, postural control, benefits of AD use during mobility, benefits of performing tasks seated for EC/WS and fall prevention, home safety, IADL safety, UB theraband exercises and completion on own. Pt verbalized understanding but not willing to complete UB theraband exercises on own  REQUIRES OT FOLLOW UP: Yes  Activity Tolerance  Activity Tolerance: Patient Tolerated treatment well  Safety Devices  Safety Devices in place: Yes  Type of devices: Left in chair;Call light within reach;Nurse notified  Equipment Recommendations  Equipment Needed: Yes  Walker: Rolling  Shower Chair: X         Patient Diagnosis(es): The primary encounter diagnosis was Shortness of breath. Diagnoses of Prolonged Q-T interval on ECG and Septicemia [facility]) were also pertinent to this visit.       has a past medical history of A-V fistula ([facility] [address] AICD (automatic cardioverter/defibrillator) present, Asthma, Atrial fibrillation ([facility] [address] Blood transfusion reaction, CAD (coronary artery disease), CHF (congestive heart failure) ([facility] [address] CHF (congestive heart failure) ([facility] [address] COPD (chronic obstructive pulmonary disease) ([facility] [address] CRF (chronic renal failure), Diabetes mellitus ([facility] [address] Dialysis patient ([facility] [address] GERD (gastroesophageal reflux disease), Hemodialysis patient ([facility] [address] Hypertension, and Obesity. has a past surgical history that includes vascular surgery; Cardiac defibrillator placement (2006); pacemaker placement; and IR TUNNELED CVC PLACE WO SQ PORT/PUMP > 5 YEARS (2/28/2022). Restrictions  Restrictions/Precautions  Restrictions/Precautions: General Precautions,Fall Risk  Required Braces or Orthoses?: No  Subjective   General  Patient assessed for rehabilitation services?: Yes  Family / Caregiver Present: No  Referring Practitioner: Brayan Preston  Diagnosis: SOB  Subjective  Subjective: \"Just give me a minute to finish my ice cream\"  General Comment  Comments: Pt sitting up in recliner agreeable to OT treatment      Orientation  Orientation  Overall Orientation Status: Within Functional Limits  Objective             Balance  Sitting Balance: Independent  Standing Balance: Stand by assistance  Standing Balance  Time: Approx 3 min  Activity: window <> door x3 retiring to recliner  Functional Mobility  Functional - Mobility Device: No device  Assist Level: Stand by assistance  Functional Mobility Comments: Pt verbalizing today that he does use a single point cane at home. Pt ambulated well today w/o any LOBs but writer recommended pt use cane at all times when up at home for increased safety. Pt agreed. Min vc's for pacing, slow/controlled movement, visual scanning, upright posture, pursed lip breathing, and overall safety. Bed mobility  Comment: N/T pt seated in recliner at start of session and retired to at end of session.   Transfers  Sit to stand: Stand by assistance  Stand to sit: Stand by assistance  Transfer Comments: Min vc's for slow/controlled sit/stands, squaring self and reaching back prior to sitting, pursed lip breathing, upright posture, and pacing for increased safety/decreased risk of falls. Pt very aware of deficits and agreeable to edu. Pt verbalizing he performs safely at home and takes the proper measures when dealing with EC/WS because he wants to be as safe as he can so he does not have to come back to the hospital.                                           Type of ROM/Therapeutic Exercise  Type of ROM/Therapeutic Exercise: Resistive Bands  Comment: Pt introduced to UB theraband exercises for increasing UB for increased IND with transfers/ADLs/daily tasks. Exercises  Scapular Protraction: x10  Scapular Retraction: x10  Shoulder Flexion: x10  Shoulder Extension: x10  Horizontal ABduction: x10  Horizontal ADduction: x10  Elbow Flexion: x10  Elbow Extension: x10  Other: PNF D2 x10                    Plan   Plan  Times per week: 3-5  Current Treatment Recommendations: Balance Training,Functional Mobility Training,Endurance Training,Safety Education & Training,Patient/Caregiver Education & Training,Equipment Evaluation, Education, & procurement,Self-Care / ADL                                                  AM-PAC Score        AM-[facility] Inpatient Daily Activity Raw Score: 19 (03/01/22 1748)  -PAC Inpatient ADL T-Scale Score : 40.22 (03/01/22 1748)  ADL Inpatient CMS 0-100% Score: 42.8 (03/01/22 1748)  ADL Inpatient CMS G-Code Modifier : CK (03/01/22 1748)    Goals  Short term goals  Time Frame for Short term goals: Within 10 sessions, pt will. ..   Short term goal 1: demo mod I and safety with transfers and functional mobility using DME PRN  Short term goal 2: demo mod I and safety with bathing, dressing, toileting, and grooming at   Short term goal 3: improve activity tolerance to engage in 20-30 minutes of dynamic seated/standing activity  Short term goal 4: demo G understanding and implementation of EC/WC, safety awareness, AE, and DME PRN during engagement in ADLs/IADLs  Short term goal 5: demo mod I and safety with IADLs with rest breaks PRN       Therapy Time   Individual Concurrent Group Co-treatment   Time In 1646         Time Out 1712         Minutes [address], [place]

## 2022-03-01 NOTE — PROGRESS NOTES
Renal Progress Note    Patient :  Nnamdi Landeros; 62 y.o. MRN# [id number]  Location:  6103/4756-80  Attending:  Ludmila Zimmer DO  Admit Date:  2/26/2022   Hospital Day: 3      Subjective:     Patient was seen and examine on HD. Tolerating the procedure well. No new issues reported overnight. Status post tunneled catheter placement by IR 2/28/2022. Has positive MSSA sepsis, on IV antibiotics and infectious diseases on board. Normally gets dialysis as per TTS schedule. 2D echo 2/28/2022: EF 40 to 45%. Evidence of diastolic dysfunction. Mild tricuspid regurgitation. Mild pulmonary hypertension RVSP 43. Mild to moderate left ventricular hypertrophy. Outpatient Medications:     Medications Prior to Admission: gabapentin (NEURONTIN) 300 MG capsule, Take 300 mg by mouth daily as needed. Methoxy PEG-Epoetin Beta (MIRCERA) 30 MCG/0.3ML SOSY, Inject 30 mcg as directed every 14 days Patient takes this with dialysis every 2 weeks  Cholecalciferol (VITAMIN D3) 1.25 MG (65420 UT) CAPS, Take 1.25 capsules by mouth See Admin Instructions Patient takes this medication 3 times a week with dialysis  methocarbamol (ROBAXIN) 750 MG tablet, Take 750 mg by mouth 3 times daily as needed   lanthanum (FOSRENOL) 1000 MG chewable tablet, Take 1,000 mg by mouth 3 times daily (with meals)   metoprolol succinate (TOPROL XL) 50 MG extended release tablet, Take 50 mg by mouth daily   aspirin 81 MG chewable tablet, Take 81 mg by mouth daily  midodrine (PROAMATINE) 10 MG tablet, Take 10 mg by mouth 2 times daily as needed (at dialysis as needed)   cinacalcet (SENSIPAR) 60 MG tablet, 60 mg every Tuesday Thursday and Saturday with dialysis (Patient taking differently: Take 60 mg by mouth three times a week 60 mg every Tuesday Thursday and Saturday with dialysis)  [DISCONTINUED] ceFAZolin (ANCEF) 1 g injection, 3 gm with each dialysis session.  End date of 10/30/20    Current Medications:     Scheduled Meds:    metoprolol succinate  50 mg Oral Daily    heparin (porcine)  5,000 Units SubCUTAneous 3 times per day    ceFAZolin  1,000 mg IntraVENous Daily    aspirin  81 mg Oral Daily    chrissie-pito  1 tablet Oral Daily    lanthanum  1,000 mg Oral TID WC    midodrine  10 mg Oral TID WC    sodium chloride flush  5-40 mL IntraVENous 2 times per day     Continuous Infusions:    sodium chloride       PRN Meds:  perflutren lipid microspheres, ondansetron, sodium chloride flush, sodium chloride, acetaminophen **OR** acetaminophen    Input/Output:       I/O last 3 completed shifts:  In: -   Out: 3 [Urine:3]. Patient Vitals for the past 96 hrs (Last 3 readings):   Weight   22 0409 134 lb 1 oz (60.8 kg)   22 1715 285 lb (129.3 kg)       Vital Signs:   Temperature:  Temp: 97.9 °F (36.6 °C)  TMax:   Temp (24hrs), Av.9 °F (36.6 °C), Min:97.7 °F (36.5 °C), Max:98.1 °F (36.7 °C)    Respirations:  Resp: 16  Pulse:   Pulse: 72  BP:    BP: 132/72  BP Range: Systolic (99FKH), MPW:014 , Min:132 , MBH:518       Diastolic (36KPD), OAR:63, Min:55, Max:84      Physical Examination:     General:  AAO x 3, speaking in full sentences, no accessory muscle use. HEENT: Atraumatic, normocephalic, no throat congestion, moist mucosa. Eyes:   Pupils equal, round and reactive to light, EOMI. Neck:   Supple  Chest:   Bilateral vesicular breath sounds, no rales or wheezes. Cardiac:  S1 S2 RR, no murmurs, gallops or rubs. Abdomen: Soft, non-tender, no masses or organomegaly, BS audible. :   No suprapubic or flank tenderness. Neuro:  AAO x 3, No FND. SKIN:  No rashes, good skin turgor. Extremities:  Trace edema.     Labs:       Recent Labs     22  1000 22  0606   WBC 15.4* 6.7   RBC 3.24* 2.83*   HGB 10.0* 8.6*   HCT 30.8* 26.8*   MCV 95.1 94.7   MCH 30.9 30.4   MCHC 32.5 32.1   RDW 13.9 13.9   * 130*   MPV 11.7 12.5      BMP:   Recent Labs     22  1000 22  0614 22  0606   * 132* 134*   K 4.5 5.2 4.8   CL 90* 92* 92*   CO2 27 26 26   BUN 25* 39* 58*   CREATININE 7.91* 10.83* 14.63*   GLUCOSE 98 85 84   CALCIUM 9.5 9.6 10.1      Phosphorus:     Recent Labs     03/01/22  0606   PHOS 3.3     Magnesium:    Recent Labs     03/01/22  0606   MG 2.7*     Albumin:    Recent Labs     02/26/22  1000 02/27/22  0614   LABALBU 4.1 3.4*     BNP:      Lab Results   Component Value Date    BNP NOT REPORTED 08/21/2014     SPEP:  Lab Results   Component Value Date    PROT 7.0 02/27/2022       Radiology:     Reviewed. Assessment:     1. ESRD on HD on TTS schedule at [facility] hemodialysis unit via tunnel catheter under Dr. Leila Rosario.  2.  MSSA sepsis. 3. Anemia of chronic disease. 4. Secondary hyperparathyroidism. 5. Hypertension. 6. Coronary artery disease. 7. Ischemic cardiomyopathy status post AICD. 8. Diabetes type 2. Plan:   1. Patient was seen and examined on HD at bedside. Orders were confirmed with the HD nurse. 2. Antibiotics as per infectious disease per ESRD dosing. 3. Okay to discharge from nephrology standpoint postdialysis today. Nutrition   Please ensure that patient is on a renal diet/TF. Avoid nephrotoxic drugs/contrast exposure. We will continue to follow along with you. Vineet Landon MD  [facility]     This note is created with the assistance of a speech-recognition program. While intending to generate a document that actually reflects the content of the visit, no guarantees can be provided that every mistake has been identified and corrected by editing.

## 2022-03-01 NOTE — FLOWSHEET NOTE
03/01/22 1218   Vital Signs   BP (!) 156/84   Pulse 61   Resp 20   Weight 280 lb 8 oz (127.2 kg)   Weight Method Bed scale   Percent Weight Change 109.23   Treatment   Time On 1224   Treatment Goal 2000 ml as tolerated   Observations & Evaluations   Level of Consciousness Alert (0)   Oriented X AOx4   Heart Rhythm Regular   Respiratory Quality/Effort Unlabored   O2 Device None (Room air)   Bilateral Breath Sounds Diminished   Skin Color Appropriate for ethnicity   Skin Condition/Temp Dry;Flaky   Abdomen Inspection Rounded; Soft   Edema Right upper extremity; Left upper extremity;Right lower extremity; Left lower extremity   RUE Edema +2;Non-pitting   LUE Edema +2;Non-pitting   RLE Edema +2;Non-pitting   LLE Edema +2;Non-pitting   Comments HD orders verified, dialysate confirmed, pt AOx4, vitals stable   Technical Checks   Dialysis Machine No. [id number]   RO Machine No. [id number]   RO Machine Log Sheet Completed Yes   Machine Alarm Self Test Completed; Passed   Machine Autotest Completed; Passed   Air Foam Detector Tested;Proper Function;pH Reading   Extracorporeal Circuit Tested for Integrity Yes   Machine Conductivity 13.8   Machine Conductivity # 13.6   Manual Conductivity 13.8   Machine Ph 7.4   Manual Ph 7.4   Bleach Test (Neg) Yes   Bath Temperature 96.8 °F (36 °C)   Treatment Initiation   Dialyze Hours 3.5   Treatment  Initiation Universal Precautions maintained;Lines secured to patient; Connections secured;Prime given;Venous Parameters set; Arterial Parameters set; Air foam detector engaged; Hemosafe Device; Dialysate;Saline line double clamped; Hemo-Safe Applied;Dialyzer; Revaclear Dialyzer;F180   Dialysis Bath   K+ (Potassium) 2   Ca+ (Calcium) 2.5   Na+ (Sodium) 137   HCO3 (Bicarb) 35

## 2022-03-01 NOTE — PLAN OF CARE
Problem: Falls - Risk of:  Goal: Will remain free from falls  Description: Will remain free from falls  3/1/2022 0202 by Efraín Rush RN  Outcome: Ongoing     Problem: Falls - Risk of:  Goal: Absence of physical injury  Description: Absence of physical injury  3/1/2022 0202 by Efraín Rush RN  Outcome: Ongoing     Problem: Cardiac:  Goal: Ability to maintain vital signs within normal range will improve  Description: Ability to maintain vital signs within normal range will improve  3/1/2022 0202 by Efraín Rush RN  Outcome: Ongoing     Problem: Cardiac:  Goal: Cardiovascular alteration will improve  Description: Cardiovascular alteration will improve  3/1/2022 0202 by Efraín Rush RN  Outcome: Ongoing     Problem: Health Behavior:  Goal: Will modify at least one risk factor affecting health status  Description: Will modify at least one risk factor affecting health status  3/1/2022 0202 by Efraín Rush RN  Outcome: Ongoing     Problem: Health Behavior:  Goal: Identification of resources available to assist in meeting health care needs will improve  Description: Identification of resources available to assist in meeting health care needs will improve  3/1/2022 0202 by Efraín Rush RN  Outcome: Ongoing     Problem: Physical Regulation:  Goal: Complications related to the disease process, condition or treatment will be avoided or minimized  Description: Complications related to the disease process, condition or treatment will be avoided or minimized  3/1/2022 0202 by Efraín Rush RN  Outcome: Ongoing     Problem: Discharge Planning:  Goal: Discharged to appropriate level of care  Description: Discharged to appropriate level of care  3/1/2022 0202 by Efraín Rush RN  Outcome: Ongoing     Problem: Gas Exchange - Impaired:  Goal: Levels of oxygenation will improve  Description: Levels of oxygenation will improve  3/1/2022 0202 by Efraín Rush RN  Outcome: Ongoing     Problem: Infection, Septic Shock:  Goal: Will show no infection signs and symptoms  Description: Will show no infection signs and symptoms  3/1/2022 0202 by Brannon Amin RN  Outcome: Ongoing     Problem: Infection - Ventilator-Associated Pneumonia:  Goal: Absence of pulmonary infection  Description: Absence of pulmonary infection  3/1/2022 0202 by Brannon Amin RN  Outcome: Ongoing     Problem: Serum Glucose Level - Abnormal:  Goal: Ability to maintain appropriate glucose levels will improve  Description: Ability to maintain appropriate glucose levels will improve  3/1/2022 0202 by Brannon Amin RN  Outcome: Ongoing     Problem: Tissue Perfusion, Altered:  Goal: Circulatory function within specified parameters  Description: Circulatory function within specified parameters  3/1/2022 0202 by Brannon Amin RN  Outcome: Ongoing     Problem: Venous Thromboembolism:  Goal: Will show no signs or symptoms of venous thromboembolism  Description: Will show no signs or symptoms of venous thromboembolism  3/1/2022 0202 by Brannon Amin RN  Outcome: Ongoing     Problem: Venous Thromboembolism:  Goal: Absence of signs or symptoms of impaired coagulation  Description: Absence of signs or symptoms of impaired coagulation  3/1/2022 0202 by Brannon Amin RN  Outcome: Ongoing

## 2022-03-01 NOTE — PROGRESS NOTES
[facility]  Office: [address], DO, Brianda Cedeno, DO, Aissatou Whiteport, DO, Marlo [facility], DO, Eugenia De Jesus MD, Earnestine Rothman MD, Ahsan Betancur MD, Nena Garzon MD, Celia Patterson MD, Georgie Welch MD, Luis Hurst MD, Alexandrea Snow, DO, Morenita Donnelly, DO, Ayo Barone MD,  Amber Up DO, Praveena Lugo MD, Gloria Sun MD, Edna Sanchez MD, Mercedes Luevano MD, Dain Shah MD, Federico Callahan, CNP, Carlos Bernal, CNP, Vikash Fernandez, CNP, Rocio Chambers, CNS, Mayi Mo, CNP, Rosita Alcantara, CNP, Eloy Osman, CNP, Lillie Richard, CNP, Alcon Chan, CNP, Kimberly Horn PA-C, Jason Williamson, MEÑO, Julian Lomax DNP, Peter Allen, CNP, Josephine Pulido, CNP, Michael Yost, CNP, Timo Man, CNP, Portillo Daniel, CNP, Agustín Morris[facility] Center[facility]    Progress Note    3/1/2022    10:29 AM    Name:   Sowmya Ruiz  MRN:     [id number]     Acct:      [de-identified]   Room:   [address] [place] [address] Day:  3  Admit Date:  2/26/2022  9:13 AM    PCP:   No primary care provider on file. Code Status:  Full Code    Subjective:     C/C:   Chief Complaint   Patient presents with    Fever     Interval History Status: improved. Patient is resting company, denies any complaints of chest pain, shortness of breath, nausea or vomiting, fevers or chills. Overall feels much better since admission    Brief History:     Per prior chart documentation  \"Jeremy Giraldo is a 61 y.o. Non- / non  male who presents with Fever   and is admitted to the hospital for the management of Sepsis ([facility] [address]      This 62 yom presents from dialysis center with fever to 103.  He was unaware he had a fever and his only symptoms have been fatigue (chronic), sob/looney, and 4 episodes of diarrhea yesterday.  He also noticed a little reddish drainage from HD cath in groin.  Denies c/s/s/cough/n/v/abd pain/cp/palpitations\"    Review of Systems: Constitutional:  negative for chills, fevers, sweats  Respiratory:  negative for cough, dyspnea on exertion, shortness of breath, wheezing  Cardiovascular:  negative for chest pain, chest pressure/discomfort, lower extremity edema, palpitations  Gastrointestinal:  negative for abdominal pain, constipation, diarrhea, nausea, vomiting  Neurological:  negative for dizziness, headache    Medications: Allergies: Allergies   Allergen Reactions    Spironolactone Itching and Other (See Comments)       Current Meds:   Scheduled Meds:    metoprolol succinate  50 mg Oral Daily    heparin (porcine)  5,000 Units SubCUTAneous 3 times per day    ceFAZolin  1,000 mg IntraVENous Daily    aspirin  81 mg Oral Daily    chrissie-pito  1 tablet Oral Daily    lanthanum  1,000 mg Oral TID WC    midodrine  10 mg Oral TID WC    sodium chloride flush  5-40 mL IntraVENous 2 times per day     Continuous Infusions:    sodium chloride       PRN Meds: perflutren lipid microspheres, ondansetron, sodium chloride flush, sodium chloride, acetaminophen **OR** acetaminophen    Data:     Past Medical History:   has a past medical history of 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 fistula ([facility] AICD (automatic cardioverter/defibrillator) present, Asthma, Atrial fibrillation ([facility] Blood transfusion reaction, CAD (coronary artery disease), CHF (congestive heart failure) ([facility] CHF (congestive heart failure) ([facility] COPD (chronic obstructive pulmonary disease) ([facility] CRF (chronic renal failure), Diabetes mellitus ([facility] Dialysis patient ([facility] GERD (gastroesophageal reflux disease), Hemodialysis patient ([facility] Hypertension, and Obesity. Social History:   reports that he has never smoked. He has never used smokeless tobacco. He reports that he does not drink alcohol and does not use drugs.      Family History:   Family History   Adopted: Yes       Vitals:  /72   Pulse 72   Temp 97.9 °F (36.6 °C)   Resp 16   Ht 6' 1\" (1.854 m)   Wt 134 lb 1 oz (60.8 kg)   SpO2 95% BMI 17.69 kg/m²   Temp (24hrs), Av.9 °F (36.6 °C), Min:97.7 °F (36.5 °C), Max:98.1 °F (36.7 °C)    Recent Labs     22  181   POCGLU 75       I/O (24Hr): Intake/Output Summary (Last 24 hours) at 3/1/2022 1029  Last data filed at 3/1/2022 0014  Gross per 24 hour   Intake --   Output 3 ml   Net -3 ml       Labs:  Hematology:  Recent Labs     22  0614 22  06   WBC  --  6.7   RBC  --  2.83*   HGB  --  8.6*   HCT  --  26.8*   MCV  --  94.7   MCH  --  30.4   MCHC  --  32.1   RDW  --  13.9   PLT  --  130*   MPV  --  12.5   INR 1.2  --      Chemistry:  Recent Labs     22  1225 22  1538 22  0606   NA  --   --  132* 134*   K  --   --  5.2 4.8   CL  --   --  92* 92*   CO2  --   --  26 26   GLUCOSE  --   --  85 84   BUN  --   --  39* 58*   CREATININE  --   --  10.83* 14.63*   MG  --   --   --  2.7*   ANIONGAP  --   --  14 16   LABGLOM  --   --  5* 3*   GFRAA  --   --  6* 4*   CALCIUM  --   --  9.6 10.1   PHOS  --   --   --  3.3   TROPHS 153* 163*  --   --      Recent Labs     22  1225 22   PROT  --  7.0  --    LABALBU  --  3.4*  --    AST  --  28  --    ALT  --  16  --      --   --    ALKPHOS  --  79  --    BILITOT  --  0.41  --    POCGLU  --   --  75     ABG:  Lab Results   Component Value Date    POCPH 7.43 2015    POCPCO2 42 2015    POCPO2 110 2015    POCHCO3 27.8 2015    NBEA NOT REPORTED 2015    PBEA 3 2015    FDT2BSH 29 2015    VQCX6FDG 98 2015    FIO2 30.0 2015     Lab Results   Component Value Date/Time    SPECIAL RAC 12ML 2022 10:00 AM     Lab Results   Component Value Date/Time    CULTURE POSITIVE Blood Culture 2022 10:16 AM    CULTURE  2022 10:16 AM     DIRECT GRAM STAIN FROM BOTTLE: GRAM POSITIVE COCCI IN CLUSTERS    CULTURE (A) 2022 10:16 AM     STAPHYLOCOCCUS AUREUS For susceptibility, refer to previous culture.     CULTURE  2022 10:16 AM     (NOTE) Direct Gram Stain from bottle result called to and read back by: HODAN Andrews on 2/27/2022 at 00:45 AND TO HORTENCIA PORTER 2-27-22 AT 2838       Radiology:  CT HEAD WO CONTRAST    Result Date: 2/26/2022  No acute intracranial abnormality. XR CHEST 1 VIEW    Result Date: 2/26/2022  No acute findings. IR TUNNELED CVC PLACE WO SQ PORT/PUMP > 5 YEARS    Result Date: 2/28/2022  Successful fluoroscopy guided tunneled catheter exchange, as above. CT CHEST ABDOMEN PELVIS WO CONTRAST    Result Date: 2/26/2022  1. No definite evidence for acute infective or inflammatory process. 2. A small focal area of pleural-parenchymal density at the right costophrenic angle in the lower lobe most suggestive of subsegmental atelectasis. Pneumonia considered much less likely. 3. Interval development of innumerable bilateral renal cysts with decrease in renal parenchymal tissue compatible with chronic renal parenchymal disease. 4. No abnormal fluid collections. 5. Right femoral vein central line terminating distally in the IVC. Physical Examination:        General appearance:  alert, cooperative and no distress  Mental Status:  oriented to person, place and time and normal affect  Lungs:  clear to auscultation bilaterally, normal effort  Heart:  regular rate and rhythm, no murmur  Abdomen:  soft, nontender, nondistended, normal bowel sounds, no masses, hepatomegaly, splenomegaly  Extremities:  no edema, redness, tenderness in the calves  Skin:  no gross lesions, rashes, induration    Assessment:        Hospital Problems           Last Modified POA    * (Principal) Sepsis due to methicillin susceptible Staphylococcus aureus (MSSA) without acute organ dysfunction ([address] 2/27/2022 Yes    Biventricular CHF (congestive heart failure) (HCC) with icd chronic  2/26/2022 Yes    Paroxysmal atrial fibrillation ([address] 2/26/2022 Yes    Overview Signed 2/3/2014  3:55 PM by Alfred Fishman V,      Chads 2 score. On ASA.  Pt has refused coumadin therapy in the past.          Controlled type 2 diabetes mellitus with chronic kidney disease on chronic dialysis, without long-term current use of insulin ([facility] [address] 2/26/2022 Yes    ESRD (end stage renal disease) on dialysis ([facility] [address] 2/26/2022 Yes    Anemia of chronic disease 2/26/2022 Yes    CAD (coronary artery disease) 2/26/2022 Yes    Overview Signed 10/18/2020  8:28 AM by MD dr Wai Esquivel cardiologist recently aicd check /clearance 4/15               Plan:        1. Continue present antibiotics  2. Dialysis per nephrology  3. Continue home cardiac medications  4. Insulin scale  5. GI and DVT prophylaxis  6. Trend labs, correct electrolytes as needed  7. Await repeat cultures  8.  Activity as tolerated    Mindy Echols DO  3/1/2022  10:29 AM

## 2022-03-01 NOTE — PROGRESS NOTES
[facility]  Progress Note    Shirin Hirsch  MRN: [id number]  Date: 3/1/2022  LOS: 3     Reason for F/U :   MSSA sepsis-    Impression :   1. MSSA sepsis-source unclear, concern is for a catheter associated infection, echocardiogram without evidence of valvular vegetation  2. End-stage renal disease on hemodialysis  3. Coronary artery disease with ischemic cardiomyopathy, status post AICD placement  4. Atrial fibrillation on anticoagulation  5. Diabetes mellitus type 2  6. Obesity    Recommendations:   · Continue IV antimicrobial therapy with cefazolin  · Patient had a 2D echocardiogram without evidence of valvular vegetation  · Right tunneled hemodialysis catheter was exchanged 2/28/2022  · Will repeat blood cultures  · We will continue to monitor clinical progress and adjust therapy accordingly    Infection Control Recommendations:   Universal precautions    Discharge Planning:   Estimated Length of IV antimicrobials: To be determined  Patient will need Midline Catheter Insertion/ PICC line Insertion: No  Patient will need: Home IV , [place],  SNF,  LTAC: Undetermined  Patient willneed outpatient wound care: No    Medical Decision making / Summary of Stay:   Bria Richard a 62y.o.-year-old male who was initially admitted on 2/26/2022.   Bay Dunaway has a history of end-stage renal disease and he has been on hemodialysis for about 5 years now. He does reports that about 2 weeks ago he did have his right-sided femoral tunneled hemodialysis catheter changed because he was having some bleeding issues for close to 2 weeks at that catheter site.   The patient was in his usual state of health until Friday he reports that he has some diarrhea about 4 loose bowel movements, headache and by Saturday morning he was feeling kind of tired when he went to hemodialysis he was noted to have a high fever.  He did not report any subjective fevers or chills.  No cough or shortness of shortness of breath.  No chest pains or palpitations.  No abdominal pain.  He did not have any bowel movements on Saturday despite having the diarrhea on Friday and he reports having two bowel movements today that were loose. The patient does have some vascular issues is followed by Dr. Yaritza Zazueta and he tells me he was scheduled for some surgery this week.     The patient was admitted and started on antimicrobial therapy and blood cultures done in the emergency room have come back 2 out of 2 sets with Staph aureus. I was asked to evaluate and help with antibiotic choice. Current evaluation:3/1/2022    /72   Pulse 72   Temp 97.9 °F (36.6 °C)   Resp 16   Ht 6' 1\" (1.854 m)   Wt 134 lb 1 oz (60.8 kg)   SpO2 95%   BMI 17.69 kg/m²     Temperature Range: Temp: 97.9 °F (36.6 °C) Temp  Av.9 °F (36.6 °C)  Min: 97.7 °F (36.5 °C)  Max: 98.1 °F (36.7 °C)    Patient was seen and evaluated sitting up at bedside getting ready to work with therapy  He denies any needs at this time, just states that he is tired and does have some generalized malaise    Review of Systems   Constitutional: Positive for fatigue. HENT: Negative. Respiratory: Negative. Cardiovascular: Negative. Gastrointestinal: Negative. Genitourinary: Negative. Musculoskeletal: Negative. Skin: Negative. Neurological: Negative. Psychiatric/Behavioral: Negative. Physical Examination :     Physical Exam  Constitutional:       Appearance: Normal appearance. He is obese. HENT:      Head: Normocephalic and atraumatic. Cardiovascular:      Rate and Rhythm: Normal rate and regular rhythm. Pulmonary:      Effort: Pulmonary effort is normal. No respiratory distress. Breath sounds: Normal breath sounds. Abdominal:      General: Abdomen is flat. Bowel sounds are normal. There is no distension. Palpations: Abdomen is soft. Musculoskeletal:         General: Normal range of motion.    Skin: General: Skin is warm and dry. Neurological:      General: No focal deficit present. Mental Status: He is alert and oriented to person, place, and time. Mental status is at baseline. Psychiatric:         Mood and Affect: Mood normal.         Behavior: Behavior normal.         Thought Content: Thought content normal.         Laboratory data:   I have independently reviewed the followinglabs:  CBC with Differential:   Recent Labs     02/26/22  1000 03/01/22  0606   WBC 15.4* 6.7   HGB 10.0* 8.6*   HCT 30.8* 26.8*   * 130*   LYMPHOPCT 7* 22*   MONOPCT 4 23*     BMP:   Recent Labs     02/27/22  0614 03/01/22  0606   * 134*   K 5.2 4.8   CL 92* 92*   CO2 26 26   BUN 39* 58*   CREATININE 10.83* 14.63*   MG  --  2.7*     Hepatic Function Panel:   Recent Labs     02/26/22  1000 02/27/22  0614   PROT 8.4* 7.0   LABALBU 4.1 3.4*   BILITOT 0.77 0.41   ALKPHOS 110 79   ALT 13 16   AST 16 28         Lab Results   Component Value Date    PROCAL >100.00 02/27/2022     Lab Results   Component Value Date    CRP 82.5 09/21/2019     Lab Results   Component Value Date    SEDRATE 46 (H) 08/21/2014         Lab Results   Component Value Date    DDIMER 4.96 05/20/2015    DDIMER 5.23 04/09/2015    DDIMER 1.14 08/22/2014    DDIMER 1.03 08/21/2014     Lab Results   Component Value Date    FERRITIN 265 01/30/2014     Lab Results   Component Value Date     02/26/2022     Lab Results   Component Value Date    FIBRINOGEN 477 02/26/2022    FIBRINOGEN 476 05/20/2015    FIBRINOGEN 697 04/09/2015       Results in Past 30 Days  Result Component Current Result Ref Range Previous Result Ref Range   SARS-CoV-2, Rapid Not Detected (2/26/2022) Not Detected Not in Time Range      Lab Results   Component Value Date    COVID19 Not Detected 02/26/2022    COVID19 Not Detected 10/17/2020       No results for input(s): VANCOTROUGH in the last 72 hours.     Imaging Studies:   ECHO  FINDINGS  Left Atrium  Left atrium is normal in size.  Left Ventricle  Mild to moderate left ventricular hypertrophy  Global left ventricular systolic function is mildly reduced  Estimated ejection fraction is 40-45 % . Evidence of diastolic dysfunction. Right Atrium  Right atrium is normal in size. Pacing lead seen in the right atrium. Right Ventricle  Normal right ventricular size and function. Pacemaker / ICD lead seen in right ventricle. Mitral Valve  Normal mitral valve structure and function. Mild mitral regurgitation. Aortic Valve  Aortic valve is sclerotic but opens well. No aortic insufficiency. Tricuspid Valve  Mild tricuspid regurgitation. Mild pulmonary hypertension. Pulmonic Valve  The pulmonic valve is normal in structure. No pulmonic insufficiency. Pericardial Effusion  No pericardial effusion seen. Summary  Mild to moderate left ventricular hypertrophy  Global left ventricular systolic function is mildly reduced  Estimated ejection fraction is 40-45 % . Evidence of diastolic dysfunction. Mild tricuspid regurgitation. Mild pulmonary hypertension, RVSP 43.     Signature  ----------------------------------------------------------------------------   Electronically signed by Estevan Michel(Interpreting physician) on 02/28/2022   02:27 PM  Cultures:     Culture, Blood 1 [[id number]] (Abnormal) Collected: 02/26/22 1016   Order Status: Completed Specimen: Blood Updated: 02/28/22 0826    Specimen Description . BLOOD 10 ML  RIGHT HAND    Culture POSITIVE Blood Culture     DIRECT GRAM STAIN FROM BOTTLE: GRAM POSITIVE COCCI IN CLUSTERS     STAPHYLOCOCCUS AUREUS For susceptibility, refer to previous culture. Abnormal      (NOTE) Direct Gram Stain from bottle result called to and read back by: HODAN SANABRIA on 2/27/2022 at 00:45 AND COLE PORTER 2-27-22 AT 0640   Culture, Blood 1 [[id number]] (Abnormal)  Collected: 02/26/22 1000   Order Status: Completed Specimen: Blood Updated: 02/28/22 0825    Specimen Description . BLOOD    Special Requests RAC 12ML    Culture POSITIVE Blood Culture     DIRECT GRAM STAIN FROM BOTTLE: GRAM POSITIVE COCCI IN CLUSTERS     Staphylococcus aureus Detected: mecA/C and MREJ Not Detected Methodology- Polymerase Chain Reaction (PCR)     STAPHYLOCOCCUS AUREUS Abnormal      (NOTE) Direct Gram Stain from bottle and Polymerase Chain Reaction (PCR) results called to and read back by: HODAN SANABRIA on 2/27/2022 at 00:45 AND COLE PORTER 2-27-22 AT 5738         Medications:      metoprolol succinate  50 mg Oral Daily    heparin (porcine)  5,000 Units SubCUTAneous 3 times per day    ceFAZolin  1,000 mg IntraVENous Daily    aspirin  81 mg Oral Daily    chrissie-pito  1 tablet Oral Daily    lanthanum  1,000 mg Oral TID WC    midodrine  10 mg Oral TID WC    sodium chloride flush  5-40 mL IntraVENous 2 times per day           [facility]  [place], [facility] [address] messaging  OFFICE: [phone]      Electronically signed by GYPSY Abdullahi CNP on 3/1/2022 at 8:38 AM  Thank you for allowing us to participate in the care of this patient. Please call with questions. This note iscreated with the assistance of a speech recognition program.  While intending to generate a document that actually reflects the content of the visit, the document can still have some errors including those of syntax andsound a like substitutions which may escape proof reading. In such instances, actual meaning can be extrapolated by contextual diversion.

## 2022-03-02 LAB
ABSOLUTE EOS #: 0.26 K/UL (ref 0–0.44)
ABSOLUTE IMMATURE GRANULOCYTE: 0.03 K/UL (ref 0–0.3)
ABSOLUTE LYMPH #: 1.82 K/UL (ref 1.1–3.7)
ABSOLUTE MONO #: 1.03 K/UL (ref 0.1–1.2)
ANION GAP SERPL CALCULATED.3IONS-SCNC: 12 MMOL/L (ref 9–17)
BASOPHILS # BLD: 0 % (ref 0–2)
BASOPHILS ABSOLUTE: <0.03 K/UL (ref 0–0.2)
BUN BLDV-MCNC: 36 MG/DL (ref 8–23)
BUN/CREAT BLD: 3 (ref 9–20)
CALCIUM SERPL-MCNC: 9.6 MG/DL (ref 8.6–10.4)
CHLORIDE BLD-SCNC: 95 MMOL/L (ref 98–107)
CO2: 28 MMOL/L (ref 20–31)
CREAT SERPL-MCNC: 10.71 MG/DL (ref 0.7–1.2)
EOSINOPHILS RELATIVE PERCENT: 4 % (ref 1–4)
GFR AFRICAN AMERICAN: 6 ML/MIN
GFR NON-AFRICAN AMERICAN: 5 ML/MIN
GFR SERPL CREATININE-BSD FRML MDRD: ABNORMAL ML/MIN/{1.73_M2}
GLUCOSE BLD-MCNC: 83 MG/DL (ref 70–99)
HCT VFR BLD CALC: 26.1 % (ref 40.7–50.3)
HEMOGLOBIN: 8.4 G/DL (ref 13–17)
IMMATURE GRANULOCYTES: 1 %
LYMPHOCYTES # BLD: 30 % (ref 24–43)
MAGNESIUM: 2.4 MG/DL (ref 1.6–2.6)
MCH RBC QN AUTO: 30.3 PG (ref 25.2–33.5)
MCHC RBC AUTO-ENTMCNC: 32.2 G/DL (ref 28.4–34.8)
MCV RBC AUTO: 94.2 FL (ref 82.6–102.9)
MONOCYTES # BLD: 17 % (ref 3–12)
NRBC AUTOMATED: 0 PER 100 WBC
PDW BLD-RTO: 13.9 % (ref 11.8–14.4)
PHOSPHORUS: 2.9 MG/DL (ref 2.5–4.5)
PLATELET # BLD: 147 K/UL (ref 138–453)
PMV BLD AUTO: 11.9 FL (ref 8.1–13.5)
POTASSIUM SERPL-SCNC: 4.3 MMOL/L (ref 3.7–5.3)
RBC # BLD: 2.77 M/UL (ref 4.21–5.77)
SEG NEUTROPHILS: 48 % (ref 36–65)
SEGMENTED NEUTROPHILS ABSOLUTE COUNT: 2.89 K/UL (ref 1.5–8.1)
SODIUM BLD-SCNC: 135 MMOL/L (ref 135–144)
WBC # BLD: 6 K/UL (ref 3.5–11.3)

## 2022-03-02 PROCEDURE — 6370000000 HC RX 637 (ALT 250 FOR IP): Performed by: INTERNAL MEDICINE

## 2022-03-02 PROCEDURE — 97110 THERAPEUTIC EXERCISES: CPT

## 2022-03-02 PROCEDURE — 97530 THERAPEUTIC ACTIVITIES: CPT

## 2022-03-02 PROCEDURE — 97116 GAIT TRAINING THERAPY: CPT

## 2022-03-02 PROCEDURE — 6360000002 HC RX W HCPCS: Performed by: INTERNAL MEDICINE

## 2022-03-02 PROCEDURE — 36415 COLL VENOUS BLD VENIPUNCTURE: CPT

## 2022-03-02 PROCEDURE — 80048 BASIC METABOLIC PNL TOTAL CA: CPT

## 2022-03-02 PROCEDURE — 2580000003 HC RX 258: Performed by: NURSE PRACTITIONER

## 2022-03-02 PROCEDURE — 6370000000 HC RX 637 (ALT 250 FOR IP): Performed by: NURSE PRACTITIONER

## 2022-03-02 PROCEDURE — 2060000000 HC ICU INTERMEDIATE R&B

## 2022-03-02 PROCEDURE — 83735 ASSAY OF MAGNESIUM: CPT

## 2022-03-02 PROCEDURE — 99232 SBSQ HOSP IP/OBS MODERATE 35: CPT | Performed by: INTERNAL MEDICINE

## 2022-03-02 PROCEDURE — 85025 COMPLETE CBC W/AUTO DIFF WBC: CPT

## 2022-03-02 PROCEDURE — 2580000003 HC RX 258: Performed by: INTERNAL MEDICINE

## 2022-03-02 PROCEDURE — 97535 SELF CARE MNGMENT TRAINING: CPT

## 2022-03-02 PROCEDURE — 87040 BLOOD CULTURE FOR BACTERIA: CPT

## 2022-03-02 PROCEDURE — 84100 ASSAY OF PHOSPHORUS: CPT

## 2022-03-02 RX ORDER — AMLODIPINE BESYLATE 5 MG/1
5 TABLET ORAL DAILY
Status: DISCONTINUED | OUTPATIENT
Start: 2022-03-02 | End: 2022-03-04 | Stop reason: HOSPADM

## 2022-03-02 RX ADMIN — HEPARIN SODIUM 5000 UNITS: 5000 INJECTION INTRAVENOUS; SUBCUTANEOUS at 20:17

## 2022-03-02 RX ADMIN — SODIUM CHLORIDE, PRESERVATIVE FREE 10 ML: 5 INJECTION INTRAVENOUS at 20:17

## 2022-03-02 RX ADMIN — METOPROLOL SUCCINATE 50 MG: 50 TABLET, EXTENDED RELEASE ORAL at 09:11

## 2022-03-02 RX ADMIN — SODIUM CHLORIDE, PRESERVATIVE FREE 10 ML: 5 INJECTION INTRAVENOUS at 09:11

## 2022-03-02 RX ADMIN — LANTHANUM CARBONATE 1000 MG: 500 TABLET, CHEWABLE ORAL at 17:37

## 2022-03-02 RX ADMIN — HEPARIN SODIUM 5000 UNITS: 5000 INJECTION INTRAVENOUS; SUBCUTANEOUS at 14:37

## 2022-03-02 RX ADMIN — LANTHANUM CARBONATE 1000 MG: 500 TABLET, CHEWABLE ORAL at 12:47

## 2022-03-02 RX ADMIN — CEFAZOLIN SODIUM 1000 MG: 1 INJECTION, POWDER, FOR SOLUTION INTRAMUSCULAR; INTRAVENOUS at 18:21

## 2022-03-02 RX ADMIN — LANTHANUM CARBONATE 1000 MG: 500 TABLET, CHEWABLE ORAL at 09:11

## 2022-03-02 RX ADMIN — HEPARIN SODIUM 5000 UNITS: 5000 INJECTION INTRAVENOUS; SUBCUTANEOUS at 05:52

## 2022-03-02 RX ADMIN — ASPIRIN 81 MG CHEWABLE TABLET 81 MG: 81 TABLET CHEWABLE at 09:11

## 2022-03-02 RX ADMIN — AMLODIPINE BESYLATE 5 MG: 5 TABLET ORAL at 17:33

## 2022-03-02 ASSESSMENT — ENCOUNTER SYMPTOMS
ALLERGIC/IMMUNOLOGIC NEGATIVE: 1
RESPIRATORY NEGATIVE: 1
GASTROINTESTINAL NEGATIVE: 1

## 2022-03-02 ASSESSMENT — PAIN SCALES - GENERAL
PAINLEVEL_OUTOF10: 0

## 2022-03-02 NOTE — FLOWSHEET NOTE
03/01/22 1613   Vital Signs   /75   Pulse 70   Resp 18   Weight 276 lb 0.3 oz (125.2 kg)   Weight Method Bed scale   Percent Weight Change -1.6   Post-Hemodialysis Assessment   Post-Treatment Procedures Blood returned;Catheter capped, clamped with Citrate x 2 ports   Machine Disinfection Process Acid/Vinegar Clean;Heat Disinfect; Exterior Machine Disinfection   Total Liters Processed (l/min) 60.3 l/min   Dialyzer Clearance Moderately streaked   Duration of Treatment (minutes) 210 minutes   NET Removed (ml) 2000 ml   Tolerated Treatment Fair   Patient Response to Treatment Patient's vitals remained stable throughout treatment, No medications for BP support were needed during treatment, Right Groin tunneled CVC was able to run at a 300ml/min blood flow rate, lines were reversed several times, patient was repositioned to try to acheive a higher BFR, both lumens draw and flush with ease. 2000 ml net fluid removal, HD CVC sodium citrate locked. Bilateral Breath Sounds Diminished   Edema Right upper extremity; Left upper extremity;Right lower extremity; Left lower extremity   RUE Edema +1   LUE Edema +1   RLE Edema +1   LLE Edema +1

## 2022-03-02 NOTE — PROGRESS NOTES
Physical Therapy  DATE: 3/2/2022    NAME: Mohsen Mccrary  MRN: [id number]   : 1960    Patient not seen this date for Physical Therapy due to:      [] Cancel by RN or physician due to:    [] Hemodialysis    [] Critical Lab Value Level     [] Blood transfusion in progress    [] Acute or unstable cardiovascular status   _MAP < 55 or more than >115  _HR < 40 or > 130    [] Acute or unstable pulmonary status   -FiO2 > 60%   _RR < 5 or >40    _O2 sats < 85%    [] Strict Bedrest    [] Off Unit for surgery or procedure    [] Off Unit for testing       [] Pending imaging to R/O fracture    [x] Refusal by Patient. Nurse Tech in room assisting pt to shower. Pt requested to shower first then have PT check back. PT will follow up time permitting. [] Other      [] PT being discontinued at this time. Patient independent. No further needs. [] PT being discontinued at this time as the patient has been transferred to hospice care. No further needs.       Libby Blnaco Student Physical Therapist Assistant

## 2022-03-02 NOTE — PROGRESS NOTES
[facility]  Office: [address], DO, Randell Gonzalezress, DO, Von Paris, DO, Jacob Late Blood, DO, Nina Garland MD, Fermin Vora MD, Clau Aguilar MD, Avinash Stallworth MD, Nya Lainez MD, Maribell Munroe MD, Marysol Dash MD, Ricky Jaramillo, DO, Zachery Carroll, DO, Estella Williamson MD,  Valeria Cifuentes, DO, Ajit Woodruff MD, Marcelle Matamoros MD, Richardson De Anda MD, Jeniffer Pham MD, Zenobia Jha MD, Tulio Morrell, CNP, Shanda Kline, CNP, Juju Taylor, CNP, Jitendra Cross, CNS, Alfredo Osgood, CNP, Reina Jimenez, CNP, Kathy Bassett, CNP, Mati Resendiz, CNP, Jeffery Atkins, CNP, LIZZIE TannerC, Cathleen Toscano, [facility], Jurgen Solis, [facility], Jean Pierre Krishnan, CNP, Libby Zhou, CNP, Tristin Arboleda, CNP, Dwayne So, CNP, Clarita Butler, [facility], Daquan Lao, [facility]    Progress Note    3/2/2022    10:23 AM    Name:   Aj Hamilton  MRN:     [id number]     Acct:      [de-identified]   Room:   [address] [place] [address] Day:  4  Admit Date:  2/26/2022  9:13 AM    PCP:   No primary care provider on file. Code Status:  Full Code    Subjective:     C/C:   Chief Complaint   Patient presents with    Fever     Interval History Status: improved. Patient sitting up in chair, resting comfortably denies any complaints of chest pain, shortness of breath, nausea vomiting, fevers or chills or acute complaints. Brief History:     Per prior chart documentation  \"Jeremy Giraldo is a 61 y.o. Non- / non  male who presents with Fever   and is admitted to the hospital for the management of Sepsis ([facility]      This 62 yom presents from dialysis center with fever to 103.  He was unaware he had a fever and his only symptoms have been fatigue (chronic), sob/looney, and 4 episodes of diarrhea yesterday.  He also noticed a little reddish drainage from HD cath in groin.  Denies c/s/s/cough/n/v/abd pain/cp/palpitations\"    Review of Systems:     Constitutional: negative for chills, fevers, sweats  Respiratory:  negative for cough, dyspnea on exertion, shortness of breath, wheezing  Cardiovascular:  negative for chest pain, chest pressure/discomfort, lower extremity edema, palpitations  Gastrointestinal:  negative for abdominal pain, constipation, diarrhea, nausea, vomiting  Neurological:  negative for dizziness, headache    Medications: Allergies: Allergies   Allergen Reactions    Spironolactone Itching and Other (See Comments)       Current Meds:   Scheduled Meds:    metoprolol succinate  50 mg Oral Daily    heparin (porcine)  5,000 Units SubCUTAneous 3 times per day    ceFAZolin  1,000 mg IntraVENous Daily    aspirin  81 mg Oral Daily    chrissie-pito  1 tablet Oral Daily    lanthanum  1,000 mg Oral TID WC    midodrine  10 mg Oral TID WC    sodium chloride flush  5-40 mL IntraVENous 2 times per day     Continuous Infusions:    sodium chloride       PRN Meds: sodium citrate, sodium citrate, perflutren lipid microspheres, ondansetron, sodium chloride flush, sodium chloride, acetaminophen **OR** acetaminophen    Data:     Past Medical History:   has a past medical history of A-V fistula ([facility] AICD (automatic cardioverter/defibrillator) present, Asthma, Atrial fibrillation ([facility] Blood transfusion reaction, CAD (coronary artery disease), CHF (congestive heart failure) ([facility] CHF (congestive heart failure) ([facility] COPD (chronic obstructive pulmonary disease) ([facility] CRF (chronic renal failure), Diabetes mellitus ([facility] Dialysis patient ([facility] GERD (gastroesophageal reflux disease), Hemodialysis patient ([facility] Hypertension, and Obesity. Social History:   reports that he has never smoked. He has never used smokeless tobacco. He reports that he does not drink alcohol and does not use drugs.      Family History:   Family History   Adopted: Yes       Vitals:  BP (!) 143/75   Pulse 76   Temp 98.1 °F (36.7 °C) (Oral)   Resp 16   Ht 6' 1\" (1.854 m)   Wt 276 lb 0.3 oz (125.2 kg)   SpO2 96%   BMI 36.42 kg/m²   Temp (24hrs), Av.1 °F (36.7 °C), Min:97.9 °F (36.6 °C), Max:98.1 °F (36.7 °C)    Recent Labs     22   POCGLU 75       I/O (24Hr): No intake or output data in the 24 hours ending 22 1023    Labs:  Hematology:  Recent Labs     22  0606 22  0521   WBC 6.7 6.0   RBC 2.83* 2.77*   HGB 8.6* 8.4*   HCT 26.8* 26.1*   MCV 94.7 94.2   MCH 30.4 30.3   MCHC 32.1 32.2   RDW 13.9 13.9   * 147   MPV 12.5 11.9     Chemistry:  Recent Labs     22  0606 22  05   * 135   K 4.8 4.3   CL 92* 95*   CO2 26 28   GLUCOSE 84 83   BUN 58* 36*   CREATININE 14.63* 10.71*   MG 2.7* 2.4   ANIONGAP 16 12   LABGLOM 3* 5*   GFRAA 4* 6*   CALCIUM 10.1 9.6   PHOS 3.3 2.9     Recent Labs     22   POCGLU 75     ABG:  Lab Results   Component Value Date    POCPH 7.43 2015    POCPCO2 42 2015    POCPO2 110 2015    POCHCO3 27.8 2015    NBEA NOT REPORTED 2015    PBEA 3 2015    AMA3ODW 29 2015    VTTE9VHT 98 2015    FIO2 30.0 2015     Lab Results   Component Value Date/Time    SPECIAL RAC 12ML 2022 10:00 AM     Lab Results   Component Value Date/Time    CULTURE NO GROWTH <24 HRS 2022 05:20 AM    CULTURE NO GROWTH <24 HRS 2022 05:20 AM       Radiology:  CT HEAD WO CONTRAST    Result Date: 2022  No acute intracranial abnormality. XR CHEST 1 VIEW    Result Date: 2022  No acute findings. IR TUNNELED CVC PLACE WO SQ PORT/PUMP > 5 YEARS    Result Date: 2022  Successful fluoroscopy guided tunneled catheter exchange, as above. CT CHEST ABDOMEN PELVIS WO CONTRAST    Result Date: 2022  1. No definite evidence for acute infective or inflammatory process. 2. A small focal area of pleural-parenchymal density at the right costophrenic angle in the lower lobe most suggestive of subsegmental atelectasis. Pneumonia considered much less likely.  3. Interval development of innumerable bilateral renal cysts with decrease in renal parenchymal tissue compatible with chronic renal parenchymal disease. 4. No abnormal fluid collections. 5. Right femoral vein central line terminating distally in the IVC. Physical Examination:        General appearance:  alert, cooperative and no distress  Mental Status:  oriented to person, place and time and normal affect  Lungs:  clear to auscultation bilaterally, normal effort  Heart:  regular rate and rhythm, no murmur  Abdomen:  soft, nontender, nondistended, normal bowel sounds, no masses, hepatomegaly, splenomegaly  Extremities:  no edema, redness, tenderness in the calves  Skin:  no gross lesions, rashes, induration    Assessment:        Hospital Problems           Last Modified POA    * (Principal) Sepsis due to methicillin susceptible Staphylococcus aureus (MSSA) without acute organ dysfunction ([address] 2/27/2022 Yes    Biventricular CHF (congestive heart failure) (HCC) with icd chronic  2/26/2022 Yes    Paroxysmal atrial fibrillation ([address] 2/26/2022 Yes    Overview Signed 2/3/2014  3:55 PM by Tyree Love V, DO     Chads 2 score. On ASA. Pt has refused coumadin therapy in the past.          Controlled type 2 diabetes mellitus with chronic kidney disease on chronic dialysis, without long-term current use of insulin ([address] 2/26/2022 Yes    ESRD (end stage renal disease) on dialysis ([address] 2/26/2022 Yes    Anemia of chronic disease 2/26/2022 Yes    CAD (coronary artery disease) 2/26/2022 Yes    Overview Signed 10/18/2020  8:28 AM by MD dr Wai Esquivel cardiologist recently aicd check /clearance 4/15               Plan:        1. Continue present antibiotics, cefazolin as ordered  2. Dialysis per nephrology  3. Continue home cardiac medications  4. Insulin scale  5. Await repeat cultures  6. Trend labs, correct electrolytes as needed  7. Cardiac telemetry  8. PT and OT  9. GI and DVT prophylaxis  10.  Discharge planning pending repeat cultures    Francoise Duncan DO  3/2/2022  10:23 AM

## 2022-03-02 NOTE — PLAN OF CARE
Problem: Falls - Risk of:  Goal: Will remain free from falls  Description: Will remain free from falls. Fall risk assessment completed. Patient instructed to use call light. Bed locked and in lowest position, side rails up 2/4, call light and bedside table within reach, clutter removed, and non-skid footwear on when pt out of bed. Hourly rounds will continue.    3/2/2022 1240 by Karissa Desai RN  Outcome: Ongoing     Problem: Infection, Septic Shock:  Goal: Will show no infection signs and symptoms  Description: Will show no infection signs and symptoms  3/2/2022 1240 by Karissa Desai RN  Outcome: Ongoing

## 2022-03-02 NOTE — PROGRESS NOTES
[facility]  Progress Note    Jn Madrigal  MRN: [id number]  Date: 3/2/2022  LOS: 4     Reason for F/U :   MSSA sepsis    Impression :   1. MSSA sepsis -concern is for a total catheter associated infection, endocarditis, AICD associated endocarditis. · Status post right groin tunneled hemodialysis catheter exchange 2/28/2022  · 2D echocardiogram without any evidence of endocarditis 2/28/2022  2. End-stage renal disease on hemodialysis  3. CAD with ischemic cardiomyopathy status post AICD  4. Atrial fibrillation on anticoagulation  5. Diabetes mellitus type 2  6. Obesity    Recommendations:   · Continue intravenous antimicrobial therapy with cefazolin. · Repeat blood data has been sent today. · Clinically the patient is doing well. · If the cultures remain negative on Friday the patient can be discharged on antimicrobial therapy with hemodialysis to complete a 4-week course of therapy. Infection Control Recommendations:   Universal precautions    Discharge Planning:   Estimated Length of IV antimicrobials: To be determined  Patient will need Midline Catheter Insertion/ PICC line Insertion: No  [facility],  [facility],  LTAC: Undetermined  Patient willneed outpatient wound care: No    Medical Decision making / Summary of Stay:   Jn Madrigal is a 62y.o.-year-old male who was initially admitted on 2/26/2022. Kanika Jang has a history of end-stage renal disease and he has been on hemodialysis for about 5 years now. He does reports that about 2 weeks ago he did have his right-sided femoral tunneled hemodialysis catheter changed because he was having some bleeding issues for close to 2 weeks at that catheter site. The patient was in his usual state of health until Friday he reports that he has some diarrhea about 4 loose bowel movements, headache and by Saturday morning he was feeling kind of tired when he went to hemodialysis he was noted to have a high fever.   He did not report any subjective fevers or chills. No cough or shortness of shortness of breath. No chest pains or palpitations. No abdominal pain. He did not have any bowel movements on Saturday despite having the diarrhea on Friday and he reports having two bowel movements today that were loose. The patient does have some vascular issues is followed by Dr. Viri Boyer at the [facility] and he tells me he was scheduled for some surgery this week.     The patient was admitted and started on antimicrobial therapy and blood cultures done in the emergency room have come back 2 out of 2 sets with Staph aureus. I was asked to evaluate and help with antibiotic choice. Current evaluation:3/2/2022    BP (!) 143/75   Pulse 76   Temp 98.1 °F (36.7 °C) (Oral)   Resp 16   Ht 6' 1\" (1.854 m)   Wt 276 lb 0.3 oz (125.2 kg)   SpO2 96%   BMI 36.42 kg/m²     Temperature Range: Temp: 98.1 °F (36.7 °C) Temp  Av °F (36.7 °C)  Min: 97.9 °F (36.6 °C)  Max: 98.1 °F (36.7 °C)  The patient is seen and evaluated at bedside and is awake and alert in no acute distress pain  No subjective fevers or chills. No abdominal pain nausea vomiting or diarrhea. No chest pains or palpitations. Review of Systems   Constitutional: Negative. Respiratory: Negative. Cardiovascular: Negative. Gastrointestinal: Negative. Genitourinary: Negative. Musculoskeletal: Negative. Allergic/Immunologic: Negative. Neurological: Negative. Physical Examination :     Physical Exam  Constitutional:       Appearance: He is well-developed. HENT:      Head: Normocephalic and atraumatic. Cardiovascular:      Rate and Rhythm: Normal rate. Heart sounds: Normal heart sounds. No friction rub. No gallop. Pulmonary:      Effort: Pulmonary effort is normal.      Breath sounds: Normal breath sounds. No wheezing. Abdominal:      General: Bowel sounds are normal.      Palpations: Abdomen is soft. There is no mass. Tenderness: There is no abdominal tenderness. Musculoskeletal:         General: Normal range of motion. Cervical back: Neck supple. Lymphadenopathy:      Cervical: No cervical adenopathy. Skin:     General: Skin is warm and dry. Neurological:      Mental Status: He is alert and oriented to person, place, and time. Laboratory data:   I have independently reviewed the followinglabs:  CBC with Differential:   Recent Labs     03/01/22  0606 03/02/22  0521   WBC 6.7 6.0   HGB 8.6* 8.4*   HCT 26.8* 26.1*   * 147   LYMPHOPCT 22* 30   MONOPCT 23* 17*     BMP:   Recent Labs     03/01/22  0606 03/02/22  0521   * 135   K 4.8 4.3   CL 92* 95*   CO2 26 28   BUN 58* 36*   CREATININE 14.63* 10.71*   MG 2.7* 2.4     Hepatic Function Panel:   No results for input(s): PROT, LABALBU, BILIDIR, IBILI, BILITOT, ALKPHOS, ALT, AST in the last 72 hours. Lab Results   Component Value Date    PROCAL >100.00 02/27/2022     Lab Results   Component Value Date    CRP 82.5 09/21/2019     Lab Results   Component Value Date    SEDRATE 46 (H) 08/21/2014         Lab Results   Component Value Date    DDIMER 4.96 05/20/2015    DDIMER 5.23 04/09/2015    DDIMER 1.14 08/22/2014    DDIMER 1.03 08/21/2014     Lab Results   Component Value Date    FERRITIN 265 01/30/2014     Lab Results   Component Value Date     02/26/2022     Lab Results   Component Value Date    FIBRINOGEN 477 02/26/2022    FIBRINOGEN 476 05/20/2015    FIBRINOGEN 697 04/09/2015       Results in Past 30 Days  Result Component Current Result Ref Range Previous Result Ref Range   SARS-CoV-2, Rapid Not Detected (2/26/2022) Not Detected Not in Time Range      Lab Results   Component Value Date    COVID19 Not Detected 02/26/2022    COVID19 Not Detected 10/17/2020       No results for input(s): [facility] in the last 72 hours. Imaging Studies:   TTE procedure: 2D Echocardiogram, M-Mode, Doppler, Color Doppler.   02/28/2022 10:23 AM    Summary  Mild to moderate left ventricular hypertrophy  Global left ventricular systolic function is mildly reduced  Estimated ejection fraction is 40-45 % . Evidence of diastolic dysfunction. Mild tricuspid regurgitation. Mild pulmonary hypertension, RVSP 43. Signature  Electronically signed by Romario Masterson) on 02/28/2022 02:22 PM  Electronically signed by Estevan Michel(Interpreting physician) on 02/28/2022 02:27 PM    Cultures:     Culture, Blood 1 [[id number]] Collected: 03/02/22 0520   Order Status: Sent Specimen: Blood Updated: 03/02/22 0521   Culture, Blood 1 [[id number]] Collected: 03/02/22 0520   Order Status: Sent Specimen: Blood Updated: 03/02/22 0521       Culture, Blood 1 [[id number]] (Abnormal) Collected: 02/26/22 1016   Order Status: Completed Specimen: Blood Updated: 02/28/22 0826    Specimen Description . BLOOD 10 ML  RIGHT HAND    Culture POSITIVE Blood Culture     DIRECT GRAM STAIN FROM BOTTLE: GRAM POSITIVE COCCI IN CLUSTERS     STAPHYLOCOCCUS AUREUS For susceptibility, refer to previous culture. Abnormal      (NOTE) Direct Gram Stain from bottle result called to and read back by: HODAN SANABRIA on 2/27/2022 at 00:45 AND TO HORTENCIA PORTER 2-27-22 AT 0640   Culture, Blood 1 [[id number]] (Abnormal)  Collected: 02/26/22 1000   Order Status: Completed Specimen: Blood Updated: 02/28/22 0825    Specimen Description . BLOOD    Special Requests RAC 12ML    Culture POSITIVE Blood Culture     DIRECT GRAM STAIN FROM BOTTLE: GRAM POSITIVE COCCI IN CLUSTERS     Staphylococcus aureus Detected: mecA/C and MREJ Not Detected Methodology- Polymerase Chain Reaction (PCR)     STAPHYLOCOCCUS AUREUS Abnormal      (NOTE) Direct Gram Stain from bottle and Polymerase Chain Reaction (PCR) results called to and read back by: HODAN SANABRIA on 2/27/2022 at 00:45 AND TO HORTENCIA PORTER 2-27-22 AT 2747   Susceptibility     Staphylococcus aureus     BACTERIAL SUSCEPTIBILITY PANEL GLENN     clindamycin <=0.25  Sensitive erythromycin <=0.25  Sensitive     gentamicin <=0.5  Sensitive 1     levofloxacin <=0.12  Sensitive     oxacillin <=0.25  Sensitive     penicillin 0.25  Resistant     tetracycline <=1  Sensitive     trimethoprim-sulfamethoxazole <=10  Sensitive        Medications:      metoprolol succinate  50 mg Oral Daily    heparin (porcine)  5,000 Units SubCUTAneous 3 times per day    ceFAZolin  1,000 mg IntraVENous Daily    aspirin  81 mg Oral Daily    chrissie-pito  1 tablet Oral Daily    lanthanum  1,000 mg Oral TID WC    midodrine  10 mg Oral TID WC    sodium chloride flush  5-40 mL IntraVENous 2 times per day           [facility]  Lynn Barrios MD  Perfect Serve messaging  OFFICE: [phone]      Electronically signed by Lynn Barrios MD on 3/2/2022 at 7:19 AM  Thank you for allowing us to participate in the care of this patient. Please call with questions. This note iscreated with the assistance of a speech recognition program.  While intending to generate a document that actually reflects the content of the visit, the document can still have some errors including those of syntax andsound a like substitutions which may escape proof reading. In such instances, actual meaning can be extrapolated by contextual diversion.

## 2022-03-02 NOTE — PROGRESS NOTES
Renal Progress Note    Patient :  Trung Ramos; 62 y.o. MRN# [id number]  Location:  3384/3135-26  Attending:  Uriel Mosley DO  Admit Date:  2/26/2022   Hospital Day: 4      Subjective:     Patient was seen and examined. No new issues reported overnight. Has positive MSSA sepsis, on IV antibiotics and infectious diseases on board. Status post tunneled catheter placement by IR 2/28/2022. Normally gets dialysis as per TTS schedule. Had regular dialysis yesterday ran for 210 minutes and got about 2 L removed. 2D echo 2/28/2022: EF 40 to 45%. Evidence of diastolic dysfunction. Mild tricuspid regurgitation. Mild pulmonary hypertension RVSP 43. Mild to moderate left ventricular hypertrophy. Outpatient Medications:     Medications Prior to Admission: gabapentin (NEURONTIN) 300 MG capsule, Take 300 mg by mouth daily as needed. Methoxy PEG-Epoetin Beta (MIRCERA) 30 MCG/0.3ML SOSY, Inject 30 mcg as directed every 14 days Patient takes this with dialysis every 2 weeks  Cholecalciferol (VITAMIN D3) 1.25 MG (87556 UT) CAPS, Take 1.25 capsules by mouth See Admin Instructions Patient takes this medication 3 times a week with dialysis  methocarbamol (ROBAXIN) 750 MG tablet, Take 750 mg by mouth 3 times daily as needed   lanthanum (FOSRENOL) 1000 MG chewable tablet, Take 1,000 mg by mouth 3 times daily (with meals)   metoprolol succinate (TOPROL XL) 50 MG extended release tablet, Take 50 mg by mouth daily   aspirin 81 MG chewable tablet, Take 81 mg by mouth daily  midodrine (PROAMATINE) 10 MG tablet, Take 10 mg by mouth 2 times daily as needed (at dialysis as needed)   cinacalcet (SENSIPAR) 60 MG tablet, 60 mg every Tuesday Thursday and Saturday with dialysis (Patient taking differently: Take 60 mg by mouth three times a week 60 mg every Tuesday Thursday and Saturday with dialysis)  [DISCONTINUED] ceFAZolin (ANCEF) 1 g injection, 3 gm with each dialysis session.  End date of 10/30/20    Current Medications: Scheduled Meds:    metoprolol succinate  50 mg Oral Daily    heparin (porcine)  5,000 Units SubCUTAneous 3 times per day    ceFAZolin  1,000 mg IntraVENous Daily    aspirin  81 mg Oral Daily    chrissie-pito  1 tablet Oral Daily    lanthanum  1,000 mg Oral TID WC    midodrine  10 mg Oral TID WC    sodium chloride flush  5-40 mL IntraVENous 2 times per day     Continuous Infusions:    sodium chloride       PRN Meds:  sodium citrate, sodium citrate, perflutren lipid microspheres, ondansetron, sodium chloride flush, sodium chloride, acetaminophen **OR** acetaminophen    Input/Output:       I/O last 3 completed shifts:  In: -   Out: 1 [Urine:1]. Patient Vitals for the past 96 hrs (Last 3 readings):   Weight   22 1613 276 lb 0.3 oz (125.2 kg)   22 1218 280 lb 8 oz (127.2 kg)   22 0409 134 lb 1 oz (60.8 kg)       Vital Signs:   Temperature:  Temp: 98.1 °F (36.7 °C)  TMax:   Temp (24hrs), Av.1 °F (36.7 °C), Min:97.9 °F (36.6 °C), Max:98.1 °F (36.7 °C)    Respirations:  Resp: 16  Pulse:   Pulse: 76  BP:    BP: (!) 143/75  BP Range: Systolic (93GZY), QWZ:463 , Min:113 , MZW:867       Diastolic (83JZB), UCQ:90, Min:48, Max:88      Physical Examination:     General:  AAO x 3, speaking in full sentences, no accessory muscle use. HEENT: Atraumatic, normocephalic, no throat congestion, moist mucosa. Eyes:   Pupils equal, round and reactive to light, EOMI. Neck:   Supple  Chest:   Bilateral vesicular breath sounds, no rales or wheezes. Cardiac:  S1 S2 RR, no murmurs, gallops or rubs. Abdomen: Soft, non-tender, no masses or organomegaly, BS audible. :   No suprapubic or flank tenderness. Neuro:  AAO x 3, No FND. SKIN:  No rashes, good skin turgor. Extremities:  +ve 1+ edema.     Labs:       Recent Labs     22  0606 22  0521   WBC 6.7 6.0   RBC 2.83* 2.77*   HGB 8.6* 8.4*   HCT 26.8* 26.1*   MCV 94.7 94.2   MCH 30.4 30.3   MCHC 32.1 32.2   RDW 13.9 13.9   * 147   MPV 12.5 11.9      BMP:   Recent Labs     03/01/22  0606 03/02/22  0521   * 135   K 4.8 4.3   CL 92* 95*   CO2 26 28   BUN 58* 36*   CREATININE 14.63* 10.71*   GLUCOSE 84 83   CALCIUM 10.1 9.6      Phosphorus:     Recent Labs     03/01/22  0606 03/02/22  0521   PHOS 3.3 2.9     Magnesium:    Recent Labs     03/01/22  0606 03/02/22  0521   MG 2.7* 2.4     Albumin:    No results for input(s): LABALBU in the last 72 hours. BNP:      Lab Results   Component Value Date    BNP NOT REPORTED 08/21/2014     SPEP:  Lab Results   Component Value Date    PROT 7.0 02/27/2022       Radiology:     Reviewed. Assessment:     1. ESRD on HD on TTS schedule at [facility] hemodialysis unit via tunnel catheter under Dr. Mili Abdalla.  2.  MSSA sepsis. 3. Anemia of chronic disease. 4. Secondary hyperparathyroidism. 5. Hypertension. 6. Coronary artery disease. 7. Ischemic cardiomyopathy status post AICD. 8. Diabetes type 2. Plan:   1. No acute need for dialysis today. Will get regular dialysis in a.m. as per TTS schedule. 2. Antibiotics as per infectious disease per ESRD dosing. 3. Okay to discharge from nephrology standpoint. Nutrition   Please ensure that patient is on a renal diet/TF. Avoid nephrotoxic drugs/contrast exposure. We will continue to follow along with you. Vineet Hermosillo MD  [facility]     This note is created with the assistance of a speech-recognition program. While intending to generate a document that actually reflects the content of the visit, no guarantees can be provided that every mistake has been identified and corrected by editing.

## 2022-03-02 NOTE — PROGRESS NOTES
Physical Therapy  Facility/Department: [facility] PROGRESSIVE CARE  Daily Treatment Note  NAME: Roseanne Herman  : 1960  MRN: [id number]    Date of Service: 3/2/2022    Discharge Recommendations:  Patient would benefit from continued therapy after discharge . Assessment   Body structures, Functions, Activity limitations: Decreased functional mobility ; Decreased ROM; Decreased strength;Decreased endurance;Decreased balance  Assessment: Ambulation is limited in pt room d/t pt request of staying in room so he does not catch anything from out in the hallway. Pt demo improved transfer ability, demo fair+ balance during transfers. Balance assessed this date, see below. Pt honesty with therapist in regards to only doing exercises with therapist lead and will not do them on his own or at home. Pt verbalized appreciation towards honesty and educated on importance of doing HEP on his own. Pt verbalized understanding. Pt demo improvements towards independence with transfers and ambulation this date. Pt demo good progression towards short term goals. Decision Making: High Complexity  REQUIRES PT FOLLOW UP: Yes  Activity Tolerance  Activity Tolerance: Patient limited by endurance; Patient limited by fatigue     Patient Diagnosis(es): The primary encounter diagnosis was Shortness of breath. Diagnoses of Prolonged Q-T interval on ECG and Septicemia [facility]) were also pertinent to this visit. has a past medical history of A-V fistula ([facility] [address] AICD (automatic cardioverter/defibrillator) present, Asthma, Atrial fibrillation ([address] Blood transfusion reaction, CAD (coronary artery disease), CHF (congestive heart failure) ([address] CHF (congestive heart failure) ([address] COPD (chronic obstructive pulmonary disease) ([address] CRF (chronic renal failure), Diabetes mellitus ([address] Dialysis patient ([address] GERD (gastroesophageal reflux disease), Hemodialysis patient ([address] Hypertension, and Obesity.    has a past surgical history that includes vascular surgery; Cardiac defibrillator placement (2006); pacemaker placement; and IR TUNNELED CVC PLACE WO SQ PORT/PUMP > 5 YEARS (2/28/2022). Restrictions  Restrictions/Precautions  Restrictions/Precautions: General Precautions,Fall Risk  Required Braces or Orthoses?: No  Subjective   General  Chart Reviewed: Yes  Response To Previous Treatment: Patient with no complaints from previous session. Family / Caregiver Present: No  Subjective  Subjective: \"Do I have to? Okay I will\"  General Comment  Comments: Okay per PT per RN  Pain Screening  Patient Currently in Pain: Denies  Pain Assessment  Pain Assessment: 0-10  Pain Level: 0  Patient's Stated Pain Goal: No pain  Vital Signs  Patient Currently in Pain: Denies  Orientation  Orientation  Overall Orientation Status: Within Normal Limits; Oriented x4  Objective   Bed Mobility:   Comment: Pt verbalizes independence/modified independence with bed mobility this date. Pt verbalizes use of bedrails for rolling only and elevates HOB to assist with sit<>supine and supine<>sit. Therapist provided education on importance of re-creating home like situation and patient stated bed at home has no rails but bed is able to raise and lower. Transfers  Sit to Stand: Supervision  Stand to sit: Supervision  Comment: Pt performed transfers with good safety awareness demo proper hand placement by pushing up from surface and reaching back for surface prior to sitting down. No verbal or tactile cues from therapist required. Ambulation  Ambulation?: Yes  Ambulation 1  Surface: level tile  Device: No Device  Assistance: Supervision;Stand by assistance  Gait Deviations: Slow Karyn;Decreased step length;Decreased step height  Distance: 100ft   Comments: Pt ambulated in room with Supervision. Therapist provided SBA during turns as patient has stated if he turns too fast, he gets dizzy and off balance.  Pt demo good pacing in room, safety with turning and verbalized when he was ready to sit down. Stairs/Curb  Stairs?: No  Balance  Posture: Good  Sitting - Static: Good  Sitting - Dynamic: Good  Standing - Static: Good;-  Standing - Dynamic: Fair;+  Exercises  Hip Flexion: 10x  Knee Long Arc Quad: 10x  Ankle Pumps: 10x   AM-PAC Score  AM-PAC Inpatient Mobility Raw Score : 23 (03/02/22 1400)  AM-PAC Inpatient T-Scale Score : 56.93 (03/02/22 1400)  Mobility Inpatient CMS 0-100% Score: 11.2 (03/02/22 1400)  Mobility Inpatient CMS G-Code Modifier : CI (03/02/22 1400)  Goals  Short term goals  Time Frame for Short term goals: 12 treatments  Short term goal 1: Independent ambulation 200' x 1  Short term goal 2: Independently ascend/descend 7 steps without HR  Short term goal 3: Good standing balance  Short term goal 4: Tolerate 30 min ther act  Patient Goals   Patient goals : Go home  Plan    Plan  Times per week: 1-2x/day,5-6 days/week  Current Treatment Recommendations: Transfer Training,Balance Training,Stair training,Gait Training,Endurance Training  Safety Devices  Type of devices: Gait belt,Left in chair,Call light within reach,Nurse notified,All fall risk precautions in place     Therapy Time   Individual Concurrent Group Co-treatment   Time In 1325         Time Out 3217         Minutes 23           Karis Zendejas reviewed the clinical documentation and is responsible for the care provided under the therapy plan of care.

## 2022-03-02 NOTE — PROGRESS NOTES
transfusion reaction, CAD (coronary artery disease), CHF (congestive heart failure) ([facility] [address] CHF (congestive heart failure) ([facility] [address] COPD (chronic obstructive pulmonary disease) ([facility] [address] CRF (chronic renal failure), Diabetes mellitus ([facility] [address] Dialysis patient ([facility] [address] GERD (gastroesophageal reflux disease), Hemodialysis patient ([facility] [address] Hypertension, and Obesity. has a past surgical history that includes vascular surgery; Cardiac defibrillator placement (2006); pacemaker placement; and IR TUNNELED CVC PLACE WO SQ PORT/PUMP > 5 YEARS (2/28/2022). Restrictions  Restrictions/Precautions  Restrictions/Precautions: General Precautions,Fall Risk  Required Braces or Orthoses?: No  Subjective   General  Patient assessed for rehabilitation services?: Yes  Family / Caregiver Present: No  Referring Practitioner: James Phan  Diagnosis: SOB  Subjective  Subjective: \"Just give me a minute to finish my ice cream\"  General Comment  Comments: Pt sitting up in recliner agreeable to OT treatment      Orientation  Orientation  Overall Orientation Status: Within Functional Limits  Objective    ADL  Additional Comments: Pt declined ADLs at this time. Pt reported he already showered this morning. Balance  Sitting Balance: Independent  Standing Balance: Stand by assistance  Standing Balance  Time: Approx 3 min  Activity: window <> door x3 retiring to recliner  Comment: Pt with high BP at end of session 188/71 . RN notified  Functional Mobility  Functional - Mobility Device: No device  Assist Level: Stand by assistance  Functional Mobility Comments: No LOBs at this time. Min vc's for pacing, slow/controlled movement, visual scanning, upright posture, pursed lip breathing, and overall safety. Bed mobility  Comment: N/T pt seated in recliner at start of session and retired to at end of session.   Transfers  Sit to stand: Stand by assistance  Stand to sit: Stand by assistance  Transfer Comments: Min vc's for slow/controlled sit/stands, squaring self and reaching back prior to sitting, pursed lip breathing, upright posture, and pacing for increased safety/decreased risk of falls. Type of ROM/Therapeutic Exercise  Type of ROM/Therapeutic Exercise: Resistive Bands  Comment: Pt participated to UB theraband exercises for increasing UB for increased IND with transfers/ADLs/daily tasks. Exercises  Scapular Protraction: x12  Scapular Retraction: x12  Shoulder Flexion: x12  Shoulder Extension: x12  Horizontal ABduction: x12  Horizontal ADduction: x12  Elbow Flexion: x12  Elbow Extension: x12  Other: PNF D2 x12                    Plan   Plan  Times per week: 3-5  Current Treatment Recommendations: Balance Training,Functional Mobility Training,Endurance Training,Safety Education & Training,Patient/Caregiver Education & Training,Equipment Evaluation, Education, & procurement,Self-Care / ADL                                                  AM-PAC Score        AM-[facility] Inpatient Daily Activity Raw Score: 19 (03/02/22 1213)  AM-PAC Inpatient ADL T-Scale Score : 40.22 (03/02/22 1213)  ADL Inpatient CMS 0-100% Score: 42.8 (03/02/22 1213)  ADL Inpatient CMS G-Code Modifier : CK (03/02/22 1213)    Goals  Short term goals  Time Frame for Short term goals: Within 10 sessions, pt will. ..   Short term goal 1: demo mod I and safety with transfers and functional mobility using DME PRN  Short term goal 2: demo mod I and safety with bathing, dressing, toileting, and grooming at   Short term goal 3: improve activity tolerance to engage in 20-30 minutes of dynamic seated/standing activity  Short term goal 4: demo G understanding and implementation of EC/WC, safety awareness, AE, and DME PRN during engagement in ADLs/IADLs  Short term goal 5: demo mod I and safety with IADLs with rest breaks PRN       Therapy Time   Individual Concurrent Group Co-treatment   Time In 1210         Time Out 1250         Minutes 40                 Rupert Imelda Law

## 2022-03-02 NOTE — PLAN OF CARE
Problem: Falls - Risk of:  Goal: Will remain free from falls  Description: Will remain free from falls  Outcome: Ongoing     Problem: Falls - Risk of:  Goal: Absence of physical injury  Description: Absence of physical injury  Outcome: Ongoing     Problem: Physical Regulation:  Goal: Complications related to the disease process, condition or treatment will be avoided or minimized  Description: Complications related to the disease process, condition or treatment will be avoided or minimized  Outcome: Ongoing     Problem: Infection, Septic Shock:  Goal: Will show no infection signs and symptoms  Description: Will show no infection signs and symptoms  Outcome: Ongoing     Problem: Pain:  Goal: Pain level will decrease  Description: Pain level will decrease  Outcome: Ongoing

## 2022-03-02 NOTE — CARE COORDINATION
Discharge planning    Chart reviewed. Patient under care of ID for MSSA and ID awaiting repeat cultures for iv atb determination. Patient goes to [facility] on [address] and will need to update once determination is made.      [facility] on [address]   Ph [phone]  Fax [phone]

## 2022-03-03 LAB
ABSOLUTE EOS #: 0.24 K/UL (ref 0–0.44)
ABSOLUTE IMMATURE GRANULOCYTE: 0.1 K/UL (ref 0–0.3)
ABSOLUTE LYMPH #: 2.42 K/UL (ref 1.1–3.7)
ABSOLUTE MONO #: 1 K/UL (ref 0.1–1.2)
ANION GAP SERPL CALCULATED.3IONS-SCNC: 13 MMOL/L (ref 9–17)
BASOPHILS # BLD: 0 % (ref 0–2)
BASOPHILS ABSOLUTE: <0.03 K/UL (ref 0–0.2)
BUN BLDV-MCNC: 49 MG/DL (ref 8–23)
BUN/CREAT BLD: 4 (ref 9–20)
CALCIUM SERPL-MCNC: 9.9 MG/DL (ref 8.6–10.4)
CHLORIDE BLD-SCNC: 96 MMOL/L (ref 98–107)
CO2: 27 MMOL/L (ref 20–31)
CREAT SERPL-MCNC: 13.14 MG/DL (ref 0.7–1.2)
EOSINOPHILS RELATIVE PERCENT: 3 % (ref 1–4)
GFR AFRICAN AMERICAN: 5 ML/MIN
GFR NON-AFRICAN AMERICAN: 4 ML/MIN
GFR SERPL CREATININE-BSD FRML MDRD: ABNORMAL ML/MIN/{1.73_M2}
GLUCOSE BLD-MCNC: 90 MG/DL (ref 70–99)
HCT VFR BLD CALC: 26.7 % (ref 40.7–50.3)
HEMOGLOBIN: 8.6 G/DL (ref 13–17)
IMMATURE GRANULOCYTES: 1 %
LYMPHOCYTES # BLD: 31 % (ref 24–43)
MAGNESIUM: 2.5 MG/DL (ref 1.6–2.6)
MCH RBC QN AUTO: 30.5 PG (ref 25.2–33.5)
MCHC RBC AUTO-ENTMCNC: 32.2 G/DL (ref 28.4–34.8)
MCV RBC AUTO: 94.7 FL (ref 82.6–102.9)
MONOCYTES # BLD: 13 % (ref 3–12)
NRBC AUTOMATED: 0 PER 100 WBC
PDW BLD-RTO: 13.7 % (ref 11.8–14.4)
PHOSPHORUS: 3.2 MG/DL (ref 2.5–4.5)
PLATELET # BLD: 152 K/UL (ref 138–453)
PMV BLD AUTO: 11.8 FL (ref 8.1–13.5)
POTASSIUM SERPL-SCNC: 4.5 MMOL/L (ref 3.7–5.3)
RBC # BLD: 2.82 M/UL (ref 4.21–5.77)
SEG NEUTROPHILS: 52 % (ref 36–65)
SEGMENTED NEUTROPHILS ABSOLUTE COUNT: 3.96 K/UL (ref 1.5–8.1)
SODIUM BLD-SCNC: 136 MMOL/L (ref 135–144)
WBC # BLD: 7.7 K/UL (ref 3.5–11.3)

## 2022-03-03 PROCEDURE — 2580000003 HC RX 258: Performed by: NURSE PRACTITIONER

## 2022-03-03 PROCEDURE — 6370000000 HC RX 637 (ALT 250 FOR IP): Performed by: INTERNAL MEDICINE

## 2022-03-03 PROCEDURE — 84100 ASSAY OF PHOSPHORUS: CPT

## 2022-03-03 PROCEDURE — 90935 HEMODIALYSIS ONE EVALUATION: CPT

## 2022-03-03 PROCEDURE — 80048 BASIC METABOLIC PNL TOTAL CA: CPT

## 2022-03-03 PROCEDURE — 36415 COLL VENOUS BLD VENIPUNCTURE: CPT

## 2022-03-03 PROCEDURE — 6360000002 HC RX W HCPCS: Performed by: INTERNAL MEDICINE

## 2022-03-03 PROCEDURE — 85025 COMPLETE CBC W/AUTO DIFF WBC: CPT

## 2022-03-03 PROCEDURE — 99232 SBSQ HOSP IP/OBS MODERATE 35: CPT | Performed by: INTERNAL MEDICINE

## 2022-03-03 PROCEDURE — 2500000003 HC RX 250 WO HCPCS: Performed by: INTERNAL MEDICINE

## 2022-03-03 PROCEDURE — 97110 THERAPEUTIC EXERCISES: CPT

## 2022-03-03 PROCEDURE — 97530 THERAPEUTIC ACTIVITIES: CPT

## 2022-03-03 PROCEDURE — 2060000000 HC ICU INTERMEDIATE R&B

## 2022-03-03 PROCEDURE — 97116 GAIT TRAINING THERAPY: CPT

## 2022-03-03 PROCEDURE — 6370000000 HC RX 637 (ALT 250 FOR IP): Performed by: NURSE PRACTITIONER

## 2022-03-03 PROCEDURE — 99232 SBSQ HOSP IP/OBS MODERATE 35: CPT | Performed by: NURSE PRACTITIONER

## 2022-03-03 PROCEDURE — 83735 ASSAY OF MAGNESIUM: CPT

## 2022-03-03 PROCEDURE — 2580000003 HC RX 258: Performed by: INTERNAL MEDICINE

## 2022-03-03 RX ADMIN — HEPARIN SODIUM 5000 UNITS: 5000 INJECTION INTRAVENOUS; SUBCUTANEOUS at 05:13

## 2022-03-03 RX ADMIN — HEPARIN SODIUM 5000 UNITS: 5000 INJECTION INTRAVENOUS; SUBCUTANEOUS at 14:37

## 2022-03-03 RX ADMIN — SODIUM CHLORIDE, PRESERVATIVE FREE 10 ML: 5 INJECTION INTRAVENOUS at 08:08

## 2022-03-03 RX ADMIN — HEPARIN SODIUM 5000 UNITS: 5000 INJECTION INTRAVENOUS; SUBCUTANEOUS at 20:55

## 2022-03-03 RX ADMIN — CEFAZOLIN SODIUM 1000 MG: 1 INJECTION, POWDER, FOR SOLUTION INTRAMUSCULAR; INTRAVENOUS at 18:20

## 2022-03-03 RX ADMIN — LANTHANUM CARBONATE 1000 MG: 500 TABLET, CHEWABLE ORAL at 12:20

## 2022-03-03 RX ADMIN — METOPROLOL SUCCINATE 50 MG: 50 TABLET, EXTENDED RELEASE ORAL at 08:07

## 2022-03-03 RX ADMIN — ASPIRIN 81 MG CHEWABLE TABLET 81 MG: 81 TABLET CHEWABLE at 08:07

## 2022-03-03 RX ADMIN — LANTHANUM CARBONATE 1000 MG: 500 TABLET, CHEWABLE ORAL at 18:19

## 2022-03-03 RX ADMIN — Medication 2.6 ML: at 17:59

## 2022-03-03 RX ADMIN — LANTHANUM CARBONATE 1000 MG: 500 TABLET, CHEWABLE ORAL at 08:07

## 2022-03-03 RX ADMIN — AMLODIPINE BESYLATE 5 MG: 5 TABLET ORAL at 08:07

## 2022-03-03 ASSESSMENT — PAIN SCALES - GENERAL: PAINLEVEL_OUTOF10: 0

## 2022-03-03 ASSESSMENT — ENCOUNTER SYMPTOMS
RESPIRATORY NEGATIVE: 1
GASTROINTESTINAL NEGATIVE: 1

## 2022-03-03 NOTE — PLAN OF CARE
Problem: Falls - Risk of:  Goal: Will remain free from falls  Description: Will remain free from falls  3/3/2022 0053 by Nuvia Saini RN  Outcome: Ongoing     Problem: Falls - Risk of:  Goal: Absence of physical injury  Description: Absence of physical injury  Outcome: Ongoing     Problem: Gas Exchange - Impaired:  Goal: Levels of oxygenation will improve  Description: Levels of oxygenation will improve  Outcome: Ongoing     Problem: Pain:  Goal: Control of acute pain  Description: Control of acute pain  Outcome: Ongoing     Problem: Pain:  Goal: Control of chronic pain  Description: Control of chronic pain  Outcome: Ongoing

## 2022-03-03 NOTE — PROGRESS NOTES
Treatment time: 210 minutes    Net UF: 1600 mL    Pre weight: 125.2 kg  Post weight: n/a  EDW: 127 kg    Access used: CVC right groin  Access function: good    Medications or blood products given: none    Regular outpatient schedule: TTS    Summary of response to treatment: Patient tolerated treatment well, goal reduced due to patient cramping, new dressing applied to catheter site, report given to Medardo Mills RN. Copy of dialysis treatment record placed in chart, to be scanned into EMR.

## 2022-03-03 NOTE — PLAN OF CARE
Problem: Falls - Risk of:  Goal: Will remain free from falls  Description: Will remain free from falls  3/3/2022 1007 by Basilia Barrett RN  Outcome: Ongoing  Note: Ongoing     Problem: Cardiac:  Goal: Ability to maintain vital signs within normal range will improve  Description: Ability to maintain vital signs within normal range will improve  Outcome: Ongoing  Note: Ongoing     Problem: Physical Regulation:  Goal: Complications related to the disease process, condition or treatment will be avoided or minimized  Description: Complications related to the disease process, condition or treatment will be avoided or minimized  Outcome: Ongoing  Note: Ongoing     Problem: Discharge Planning:  Goal: Discharged to appropriate level of care  Description: Discharged to appropriate level of care  Outcome: Ongoing  Note: Ongoing     Problem: Infection, Septic Shock:  Goal: Will show no infection signs and symptoms  Description: Will show no infection signs and symptoms  Outcome: Ongoing  Note: Ongoing     Problem: Pain:  Goal: Control of acute pain  Description: Control of acute pain  3/3/2022 1007 by Basilia Barrett RN  Outcome: Ongoing  Note: Ongoing

## 2022-03-03 NOTE — PROGRESS NOTES
[facility]  Progress Note    Keiry Griffith  MRN: [id number]  Date: 3/3/2022  LOS: 5     Reason for F/U :   MSSA sepsis-    Impression :   1. MSSA sepsis-concern is for a total catheter associated infection, endocarditis, AICD associated endocarditis  · Status post right groin tunneled hemodialysis catheter exchange 2/28/2022  · 2D echocardiogram without evidence of valvular vegetation  2. End-stage renal disease on hemodialysis  3. Coronary artery disease with ischemic cardiomyopathy, status post AICD placement  4. Atrial fibrillation on anticoagulation  5. Diabetes mellitus type 2  6. Obesity    Recommendations:   · Continue IV antimicrobial therapy with cefazolin  · Repeat blood cultures remain no growth to date  · As long as blood cultures remain negative on Friday, the plan will be to discharge patient on 4 weeks of IV antimicrobial therapy with hemodialysis to complete a 4-week course    Infection Control Recommendations:   Universal precautions    Discharge Planning:   Estimated Length of IV antimicrobials: To be determined  Patient will need Midline Catheter Insertion/ PICC line Insertion: No  Patient will need: Home IV , [facility],  [facility],  LTAC: Undetermined  Patient willneed outpatient wound care: No    Medical Decision making / Summary of Stay:   Lina Wood a 62y.o.-year-old male who was initially admitted on 2/26/2022.   Segun Mckeon has a history of end-stage renal disease and he has been on hemodialysis for about 5 years now. He does reports that about 2 weeks ago he did have his right-sided femoral tunneled hemodialysis catheter changed because he was having some bleeding issues for close to 2 weeks at that catheter site.   The patient was in his usual state of health until Friday he reports that he has some diarrhea about 4 loose bowel movements, headache and by Saturday morning he was feeling kind of tired when he went to hemodialysis he was noted to have a high fever.  He did not report any subjective fevers or chills.  No cough or shortness of shortness of breath.  No chest pains or palpitations.  No abdominal pain.  He did not have any bowel movements on Saturday despite having the diarrhea on Friday and he reports having two bowel movements today that were loose. The patient does have some vascular issues is followed by Dr. Jesica Mathews and he tells me he was scheduled for some surgery this week.     The patient was admitted and started on antimicrobial therapy and blood cultures done in the emergency room have come back 2 out of 2 sets with Staph aureus. I was asked to evaluate and help with antibiotic choice. Current evaluation:3/3/2022    BP (!) 151/80   Pulse 63   Temp 98.1 °F (36.7 °C) (Oral)   Resp 16   Ht 6' 1\" (1.854 m)   Wt 276 lb 0.3 oz (125.2 kg)   SpO2 98%   BMI 36.42 kg/m²     Temperature Range: Temp: 98.1 °F (36.7 °C) Temp  Av.8 °F (36.6 °C)  Min: 97.3 °F (36.3 °C)  Max: 98.1 °F (36.7 °C)    Patient was seen and evaluated sitting up in bed  States he feels well  Denies shortness of breath or cough  No chest pain    Review of Systems   Constitutional: Negative. HENT: Negative. Respiratory: Negative. Cardiovascular: Negative. Gastrointestinal: Negative. Genitourinary: Negative. Musculoskeletal: Negative. Skin: Negative. Neurological: Negative. Psychiatric/Behavioral: Negative. Physical Examination :     Physical Exam  Constitutional:       Appearance: Normal appearance. He is obese. HENT:      Head: Normocephalic and atraumatic. Cardiovascular:      Rate and Rhythm: Normal rate and regular rhythm. Pulmonary:      Effort: Pulmonary effort is normal. No respiratory distress. Breath sounds: Normal breath sounds. Abdominal:      General: Abdomen is flat. Bowel sounds are normal. There is no distension. Palpations: Abdomen is soft.    Musculoskeletal:         General: Normal range of motion. Skin:     General: Skin is warm and dry. Neurological:      General: No focal deficit present. Mental Status: He is alert and oriented to person, place, and time. Mental status is at baseline. Psychiatric:         Mood and Affect: Mood normal.         Behavior: Behavior normal.         Thought Content: Thought content normal.         Laboratory data:   I have independently reviewed the followinglabs:  CBC with Differential:   Recent Labs     03/02/22  0521 03/03/22 0520   WBC 6.0 7.7   HGB 8.4* 8.6*   HCT 26.1* 26.7*    152   LYMPHOPCT 30 31   MONOPCT 17* 13*     BMP:   Recent Labs     03/02/22  0521 03/03/22  0520    136   K 4.3 4.5   CL 95* 96*   CO2 28 27   BUN 36* 49*   CREATININE 10.71* 13.14*   MG 2.4 2.5     Hepatic Function Panel:   No results for input(s): PROT, LABALBU, BILIDIR, IBILI, BILITOT, ALKPHOS, ALT, AST in the last 72 hours. Lab Results   Component Value Date    PROCAL >100.00 02/27/2022     Lab Results   Component Value Date    CRP 82.5 09/21/2019     Lab Results   Component Value Date    SEDRATE 46 (H) 08/21/2014         Lab Results   Component Value Date    DDIMER 4.96 05/20/2015    DDIMER 5.23 04/09/2015    DDIMER 1.14 08/22/2014    DDIMER 1.03 08/21/2014     Lab Results   Component Value Date    FERRITIN 265 01/30/2014     Lab Results   Component Value Date     02/26/2022     Lab Results   Component Value Date    FIBRINOGEN 477 02/26/2022    FIBRINOGEN 476 05/20/2015    FIBRINOGEN 697 04/09/2015       Results in Past 30 Days  Result Component Current Result Ref Range Previous Result Ref Range   SARS-CoV-2, Rapid Not Detected (2/26/2022) Not Detected Not in Time Range      Lab Results   Component Value Date    COVID19 Not Detected 02/26/2022    COVID19 Not Detected 10/17/2020       No results for input(s): [facility] in the last 72 hours. Imaging Studies:   ECHO  FINDINGS  Left Atrium  Left atrium is normal in size.   Left Ventricle  Mild to moderate left ventricular hypertrophy  Global left ventricular systolic function is mildly reduced  Estimated ejection fraction is 40-45 % . Evidence of diastolic dysfunction. Right Atrium  Right atrium is normal in size. Pacing lead seen in the right atrium. Right Ventricle  Normal right ventricular size and function. Pacemaker / ICD lead seen in right ventricle. Mitral Valve  Normal mitral valve structure and function. Mild mitral regurgitation. Aortic Valve  Aortic valve is sclerotic but opens well. No aortic insufficiency. Tricuspid Valve  Mild tricuspid regurgitation. Mild pulmonary hypertension. Pulmonic Valve  The pulmonic valve is normal in structure. No pulmonic insufficiency. Pericardial Effusion  No pericardial effusion seen. Summary  Mild to moderate left ventricular hypertrophy  Global left ventricular systolic function is mildly reduced  Estimated ejection fraction is 40-45 % . Evidence of diastolic dysfunction. Mild tricuspid regurgitation. Mild pulmonary hypertension, RVSP 43.     Signature  ----------------------------------------------------------------------------   Electronically signed by Estevan Michel(Interpreting physician) on 02/28/2022   02:27 PM  Cultures:     Culture, Blood 1 [[id number]] Collected: 03/02/22 0520   Order Status: Completed Specimen: Blood Updated: 03/02/22 2021    Specimen Description . BLOOD    Culture NO GROWTH 12 HOURS   Culture, Blood 1 [[id number]] Collected: 03/02/22 0520   Order Status: Completed Specimen: Blood Updated: 03/02/22 2020    Specimen Description . BLOOD    Culture NO GROWTH 12 HOURS   Culture, Blood 1 [[id number]] (Abnormal)  Collected: 02/26/22 1000   Order Status: Completed Specimen: Blood Updated: 03/01/22 1758    Specimen Description . BLOOD    Special Requests RAC 12ML    Culture POSITIVE Blood Culture     DIRECT GRAM STAIN FROM BOTTLE: GRAM POSITIVE COCCI IN CLUSTERS     Staphylococcus aureus Detected: mecA/C and MREJ Not Detected Methodology- Polymerase Chain Reaction (PCR)     STAPHYLOCOCCUS AUREUS This isolate is methicillin susceptible. Abnormal      (NOTE) Direct Gram Stain from bottle and Polymerase Chain Reaction (PCR) results called to and read back by: HODAN Hogue on 2/27/2022 at 00:45 AND TO HORTENCIA PORTER 2-27-22 AT 0640   Culture, Blood 1 [[id number]] (Abnormal) Collected: 02/26/22 1016   Order Status: Completed Specimen: Blood Updated: 02/28/22 0826    Specimen Description . BLOOD 10 ML  RIGHT HAND    Culture POSITIVE Blood Culture     DIRECT GRAM STAIN FROM BOTTLE: GRAM POSITIVE COCCI IN CLUSTERS     STAPHYLOCOCCUS AUREUS For susceptibility, refer to previous culture. Abnormal      (NOTE) Direct Gram Stain from bottle result called to and read back by: HODAN SANABRIA on 2/27/2022 at 00:45 AND TO HORTENCIA PORTER 2-27-22 AT 9142         Medications:      amLODIPine  5 mg Oral Daily    metoprolol succinate  50 mg Oral Daily    heparin (porcine)  5,000 Units SubCUTAneous 3 times per day    ceFAZolin  1,000 mg IntraVENous Daily    aspirin  81 mg Oral Daily    chrissie-pito  1 tablet Oral Daily    lanthanum  1,000 mg Oral TID WC    midodrine  10 mg Oral TID WC    sodium chloride flush  5-40 mL IntraVENous 2 times per day           [facility]  Umer Holliday, [address] messaging  OFFICE: [phone]      Electronically signed by GYPSY Gomez CNP on 3/3/2022 at 7:54 AM  Thank you for allowing us to participate in the care of this patient. Please call with questions. This note iscreated with the assistance of a speech recognition program.  While intending to generate a document that actually reflects the content of the visit, the document can still have some errors including those of syntax andsound a like substitutions which may escape proof reading. In such instances, actual meaning can be extrapolated by contextual diversion.

## 2022-03-03 NOTE — PROGRESS NOTES
Occupational Therapy  Facility/Department: [facility] PROGRESSIVE CARE  Daily Treatment Note  NAME: Paulina Fernandez  : 1960  MRN: [id number]    Date of Service: 3/3/2022   RN [facility] reports patient is medically stable for therapy treatment this date. Chart reviewed prior to treatment and patient is agreeable for therapy. All lines intact and patient positioned comfortably at end of treatment. All patient needs addressed prior to ending therapy session. Discharge Recommendations:  Home with assist PRN       Assessment   Performance deficits / Impairments: Decreased functional mobility ; Decreased ADL status; Decreased safe awareness;Decreased endurance;Decreased balance;Decreased high-level IADLs  Assessment: Pt would benefit from continuation of skilled OT until d/c for increasing overall endurance and safety for safe return home. Prognosis: Good  OT Education: OT Role;Plan of Care;ADL Adaptive Strategies;Transfer Training;Energy Conservation;Home Exercise Program;Precautions  Patient Education: pursed lip breathing, postural control, benefits of AD use during mobility, benefits of performing tasks seated for EC/WS and fall prevention, home safety  REQUIRES OT FOLLOW UP: Yes  Activity Tolerance  Activity Tolerance: Patient Tolerated treatment well  Safety Devices  Safety Devices in place: Yes  Type of devices: Left in chair;Call light within reach;Nurse notified;Gait belt  Equipment Recommendations  Equipment Needed: Yes  Walker: Rolling  Shower Chair: X         Patient Diagnosis(es): The primary encounter diagnosis was Shortness of breath. Diagnoses of Prolonged Q-T interval on ECG and Septicemia [facility]) were also pertinent to this visit.       has a past medical history of A-V fistula ([facility] [address] AICD (automatic cardioverter/defibrillator) present, Asthma, Atrial fibrillation ([facility] [address] Blood transfusion reaction, CAD (coronary artery disease), CHF (congestive heart failure) ([facility] [address] CHF (congestive heart failure) ([facility] [address] COPD (chronic obstructive pulmonary disease) ([facility] [address] CRF (chronic renal failure), Diabetes mellitus ([facility] [address] Dialysis patient ([facility] [address] GERD (gastroesophageal reflux disease), Hemodialysis patient ([facility] [address] Hypertension, and Obesity. has a past surgical history that includes vascular surgery; Cardiac defibrillator placement (2006); pacemaker placement; and IR TUNNELED CVC PLACE WO SQ PORT/PUMP > 5 YEARS (2/28/2022). Restrictions  Restrictions/Precautions  Restrictions/Precautions: General Precautions,Fall Risk  Required Braces or Orthoses?: No  Subjective   General  Patient assessed for rehabilitation services?: Yes  Family / Caregiver Present: No  Referring Practitioner: Wilberto Watts  Diagnosis: SOB  Subjective  Subjective: \"Yeah I was planning on washing up in the bathroom\"  General Comment  Comments: Pt sitting in recliner agreeable to OT treatment. Orientation  Orientation  Overall Orientation Status: Within Functional Limits  Objective    ADL  Additional Comments: Writer encountered pt in morning attempting ADLs at 9:00 am. Pt verbalized well I wasn't planning on getting washed up until 9:15-9:30. Pt declined participation in anything else only wanting to lay in bed on his phone. Balance  Sitting Balance: Independent  Standing Balance: Stand by assistance  Standing Balance  Time: Approx 3 min  Activity: window <> door x3 retiring to recliner  Functional Mobility  Functional - Mobility Device: No device  Assist Level: Stand by assistance  Functional Mobility Comments: No LOBs at this time. Min vc's for pacing, slow/controlled movement, visual scanning, upright posture, pursed lip breathing, and overall safety. Bed mobility  Comment: N/T pt seated in recliner at start of session and retired to at end of session.   Transfers  Sit to stand: Stand by assistance  Stand to sit: Stand by assistance  Transfer Comments: Min vc's for slow/controlled sit/stands, squaring self and reaching back prior to sitting, pursed lip breathing, upright posture, and pacing for increased safety/decreased risk of falls. Pt performed 5 times sit to stand test and 30 second sit to stand test. Pt is 62years old falling into the 61-76 year old range. For 5 time sit to stand test pt was able to complete 4 STS within 11.4 seconds. For 30 second STS test pt was able to complete 8 in 30 seconds and normal range is 14+ fot pt's age range. Type of ROM/Therapeutic Exercise  Type of ROM/Therapeutic Exercise: Resistive Bands  Comment: Pt participated to UB theraband exercises for increasing UB for increased IND with transfers/ADLs/daily tasks. Exercises  Scapular Protraction: x10  Scapular Retraction: x10  Shoulder Flexion: x10  Shoulder Extension: x10  Horizontal ABduction: x10  Horizontal ADduction: x10  Elbow Flexion: x10  Elbow Extension: x10  Other: PNF D2 x10                    Plan   Plan  Times per week: 3-5  Current Treatment Recommendations: Balance Training,Functional Mobility Training,Endurance Training,Safety Education & Training,Patient/Caregiver Education & Training,Equipment Evaluation, Education, & procurement,Self-Care / ADL                                                  AM-PAC Score        -[facility] Daily Activity Raw Score: 20 (03/03/22 1256)  AM-PAC Inpatient ADL T-Scale Score : 42.03 (03/03/22 1256)  ADL Inpatient CMS 0-100% Score: 38.32 (03/03/22 1256)  ADL Inpatient CMS G-Code Modifier : CJ (03/03/22 1256)    Goals  Short term goals  Time Frame for Short term goals: Within 10 sessions, pt will. ..   Short term goal 1: demo mod I and safety with transfers and functional mobility using DME PRN  Short term goal 2: demo mod I and safety with bathing, dressing, toileting, and grooming at   Short term goal 3: improve activity tolerance to engage in 20-30 minutes of dynamic seated/standing activity  Short term goal 4: demo G understanding and implementation of EC/WC, safety awareness, AE, and DME PRN during engagement in ADLs/IADLs  Short term goal 5: demo mod I and safety with IADLs with rest breaks PRN       Therapy Time   Individual Concurrent Group Co-treatment   Time In 1250         Time Out 1329         Minutes Jon 142, CORNELL

## 2022-03-03 NOTE — PROGRESS NOTES
Renal Progress Note    Patient :  Nic Paz; 62 y.o. MRN# [id number]  Location:  [facility]/1359-67  Attending:  Francoise Duncan DO  Admit Date:  2/26/2022   Hospital Day: 5      Subjective:     Patient was seen and examined. No new issues reported overnight. Has positive MSSA sepsis, on IV antibiotics and infectious diseases on board. Ancef continues    Status post tunneled catheter exchange by IR 2/28/2022. Normally gets dialysis as per TTS schedule. Had regular dialysis yesterday ran for 210 minutes and got about 2 L removed. 2D echo 2/28/2022: EF 40 to 45%. Evidence of diastolic dysfunction. Mild tricuspid regurgitation. Mild pulmonary hypertension RVSP 43. Mild to moderate left ventricular hypertrophy. Patient hemodialysis today. Possible discharge tomorrow if cultures from yesterday are still negative  Outpatient Medications:     Medications Prior to Admission: gabapentin (NEURONTIN) 300 MG capsule, Take 300 mg by mouth daily as needed.    Methoxy PEG-Epoetin Beta (MIRCERA) 30 MCG/0.3ML SOSY, Inject 30 mcg as directed every 14 days Patient takes this with dialysis every 2 weeks  Cholecalciferol (VITAMIN D3) 1.25 MG (35573 UT) CAPS, Take 1.25 capsules by mouth See Admin Instructions Patient takes this medication 3 times a week with dialysis  methocarbamol (ROBAXIN) 750 MG tablet, Take 750 mg by mouth 3 times daily as needed   lanthanum (FOSRENOL) 1000 MG chewable tablet, Take 1,000 mg by mouth 3 times daily (with meals)   metoprolol succinate (TOPROL XL) 50 MG extended release tablet, Take 50 mg by mouth daily   aspirin 81 MG chewable tablet, Take 81 mg by mouth daily  midodrine (PROAMATINE) 10 MG tablet, Take 10 mg by mouth 2 times daily as needed (at dialysis as needed)   cinacalcet (SENSIPAR) 60 MG tablet, 60 mg every Tuesday Thursday and Saturday with dialysis (Patient taking differently: Take 60 mg by mouth three times a week 60 mg every Tuesday Thursday and Saturday with dialysis)  [DISCONTINUED] ceFAZolin (ANCEF) 1 g injection, 3 gm with each dialysis session. End date of 10/30/20    Current Medications:     Scheduled Meds:    amLODIPine  5 mg Oral Daily    metoprolol succinate  50 mg Oral Daily    heparin (porcine)  5,000 Units SubCUTAneous 3 times per day    ceFAZolin  1,000 mg IntraVENous Daily    aspirin  81 mg Oral Daily    chrissie-pito  1 tablet Oral Daily    lanthanum  1,000 mg Oral TID WC    midodrine  10 mg Oral TID WC    sodium chloride flush  5-40 mL IntraVENous 2 times per day     Continuous Infusions:    sodium chloride       PRN Meds:  sodium citrate, sodium citrate, perflutren lipid microspheres, ondansetron, sodium chloride flush, sodium chloride, acetaminophen **OR** acetaminophen    Input/Output:       I/O last 3 completed shifts: In: 200 [P.O.:200]  Out: - .      Patient Vitals for the past 96 hrs (Last 3 readings):   Weight   22 1613 276 lb 0.3 oz (125.2 kg)   22 1218 280 lb 8 oz (127.2 kg)   22 0409 134 lb 1 oz (60.8 kg)       Vital Signs:   Temperature:  Temp: 97.5 °F (36.4 °C)  TMax:   Temp (24hrs), Av.8 °F (36.6 °C), Min:97.3 °F (36.3 °C), Max:98.1 °F (36.7 °C)    Respirations:  Resp: 16  Pulse:   Pulse: 65  BP:    BP: (!) 156/85  BP Range: Systolic (15JXQ), PZN:682 , Min:135 , TVL:575       Diastolic (92ORP), ANY:95, Min:66, Max:85      Physical Examination:     General:  AAO x 3, speaking in full sentences, no accessory muscle use. HEENT: Atraumatic, normocephalic, no throat congestion, moist mucosa. Eyes:   Pupils equal, round and reactive to light, EOMI. Neck:   Supple  Chest:   Bilateral vesicular breath sounds, no rales or wheezes. Cardiac:  S1 S2 RR, no murmurs, gallops or rubs. Abdomen: Soft, non-tender, no masses or organomegaly, BS audible. :   No suprapubic or flank tenderness. Neuro:  AAO x 3, No FND. SKIN:  No rashes, good skin turgor. Extremities:  +ve 1+ edema.     Labs:       Recent Labs     22  0606 22  5324 03/03/22  0520   WBC 6.7 6.0 7.7   RBC 2.83* 2.77* 2.82*   HGB 8.6* 8.4* 8.6*   HCT 26.8* 26.1* 26.7*   MCV 94.7 94.2 94.7   MCH 30.4 30.3 30.5   MCHC 32.1 32.2 32.2   RDW 13.9 13.9 13.7   * 147 152   MPV 12.5 11.9 11.8      BMP:   Recent Labs     03/01/22  0606 03/02/22  0521 03/03/22  0520   * 135 136   K 4.8 4.3 4.5   CL 92* 95* 96*   CO2 26 28 27   BUN 58* 36* 49*   CREATININE 14.63* 10.71* 13.14*   GLUCOSE 84 83 90   CALCIUM 10.1 9.6 9.9      Phosphorus:     Recent Labs     03/01/22  0606 03/02/22  0521 03/03/22  0520   PHOS 3.3 2.9 3.2     Magnesium:    Recent Labs     03/01/22  0606 03/02/22  0521 03/03/22  0520   MG 2.7* 2.4 2.5     Albumin:    No results for input(s): LABALBU in the last 72 hours. BNP:      Lab Results   Component Value Date    BNP NOT REPORTED 08/21/2014     SPEP:  Lab Results   Component Value Date    PROT 7.0 02/27/2022       Radiology:     Reviewed. Assessment:     1. ESRD on HD on TTS schedule at [facility] hemodialysis unit via tunnel catheter under Dr. Praveena Saldivar.  2.  MSSA sepsis. 3. Anemia of chronic disease. 4. Secondary hyperparathyroidism. 5. Hypertension. 6. Coronary artery disease. 7. Ischemic cardiomyopathy status post AICD. 8. Diabetes type 2. Plan:   1. Hemodialysis today. Aim for 2 to 3 days of fluid removal  2. Ancef with dialysis for 4 weeks as per infectious disease per ESRD dosing. 3. Okay to discharge from nephrology standpoint. 4.  Possible discharge tomorrow  5. Fluid and potassium restriction in diet    Nutrition   Please ensure that patient is on a renal diet/TF. Avoid nephrotoxic drugs/contrast exposure. We will continue to follow along with you.

## 2022-03-03 NOTE — PROGRESS NOTES
HEMODIALYSIS PRE-TREATMENT NOTE    Patient Identifiers prior to treatment: Name,     Isolation Required: No                      Isolation Type: N/A       (please document if patient is being managed as a PUI/COVID-19 patient)        Hepatitis status:                           Date Drawn                             Result  Hepatitis B Surface Antigen 2/15/22     neg                     Hepatitis B Surface Antibody 21 neg        Hepatitis B Core Antibody 14 neg          How was Hepatitis Status verified: Outpatient labs     Was a copy of the labs you documented provided to facility for the patient's chart: Yes    Hemodialysis orders verified: Yes, with Dr. Danna Richardson Within normal limits ( I.e. s/s of infection,...): WNL, no signs or symptoms of infection noted     Pre-Assessment completed: Yes    Pre-dialysis report received from: Rupa Aguilar RN                      Time: 14:30

## 2022-03-03 NOTE — PROGRESS NOTES
Physical Therapy  Facility/Department: [facility] PROGRESSIVE CARE  Daily Treatment Note  NAME: Roseanne Herman  : 1960  MRN: [id number]    Date of Service: 3/3/2022    Discharge Recommendations:  Patient would benefit from continued therapy after discharge     Assessment   Body structures, Functions, Activity limitations: Decreased functional mobility ; Decreased strength;Decreased endurance;Decreased balance  Assessment: Pt demo good progression towards short term goals. Pt demo slight improvements in safety awareness, activty tolerance and balance, however still presents with deficits in strength, activity tolerance and balance. Pt actively participates throughout session, however session cut short d/t transport needing to take patient to HD. Specific instructions for Next Treatment: Therabands for LE  Prognosis: Good  Decision Making: High Complexity  REQUIRES PT FOLLOW UP: Yes  Activity Tolerance  Activity Tolerance: Patient Tolerated treatment well; Other (HD appt. )     Patient Diagnosis(es): The primary encounter diagnosis was Shortness of breath. Diagnoses of Prolonged Q-T interval on ECG and Septicemia [facility]) were also pertinent to this visit. has a past medical history of A-V fistula ([address] AICD (automatic cardioverter/defibrillator) present, Asthma, Atrial fibrillation ([address] Blood transfusion reaction, CAD (coronary artery disease), CHF (congestive heart failure) ([address] CHF (congestive heart failure) ([address] COPD (chronic obstructive pulmonary disease) ([address] CRF (chronic renal failure), Diabetes mellitus ([address] Dialysis patient ([address] GERD (gastroesophageal reflux disease), Hemodialysis patient ([address] Hypertension, and Obesity. has a past surgical history that includes vascular surgery; Cardiac defibrillator placement (); pacemaker placement; and IR TUNNELED CVC PLACE WO SQ PORT/PUMP > 5 YEARS (2022).     Restrictions  Restrictions/Precautions  Restrictions/Precautions: General Precautions,Fall Risk  Required Braces or Orthoses?: No  Subjective   General  Chart Reviewed: Yes  Response To Previous Treatment: Patient with no complaints from previous session. Family / Caregiver Present: No  Subjective  Subjective: Pt agreeable to therapy this date  General Comment  Comments: Okay per PT per RN  Pain Screening  Patient Currently in Pain: Denies  Pain Assessment  Pain Assessment: 0-10  Pain Level: 0  Patient's Stated Pain Goal: No pain  Vital Signs  Patient Currently in Pain: Denies  Oxygen Therapy  O2 Device: None (Room air)  Objective    Bed Mobility:   Sit to Supine: Modified Independent   Comment: Unable to assess full bed mobility d/t pt in recliner chair upon therapist arrival. Transport entered during session and therapist assessed pt sit<>supine. Pt demo mod. independence with sit<>supine with proper use of bed rails with no verbal or physical assist from therapist.    Transfers  Sit to Stand: Supervision  Stand to sit: Supervision  Comment: Pt safely demo transfers this date with supervision. Pt demo proper safety awareness during sit<>stand by scooting forward to allow feet in proper position underneat, BUE support to push up from arms of chair and good balance upon standing. When performing stand<>sit, pt demo proper safety awareness by backing up to chair completely, reaching back prior to sitting and controlled lower self into chair. Ambulation  Ambulation?: Yes  More Ambulation?: Yes  Ambulation 1  Surface: level tile  Device: No Device  Assistance: Supervision  Gait Deviations: Decreased step length;Decreased step height  Distance: ~150 ft  Comments: Pt ambulated within room with Supervision. Pt declines being out of room d/t increase chance of getting sick. Pt ambulated in room without LOB and able to perform slower, controlled turns without LOB. Pt able to maintain conversation during ambulation demo improved endurance.    Stairs/Curb  Stairs?: No  Balance  Posture: Good  Sitting - Static: Good  Sitting - Dynamic: Good  Standing - Static: Good  Standing - Dynamic: Fair;+  Comments: Pt demo no new deficits with balance this date. Exercises  Heelslides: 15x ea  Hip Flexion: 15x ea  Hip Abduction: 15x  Knee Long Arc Quad: 15x ea  Ankle Pumps: 15x  Comments: Pt demo good recall and carryover with seated exercises. Therapist provided minimal support for HEP. Pt demo good activity tolerance during seated exercises. AM-PAC Score  AM-PAC Inpatient Mobility Raw Score : 23 (03/03/22 1452)  AM-PAC Inpatient T-Scale Score : 56.93 (03/03/22 1452)  Mobility Inpatient CMS 0-100% Score: 11.2 (03/03/22 1452)  Mobility Inpatient CMS G-Code Modifier : CI (03/03/22 1452)  Goals  Short term goals  Time Frame for Short term goals: 12 treatments  Short term goal 1: Independent ambulation 200' x 1  Short term goal 2: Independently ascend/descend 7 steps without HR  Short term goal 3: Good standing balance  Short term goal 4: Tolerate 30 min ther act  Patient Goals   Patient goals : Go home  Plan    Plan  Times per week: 1-2x/day,5-6 days/week  Specific instructions for Next Treatment: Therabands for LE  Current Treatment Recommendations: Transfer Training,Balance Training,Stair training,Gait Training,Endurance Training  Safety Devices  Type of devices: Gait belt,Left in chair,Call light within reach,Nurse notified,All fall risk precautions in place (Transferred care to RNs to take for HD. )  Restraints  Initially in place: No     Therapy Time   Individual Concurrent Group Co-treatment   Time In [address]         Time Out [address] [facility]         Minutes 15           Karis Pearl reviewed the clinical documentation and is responsible for the care provided under the therapy plan of care.

## 2022-03-03 NOTE — CARE COORDINATION
Discharge planning     Chart reviewed. Patient under care of ID for MSSA and ID awaiting repeat cultures for iv atb determination. Patient goes to HD [address] and will need to update once determination is made.      [address]   Ph [phone]  Fax [phone]    ID noted yesterday:  Recommendations:   · Continue intravenous antimicrobial therapy with cefazolin. · Repeat blood data has been sent today. · Clinically the patient is doing well. · If the cultures remain negative on Friday the patient can be discharged on antimicrobial therapy with hemodialysis to complete a 4-week course of therapy.         Faxed over face sheet ID note and medication to [address]

## 2022-03-03 NOTE — PROGRESS NOTES
[facility]  Office: [address], DO, Cheyanne Barker, DO, Milli Maynard, DO, Sara Ovallege Blood, DO, Autumn Fajardo MD, Suhail Arias MD, Keya Ta MD, Rafael Goel MD, Trixie Oliva MD, Keely Foss MD, Tucker Hull MD, Natalie Phoenix, DO, Niru Hong DO, Ale Li MD,  Natacha Pal DO, Nunu Salas MD, Chitra Mcbride MD, Dannie Dykes MD, Lalita Vidal MD, Atiya Miller MD, Portillo Babb CNP, [facility] [place], [facility], Daisy Glass, [facility], Eligio Steen, CNS, Yessica Patterson, CNP, Mary Avila, CNP, Maria Guadalupe Morton, CNP, Carrie Schmitz, CNP, Marguerite Longoria CNP, Sharla Carlson PA-C, Artemio Primrose, [facility], Jay Bueno, [facility], Althea Schofield, CNP, Toña Tang CNP, Ashley Bazan, CNP, Layla Echols, [facility], Lazarus Mais, CNP, [facility]    Progress Note    3/3/2022    5:26 PM    Name:   Donal Cano  MRN:     [id number]     Acct:      [de-identified]   Room:   [address] [place] [address] Day:  5  Admit Date:  2/26/2022  9:13 AM    PCP:   No primary care provider on file. Code Status:  Full Code    Subjective:     C/C:   Chief Complaint   Patient presents with    Fever     Interval History Status: improved. Patient self in chair resting company and denies any complaints of chest pain, shortness of breath, nausea vomiting, fever chills or acute complaints. Brief History:     Per prior chart documentation  \"Jeremy Giraldo is a 61 y.o. Non- / non  male who presents with Fever   and is admitted to the hospital for the management of Sepsis ([facility]      This 62 yom presents from dialysis center with fever to 103.  He was unaware he had a fever and his only symptoms have been fatigue (chronic), sob/looney, and 4 episodes of diarrhea yesterday.  He also noticed a little reddish drainage from HD cath in groin.  Denies c/s/s/cough/n/v/abd pain/cp/palpitations\"    Review of Systems:     Constitutional:  negative for chills, fevers, sweats  Respiratory:  negative for cough, dyspnea on exertion, shortness of breath, wheezing  Cardiovascular:  negative for chest pain, chest pressure/discomfort, lower extremity edema, palpitations  Gastrointestinal:  negative for abdominal pain, constipation, diarrhea, nausea, vomiting  Neurological:  negative for dizziness, headache    Medications: Allergies: Allergies   Allergen Reactions    Spironolactone Itching and Other (See Comments)       Current Meds:   Scheduled Meds:    amLODIPine  5 mg Oral Daily    metoprolol succinate  50 mg Oral Daily    heparin (porcine)  5,000 Units SubCUTAneous 3 times per day    ceFAZolin  1,000 mg IntraVENous Daily    aspirin  81 mg Oral Daily    chrissie-pito  1 tablet Oral Daily    lanthanum  1,000 mg Oral TID WC    midodrine  10 mg Oral TID WC    sodium chloride flush  5-40 mL IntraVENous 2 times per day     Continuous Infusions:    sodium chloride       PRN Meds: sodium citrate, sodium citrate, perflutren lipid microspheres, ondansetron, sodium chloride flush, sodium chloride, acetaminophen **OR** acetaminophen    Data:     Past Medical History:   has a past medical history of A-V fistula ([facility]), AICD (automatic cardioverter/defibrillator) present, Asthma, Atrial fibrillation ([facility] Blood transfusion reaction, CAD (coronary artery disease), CHF (congestive heart failure) ([facility] CHF (congestive heart failure) ([facility] COPD (chronic obstructive pulmonary disease) ([facility] CRF (chronic renal failure), Diabetes mellitus ([facility] Dialysis patient ([facility] GERD (gastroesophageal reflux disease), Hemodialysis patient ([facility] Hypertension, and Obesity. Social History:   reports that he has never smoked. He has never used smokeless tobacco. He reports that he does not drink alcohol and does not use drugs.      Family History:   Family History   Adopted: Yes       Vitals:  /75   Pulse 62   Temp 97.7 °F (36.5 °C)   Resp 16   Ht 6' 1\" (1.854 m)   Wt 276 lb 0.3 oz (125.2 kg)   SpO2 100%   BMI 36.42 kg/m²   Temp (24hrs), Av.8 °F (36.6 °C), Min:97.3 °F (36.3 °C), Max:98.1 °F (36.7 °C)    No results for input(s): POCGLU in the last 72 hours. I/O (24Hr): No intake or output data in the 24 hours ending 22 1726    Labs:  Hematology:  Recent Labs     22  0606 22  0522  0520   WBC 6.7 6.0 7.7   RBC 2.83* 2.77* 2.82*   HGB 8.6* 8.4* 8.6*   HCT 26.8* 26.1* 26.7*   MCV 94.7 94.2 94.7   MCH 30.4 30.3 30.5   MCHC 32.1 32.2 32.2   RDW 13.9 13.9 13.7   * 147 152   MPV 12.5 11.9 11.8     Chemistry:  Recent Labs     22  0606 22  0522  0520   * 135 136   K 4.8 4.3 4.5   CL 92* 95* 96*   CO2 26 28 27   GLUCOSE 84 83 90   BUN 58* 36* 49*   CREATININE 14.63* 10.71* 13.14*   MG 2.7* 2.4 2.5   ANIONGAP 16 12 13   LABGLOM 3* 5* 4*   GFRAA 4* 6* 5*   CALCIUM 10.1 9.6 9.9   PHOS 3.3 2.9 3.2   No results for input(s): PROT, LABALBU, LABA1C, S5QKAHQ, Z7ISYYU, FT4, TSH, AST, ALT, LDH, GGT, ALKPHOS, LABGGT, BILITOT, BILIDIR, AMMONIA, AMYLASE, LIPASE, LACTATE, CHOL, HDL, LDLCHOLESTEROL, CHOLHDLRATIO, TRIG, VLDL, QAX19UG, PHENYTOIN, PHENYF, URICACID, POCGLU in the last 72 hours. ABG:  Lab Results   Component Value Date    POCPH 7.43 2015    POCPCO2 42 2015    POCPO2 110 2015    POCHCO3 27.8 2015    NBEA NOT REPORTED 2015    PBEA 3 2015    CJT9YBG 29 2015    YOQQ3YWH 98 2015    FIO2 30.0 2015     Lab Results   Component Value Date/Time    SPECIAL RAC 12ML 2022 10:00 AM     Lab Results   Component Value Date/Time    CULTURE NO GROWTH 1 DAY 2022 05:20 AM    CULTURE NO GROWTH 1 DAY 2022 05:20 AM       Radiology:  CT HEAD WO CONTRAST    Result Date: 2022  No acute intracranial abnormality. XR CHEST 1 VIEW    Result Date: 2022  No acute findings.      IR TUNNELED CVC PLACE WO SQ PORT/PUMP > 5 YEARS    Result Date: 2022  Successful fluoroscopy guided tunneled catheter exchange, as above. CT CHEST ABDOMEN PELVIS WO CONTRAST    Result Date: 2/26/2022  1. No definite evidence for acute infective or inflammatory process. 2. A small focal area of pleural-parenchymal density at the right costophrenic angle in the lower lobe most suggestive of subsegmental atelectasis. Pneumonia considered much less likely. 3. Interval development of innumerable bilateral renal cysts with decrease in renal parenchymal tissue compatible with chronic renal parenchymal disease. 4. No abnormal fluid collections. 5. Right femoral vein central line terminating distally in the IVC. Physical Examination:        General appearance:  alert, cooperative and no distress  Mental Status:  oriented to person, place and time and normal affect  Lungs:  clear to auscultation bilaterally, normal effort  Heart:  regular rate and rhythm, no murmur  Abdomen:  soft, nontender, nondistended, normal bowel sounds, no masses, hepatomegaly, splenomegaly  Extremities:  no edema, redness, tenderness in the calves  Skin:  no gross lesions, rashes, induration    Assessment:        Hospital Problems           Last Modified POA    * (Principal) Sepsis due to methicillin susceptible Staphylococcus aureus (MSSA) without acute organ dysfunction ([address] 2/27/2022 Yes    Biventricular CHF (congestive heart failure) (HCC) with icd chronic  2/26/2022 Yes    Paroxysmal atrial fibrillation ([address] 2/26/2022 Yes    Overview Signed 2/3/2014  3:55 PM by Ace LUKE DO     Chads 2 score. On ASA.  Pt has refused coumadin therapy in the past.          Controlled type 2 diabetes mellitus with chronic kidney disease on chronic dialysis, without long-term current use of insulin ([address] 2/26/2022 Yes    ESRD (end stage renal disease) on dialysis ([address] 2/26/2022 Yes    Anemia of chronic disease 2/26/2022 Yes    CAD (coronary artery disease) 2/26/2022 Yes    Overview Signed 10/18/2020  8:28 AM by Aiden Castillo MD dr Dougie Austin cardiologist recently aicd check /clearance 4/15               Plan:        1. Antibiotics as ordered  2. Await repeat cultures  3. HD per nephrology  4. Continue insulin scale for glycemic control  5. Cardiac medications as ordered  6. Trend labs, correct electrolytes as needed  7. PT and OT  8. GI DVT prophylaxis  9.  Discharge planning the next 24 hours pending repeat culture data    Kolton Burgess DO  3/3/2022  5:26 PM

## 2022-03-04 VITALS
BODY MASS INDEX: 32.11 KG/M2 | TEMPERATURE: 97.7 F | WEIGHT: 242.3 LBS | RESPIRATION RATE: 16 BRPM | DIASTOLIC BLOOD PRESSURE: 83 MMHG | HEIGHT: 73 IN | SYSTOLIC BLOOD PRESSURE: 140 MMHG | OXYGEN SATURATION: 99 % | HEART RATE: 64 BPM

## 2022-03-04 LAB
ABSOLUTE EOS #: 0.3 K/UL (ref 0–0.44)
ABSOLUTE IMMATURE GRANULOCYTE: 0.17 K/UL (ref 0–0.3)
ABSOLUTE LYMPH #: 2.59 K/UL (ref 1.1–3.7)
ABSOLUTE MONO #: 0.99 K/UL (ref 0.1–1.2)
ANION GAP SERPL CALCULATED.3IONS-SCNC: 13 MMOL/L (ref 9–17)
BASOPHILS # BLD: 0 % (ref 0–2)
BASOPHILS ABSOLUTE: 0.04 K/UL (ref 0–0.2)
BUN BLDV-MCNC: 31 MG/DL (ref 8–23)
BUN/CREAT BLD: 3 (ref 9–20)
CALCIUM SERPL-MCNC: 9.5 MG/DL (ref 8.6–10.4)
CHLORIDE BLD-SCNC: 96 MMOL/L (ref 98–107)
CO2: 26 MMOL/L (ref 20–31)
CREAT SERPL-MCNC: 10.06 MG/DL (ref 0.7–1.2)
EOSINOPHILS RELATIVE PERCENT: 3 % (ref 1–4)
GFR AFRICAN AMERICAN: 6 ML/MIN
GFR NON-AFRICAN AMERICAN: 5 ML/MIN
GFR SERPL CREATININE-BSD FRML MDRD: ABNORMAL ML/MIN/{1.73_M2}
GLUCOSE BLD-MCNC: 83 MG/DL (ref 70–99)
HCT VFR BLD CALC: 27.1 % (ref 40.7–50.3)
HEMOGLOBIN: 8.8 G/DL (ref 13–17)
IMMATURE GRANULOCYTES: 2 %
LYMPHOCYTES # BLD: 27 % (ref 24–43)
MAGNESIUM: 2.3 MG/DL (ref 1.6–2.6)
MCH RBC QN AUTO: 30.8 PG (ref 25.2–33.5)
MCHC RBC AUTO-ENTMCNC: 32.5 G/DL (ref 28.4–34.8)
MCV RBC AUTO: 94.8 FL (ref 82.6–102.9)
MONOCYTES # BLD: 11 % (ref 3–12)
NRBC AUTOMATED: 0 PER 100 WBC
PDW BLD-RTO: 14 % (ref 11.8–14.4)
PHOSPHORUS: 3.2 MG/DL (ref 2.5–4.5)
PLATELET # BLD: 165 K/UL (ref 138–453)
PMV BLD AUTO: 11.2 FL (ref 8.1–13.5)
POTASSIUM SERPL-SCNC: 4.3 MMOL/L (ref 3.7–5.3)
RBC # BLD: 2.86 M/UL (ref 4.21–5.77)
SEG NEUTROPHILS: 57 % (ref 36–65)
SEGMENTED NEUTROPHILS ABSOLUTE COUNT: 5.37 K/UL (ref 1.5–8.1)
SODIUM BLD-SCNC: 135 MMOL/L (ref 135–144)
WBC # BLD: 9.5 K/UL (ref 3.5–11.3)

## 2022-03-04 PROCEDURE — 6370000000 HC RX 637 (ALT 250 FOR IP): Performed by: NURSE PRACTITIONER

## 2022-03-04 PROCEDURE — 83735 ASSAY OF MAGNESIUM: CPT

## 2022-03-04 PROCEDURE — 6360000002 HC RX W HCPCS: Performed by: INTERNAL MEDICINE

## 2022-03-04 PROCEDURE — 6370000000 HC RX 637 (ALT 250 FOR IP): Performed by: INTERNAL MEDICINE

## 2022-03-04 PROCEDURE — 85025 COMPLETE CBC W/AUTO DIFF WBC: CPT

## 2022-03-04 PROCEDURE — 80048 BASIC METABOLIC PNL TOTAL CA: CPT

## 2022-03-04 PROCEDURE — 2580000003 HC RX 258: Performed by: INTERNAL MEDICINE

## 2022-03-04 PROCEDURE — 36415 COLL VENOUS BLD VENIPUNCTURE: CPT

## 2022-03-04 PROCEDURE — 99232 SBSQ HOSP IP/OBS MODERATE 35: CPT | Performed by: INTERNAL MEDICINE

## 2022-03-04 PROCEDURE — 2580000003 HC RX 258: Performed by: NURSE PRACTITIONER

## 2022-03-04 PROCEDURE — 84100 ASSAY OF PHOSPHORUS: CPT

## 2022-03-04 RX ORDER — FOLIC ACID/VIT B COMPLEX AND C 0.8 MG
TABLET ORAL
Qty: 30 TABLET | Refills: 4 | Status: SHIPPED | OUTPATIENT
Start: 2022-03-04

## 2022-03-04 RX ORDER — AMLODIPINE BESYLATE 5 MG/1
5 TABLET ORAL DAILY
Qty: 30 TABLET | Refills: 3 | Status: ON HOLD | OUTPATIENT
Start: 2022-03-05 | End: 2022-06-02 | Stop reason: HOSPADM

## 2022-03-04 RX ADMIN — CEFAZOLIN SODIUM 1000 MG: 1 INJECTION, POWDER, FOR SOLUTION INTRAMUSCULAR; INTRAVENOUS at 12:09

## 2022-03-04 RX ADMIN — ASPIRIN 81 MG CHEWABLE TABLET 81 MG: 81 TABLET CHEWABLE at 08:44

## 2022-03-04 RX ADMIN — AMLODIPINE BESYLATE 5 MG: 5 TABLET ORAL at 08:44

## 2022-03-04 RX ADMIN — LANTHANUM CARBONATE 1000 MG: 500 TABLET, CHEWABLE ORAL at 12:06

## 2022-03-04 RX ADMIN — SODIUM CHLORIDE, PRESERVATIVE FREE 10 ML: 5 INJECTION INTRAVENOUS at 09:13

## 2022-03-04 RX ADMIN — LANTHANUM CARBONATE 1000 MG: 500 TABLET, CHEWABLE ORAL at 08:44

## 2022-03-04 RX ADMIN — METOPROLOL SUCCINATE 50 MG: 50 TABLET, EXTENDED RELEASE ORAL at 08:44

## 2022-03-04 RX ADMIN — HEPARIN SODIUM 5000 UNITS: 5000 INJECTION INTRAVENOUS; SUBCUTANEOUS at 05:39

## 2022-03-04 ASSESSMENT — ENCOUNTER SYMPTOMS
GASTROINTESTINAL NEGATIVE: 1
RESPIRATORY NEGATIVE: 1
ALLERGIC/IMMUNOLOGIC NEGATIVE: 1

## 2022-03-04 NOTE — DISCHARGE SUMMARY
[facility]  Office: [address], DO, Kali Lind, DO, Ghada Joseph, DO, Aniket Ryan Blood, DO, Candida Pitts MD, Chantelle Cast MD, Shana Amador MD, Suma Stearns MD, Pio England MD, Tamika Douglass MD, Enoc Neves MD, Dheeraj Lee, DO, Karli Lai, DO, Cordelia Barthel, MD,  Gilbert Hull DO, Brina Harris MD, No Caballero MD, Shahnaz Lee MD, Chacho Maxwell MD, Nickolas Brown MD, Rosangela Chowdhury [facility], [facility], CNP, Tevin Kennedy, CNP, Chetna Rendon, CNS, Saima Johnson, CNP, Olu Garcia, CNP, Daysi Cano, CNP, Lily Vásquez, CNP, Mary Lee, CNP, Love Velasquez PA-C, Killian Harrell, [facility], Kerwin Laureano, [facility], Clarke Bennett, CNP, Jonn Boothe, CNP, Valery Bentley, CNP, Sallie Lozoya, CNP, Chuck Victoria, CNP, Gina Dawson, [facility]    Discharge Summary     Patient ID: Mahesh Willis  :  1960   MRN: [id number]     ACCOUNT:  [de-identified]   Patient's PCP: No primary care provider on file. Admit Date: 2022   Discharge Date: 3/4/2022     Length of Stay: 6  Code Status:  Full Code  Admitting Physician: Brian Prasad DO  Discharge Physician: Brian Praasd DO     Active Discharge Diagnoses:     Hospital Problem Lists:  Principal Problem:    Sepsis due to methicillin susceptible Staphylococcus aureus (MSSA) without acute organ dysfunction ([facility] [address] related to dialysis catheter  Active Problems:    Biventricular CHF (congestive heart failure) ([facility] with icd chronic     Paroxysmal atrial fibrillation ([facility]    Controlled type 2 diabetes mellitus with chronic kidney disease on chronic dialysis, without long-term current use of insulin (HCC)    ESRD (end stage renal disease) on dialysis ([facility]    Anemia of chronic disease    CAD (coronary artery disease)  Resolved Problems:    * No resolved hospital problems.  *      Admission Condition:  fair     Discharged Condition: stable    Hospital Stay: Hospital Course: Nic Paz is a 62 y.o. male who was admitted for the management of  Sepsis due to methicillin susceptible Staphylococcus aureus (MSSA) without acute organ dysfunction ([facility] , presented to ER with Fever    This is a 64-year-old male male with a complaint of fever and found to have MSSA sepsis most likely related to his dialysis catheter. He was treated with IV antibiotics, ultimately transitioned to IV Ancef based on sensitivities. On the 28th he underwent exchange of his dialysis catheter with repeat cultures. Repeat cultures have remained negative and at this point has been cleared by infectious disease for discharge home with course of Ancef to be completed with his dialysis treatments through the end of March. Significant therapeutic interventions: As above    Significant Diagnostic Studies:   Labs / Micro:  CBC:   Lab Results   Component Value Date    WBC 9.5 03/04/2022    RBC 2.86 03/04/2022    HGB 8.8 03/04/2022    HCT 27.1 03/04/2022    MCV 94.8 03/04/2022    MCH 30.8 03/04/2022    MCHC 32.5 03/04/2022    RDW 14.0 03/04/2022     03/04/2022     BMP:    Lab Results   Component Value Date    GLUCOSE 83 03/04/2022     03/04/2022    K 4.3 03/04/2022    CL 96 03/04/2022    CO2 26 03/04/2022    ANIONGAP 13 03/04/2022    BUN 31 03/04/2022    CREATININE 10.06 03/04/2022    BUNCRER 3 03/04/2022    CALCIUM 9.5 03/04/2022    LABGLOM 5 03/04/2022    GFRAA 6 03/04/2022    GFR      03/04/2022        Radiology:  CT HEAD WO CONTRAST    Result Date: 2/26/2022  No acute intracranial abnormality. XR CHEST 1 VIEW    Result Date: 2/26/2022  No acute findings. IR TUNNELED CVC PLACE WO SQ PORT/PUMP > 5 YEARS    Result Date: 2/28/2022  Successful fluoroscopy guided tunneled catheter exchange, as above. CT CHEST ABDOMEN PELVIS WO CONTRAST    Result Date: 2/26/2022  1. No definite evidence for acute infective or inflammatory process.  2. A small focal area of pleural-parenchymal density at the right costophrenic angle in the lower lobe most suggestive of subsegmental atelectasis. Pneumonia considered much less likely. 3. Interval development of innumerable bilateral renal cysts with decrease in renal parenchymal tissue compatible with chronic renal parenchymal disease. 4. No abnormal fluid collections. 5. Right femoral vein central line terminating distally in the IVC. Consultations:    Consults:     Final Specialist Recommendations/Findings:   IP CONSULT TO CARDIOLOGY  IP CONSULT TO HOSPITALIST  IP CONSULT TO NEPHROLOGY  IP CONSULT TO INFECTIOUS DISEASES      The patient was seen and examined on day of discharge and this discharge summary is in conjunction with any daily progress note from day of discharge.     Discharge plan:     Disposition: Home    Physician Follow Up:   Irene Reyez    In 1 week  follow up    MD DANIELE Stauffer [facility] [address], [address]  [phone]    In 3 weeks  follow up at dialysis    Chris Casas MD  [address] [facility] [address] [facility] [address]  [phone]    In 3 weeks  follow up       Requiring Further Evaluation/Follow Up POST HOSPITALIZATION/Incidental Findings: None    Diet: renal diet    Activity: As tolerated    Instructions to Patient: Take medications as prescribed    Discharge Medications:      Medication List      START taking these medications    amLODIPine 5 MG tablet  Commonly known as: NORVASC  Take 1 tablet by mouth daily  Start taking on: March 5, 2022     ceFAZolin  infusion  Commonly known as: ANCEF  Infuse 2,000 mg intravenously three times a week for 26 days Give with HD Compound per protocol  Start taking on: March 5, 2022        CHANGE how you take these medications    cinacalcet 60 MG tablet  Commonly known as: SENSIPAR  60 mg every Tuesday Thursday and Saturday with dialysis  What changed:   · how much to take  · how to take this  · when to take this        CONTINUE taking these medications aspirin 81 MG chewable tablet     gabapentin 300 MG capsule  Commonly known as: NEURONTIN     lanthanum 1000 MG chewable tablet  Commonly known as: FOSRENOL     methocarbamol 750 MG tablet  Commonly known as: ROBAXIN     metoprolol succinate 50 MG extended release tablet  Commonly known as: TOPROL XL     midodrine 10 MG tablet  Commonly known as: PROAMATINE     Mircera 30 MCG/0.3ML Sosy  Generic drug: Methoxy PEG-Epoetin Beta     chrissie-pito Tabs  TAKE 1 TABLET BY MOUTH EVERY EVENING     Vitamin D3 1.25 MG (52488 UT) Caps        STOP taking these medications    Eliquis 5 MG Tabs tablet  Generic drug: apixaban           Where to Get Your Medications      These medications were sent to [facility] [place] [address]    Phone: [phone]   · amLODIPine 5 MG tablet  · chrissie-pito Tabs     You can get these medications from any pharmacy    Bring a paper prescription for each of these medications  · ceFAZolin  infusion         No discharge procedures on file. Time Spent on discharge is  27 minutes in patient examination, evaluation, counseling as well as medication reconciliation, prescriptions for required medications, discharge plan and follow up. Electronically signed by   Francoise Duncan DO  3/4/2022  12:46 PM      Thank you Dr. Joanna Santiago primary care provider on file. for the opportunity to be involved in this patient's care.

## 2022-03-04 NOTE — PROGRESS NOTES
[facility]  Office: [address], DO, Rosa Rueda, DO, Pina Velasquez, DO, Kuldeep Aviles Blood, DO, Lay Dong MD, Ariadne Barry MD, Uzma Chery MD, Bryan Rivero MD, Jere Hein MD, Radha Bazan MD, Olga Lopez MD, Luis Murray, DO, Felicita Ma, DO, Cathryn Guthrie MD,  Luis Trevino, DO, Celeste Cosme MD, Oza Hatchet, MD, Norbert Evangelista MD, Niru Perez MD, Monica Arguelles MD, Zelda Schirmer, [facility], [facility] Regalado, CNP, Lauren Garcia, CNP, Bradley Diaz, CNS, Valencia Ruano, CNP, Jona Starr, CNP, Jakob Saavedra, CNP, Liang Tom, CNP, Krissy Hardin, CNP, Omid Anderson PA-C, Nichole Hernandez, [facility], Tammy Vidal, [facility], Donna Jackson, CNP, Billie Sanford, CNP, James Richardson, CNP, Mohit Linares, CNP, Donna Song, CNP, Carson Goltz, [facility]    Progress Note    3/4/2022    12:42 PM    Name:   Jn Madrigal  MRN:     [id number]     Acct:      [de-identified]   Room:   [address] [place] [address] Day:  6  Admit Date:  2/26/2022  9:13 AM    PCP:   No primary care provider on file. Code Status:  Full Code    Subjective:     C/C:   Chief Complaint   Patient presents with    Fever     Interval History Status: improved. Patient still in a chair resting company, denies any complaints of chest pain, shortness of breath, nausea vomiting, fevers or chills or acute complaints. Brief History:     Per prior chart documentation  \"Jeremy Giraldo is a 61 y.o. Non- / non  male who presents with Fever   and is admitted to the hospital for the management of Sepsis ([facility]      This 62 yom presents from dialysis center with fever to 103.  He was unaware he had a fever and his only symptoms have been fatigue (chronic), sob/looney, and 4 episodes of diarrhea yesterday.  He also noticed a little reddish drainage from HD cath in groin.  Denies c/s/s/cough/n/v/abd pain/cp/palpitations\"    Review of Systems:     Constitutional: negative for chills, fevers, sweats  Respiratory:  negative for cough, dyspnea on exertion, shortness of breath, wheezing  Cardiovascular:  negative for chest pain, chest pressure/discomfort, lower extremity edema, palpitations  Gastrointestinal:  negative for abdominal pain, constipation, diarrhea, nausea, vomiting  Neurological:  negative for dizziness, headache    Medications: Allergies: Allergies   Allergen Reactions    Spironolactone Itching and Other (See Comments)       Current Meds:   Scheduled Meds:    amLODIPine  5 mg Oral Daily    metoprolol succinate  50 mg Oral Daily    heparin (porcine)  5,000 Units SubCUTAneous 3 times per day    ceFAZolin  1,000 mg IntraVENous Daily    aspirin  81 mg Oral Daily    chrissie-pito  1 tablet Oral Daily    lanthanum  1,000 mg Oral TID WC    midodrine  10 mg Oral TID WC    sodium chloride flush  5-40 mL IntraVENous 2 times per day     Continuous Infusions:    sodium chloride       PRN Meds: sodium citrate, sodium citrate, perflutren lipid microspheres, ondansetron, sodium chloride flush, sodium chloride, acetaminophen **OR** acetaminophen    Data:     Past Medical History:   has a past medical history of A-V fistula ([facility]), AICD (automatic cardioverter/defibrillator) present, Asthma, Atrial fibrillation ([facility] Blood transfusion reaction, CAD (coronary artery disease), CHF (congestive heart failure) ([facility] CHF (congestive heart failure) ([facility] COPD (chronic obstructive pulmonary disease) ([facility] CRF (chronic renal failure), Diabetes mellitus ([facility] Dialysis patient ([facility] GERD (gastroesophageal reflux disease), Hemodialysis patient ([facility] Hypertension, and Obesity. Social History:   reports that he has never smoked. He has never used smokeless tobacco. He reports that he does not drink alcohol and does not use drugs.      Family History:   Family History   Adopted: Yes       Vitals:  BP (!) 140/83   Pulse 64   Temp 97.7 °F (36.5 °C) (Oral)   Resp 16   Ht 6' 1\" (1.854 m)   Wt 242 lb 4.8 oz (109.9 kg)   SpO2 99%   BMI 31.97 kg/m²   Temp (24hrs), Av.9 °F (36.6 °C), Min:97.7 °F (36.5 °C), Max:98.1 °F (36.7 °C)    No results for input(s): POCGLU in the last 72 hours. I/O (24Hr): No intake or output data in the 24 hours ending 22 1242    Labs:  Hematology:  Recent Labs     22  0629   WBC 6.0 7.7 9.5   RBC 2.77* 2.82* 2.86*   HGB 8.4* 8.6* 8.8*   HCT 26.1* 26.7* 27.1*   MCV 94.2 94.7 94.8   MCH 30.3 30.5 30.8   MCHC 32.2 32.2 32.5   RDW 13.9 13.7 14.0    152 165   MPV 11.9 11.8 11.2     Chemistry:  Recent Labs     22  0522  0629    136 135   K 4.3 4.5 4.3   CL 95* 96* 96*   CO2 28 27 26   GLUCOSE 83 90 83   BUN 36* 49* 31*   CREATININE 10.71* 13.14* 10.06*   MG 2.4 2.5 2.3   ANIONGAP 12 13 13   LABGLOM 5* 4* 5*   GFRAA 6* 5* 6*   CALCIUM 9.6 9.9 9.5   PHOS 2.9 3.2 3.2   No results for input(s): PROT, LABALBU, LABA1C, A4VQXFC, H2DMYEE, FT4, TSH, AST, ALT, LDH, GGT, ALKPHOS, LABGGT, BILITOT, BILIDIR, AMMONIA, AMYLASE, LIPASE, LACTATE, CHOL, HDL, LDLCHOLESTEROL, CHOLHDLRATIO, TRIG, VLDL, TQQ24VK, PHENYTOIN, PHENYF, URICACID, POCGLU in the last 72 hours. ABG:  Lab Results   Component Value Date    POCPH 7.43 2015    POCPCO2 42 2015    POCPO2 110 2015    POCHCO3 27.8 2015    NBEA NOT REPORTED 2015    PBEA 3 2015    RWT8GLP 29 2015    BYPO5YHI 98 2015    FIO2 30.0 2015     Lab Results   Component Value Date/Time    SPECIAL RAC 12ML 2022 10:00 AM     Lab Results   Component Value Date/Time    CULTURE NO GROWTH 2 DAYS 2022 05:20 AM    CULTURE NO GROWTH 2 DAYS 2022 05:20 AM       Radiology:  CT HEAD WO CONTRAST    Result Date: 2022  No acute intracranial abnormality. XR CHEST 1 VIEW    Result Date: 2022  No acute findings.      IR TUNNELED CVC PLACE WO SQ PORT/PUMP > 5 YEARS    Result Date: 2/28/2022  Successful fluoroscopy guided tunneled catheter exchange, as above. CT CHEST ABDOMEN PELVIS WO CONTRAST    Result Date: 2/26/2022  1. No definite evidence for acute infective or inflammatory process. 2. A small focal area of pleural-parenchymal density at the right costophrenic angle in the lower lobe most suggestive of subsegmental atelectasis. Pneumonia considered much less likely. 3. Interval development of innumerable bilateral renal cysts with decrease in renal parenchymal tissue compatible with chronic renal parenchymal disease. 4. No abnormal fluid collections. 5. Right femoral vein central line terminating distally in the IVC. Physical Examination:        General appearance:  alert, cooperative and no distress  Mental Status:  oriented to person, place and time and normal affect  Lungs:  clear to auscultation bilaterally, normal effort  Heart:  regular rate and rhythm, no murmur  Abdomen:  soft, nontender, nondistended, normal bowel sounds, no masses, hepatomegaly, splenomegaly  Extremities:  no edema, redness, tenderness in the calves  Skin:  no gross lesions, rashes, induration    Assessment:        Hospital Problems           Last Modified POA    * (Principal) Sepsis due to methicillin susceptible Staphylococcus aureus (MSSA) without acute organ dysfunction ([address] related to dialysis catheter 3/4/2022 Yes    Biventricular CHF (congestive heart failure) ([address] with icd chronic  2/26/2022 Yes    Paroxysmal atrial fibrillation ([address] 2/26/2022 Yes    Overview Signed 2/3/2014  3:55 PM by Devang Castorena V, DO     Chads 2 score. On ASA.  Pt has refused coumadin therapy in the past.          Controlled type 2 diabetes mellitus with chronic kidney disease on chronic dialysis, without long-term current use of insulin ([address] 2/26/2022 Yes    ESRD (end stage renal disease) on dialysis ([address] 2/26/2022 Yes    Anemia of chronic disease 2/26/2022 Yes    CAD (coronary artery disease) 2/26/2022 Yes Overview Signed 10/18/2020  8:28 AM by MD dr Anabella Lantigua cardiologist recently aicd check /clearance 4/15               Plan:        1. Continue IV Ancef, dosed can be given with dialysis versus outpatient  2. Continue present cardiac medications  3. Dialysis per nephrology  4. Insulin scale  5. GI DVT prophylaxis  6.  Discharge planning, home with IV antibiotics for 4 weeks with dialysis treatments    Uriel Mosley DO  3/4/2022  12:42 PM

## 2022-03-04 NOTE — CARE COORDINATION
Discharge Planning    Script for Ancef 2000mg IV three times a week for 26 days given with dialysis faxed to [facility] on [address] at fax [phone] and follow up phone call confirmed they received it. Updated patient on the antibiotic therapy. Pt declines need for home care or outpt therapy.

## 2022-03-04 NOTE — PROGRESS NOTES
[facility]  Progress Note    Shirin Hirsch  MRN: [id number]  Date: 3/4/2022  LOS: 6     Reason for F/U :   MSSA sepsis    Impression :   1. MSSA sepsis -concern is for a total catheter associated infection, endocarditis, AICD associated endocarditis. · Status post right groin tunneled hemodialysis catheter exchange 2/28/2022  · 2D echocardiogram without any evidence of endocarditis 2/28/2022  2. End-stage renal disease on hemodialysis  3. CAD with ischemic cardiomyopathy status post AICD  4. Atrial fibrillation on anticoagulation  5. Diabetes mellitus type 2  6. Obesity    Recommendations:   · Continue intravenous antimicrobial therapy with cefazolin to complete a 28-day course of therapy on 3/31/2022. · The blood culture data remains negative thus far. · Clinically the patient is doing well. · From an infectious disease standpoint the patient is okay for discharge he will need a dose of cefazolin today and then starting tomorrow he will get cefazolin 2 g with each hemodialysis treatment through 3/31/2022    Infection Control Recommendations:   Universal precautions    Discharge Planning:   Estimated Length of IV antimicrobials: 3/31/2022  Patient will need Midline Catheter Insertion/ PICC line Insertion: No  Patient willneed outpatient wound care: No    Medical Decision making / Summary of Stay:   Shirin Hirsch is a 62y.o.-year-old male who was initially admitted on 2/26/2022. [place] has a history of end-stage renal disease and he has been on hemodialysis for about 5 years now. He does reports that about 2 weeks ago he did have his right-sided femoral tunneled hemodialysis catheter changed because he was having some bleeding issues for close to 2 weeks at that catheter site.   The patient was in his usual state of health until Friday he reports that he has some diarrhea about 4 loose bowel movements, headache and by Saturday morning he was feeling kind of tired when he went to hemodialysis he was noted to have a high fever. He did not report any subjective fevers or chills. No cough or shortness of shortness of breath. No chest pains or palpitations. No abdominal pain. He did not have any bowel movements on Saturday despite having the diarrhea on Friday and he reports having two bowel movements today that were loose. The patient does have some vascular issues is followed by Dr. Kaveh Schulte at the [facility] and he tells me he was scheduled for some surgery this week.     The patient was admitted and started on antimicrobial therapy and blood cultures done in the emergency room have come back 2 out of 2 sets with Staph aureus. I was asked to evaluate and help with antibiotic choice. Current evaluation:3/4/2022    /80   Pulse 76   Temp 98.1 °F (36.7 °C) (Oral)   Resp 18   Ht 6' 1\" (1.854 m)   Wt 242 lb 4.8 oz (109.9 kg)   SpO2 98%   BMI 31.97 kg/m²     Temperature Range: Temp: 98.1 °F (36.7 °C) Temp  Av.9 °F (36.6 °C)  Min: 97.5 °F (36.4 °C)  Max: 98.1 °F (36.7 °C)  The patient is seen and evaluated at bedside and is awake and alert in no acute distress. No subjective fevers or chills. No cough or shortness of breath. No abdominal pain nausea vomiting or diarrhea    Review of Systems   Constitutional: Negative. Respiratory: Negative. Cardiovascular: Negative. Gastrointestinal: Negative. Genitourinary: Negative. Musculoskeletal: Negative. Allergic/Immunologic: Negative. Neurological: Negative. Physical Examination :     Physical Exam  Constitutional:       Appearance: He is well-developed. HENT:      Head: Normocephalic and atraumatic. Cardiovascular:      Rate and Rhythm: Normal rate. Heart sounds: Normal heart sounds. No friction rub. No gallop. Pulmonary:      Effort: Pulmonary effort is normal.      Breath sounds: Normal breath sounds. No wheezing.    Abdominal:      General: Bowel sounds are normal.      Palpations: Abdomen is soft. There is no mass. Tenderness: There is no abdominal tenderness. Musculoskeletal:         General: Normal range of motion. Cervical back: Neck supple. Lymphadenopathy:      Cervical: No cervical adenopathy. Skin:     General: Skin is warm and dry. Neurological:      Mental Status: He is alert and oriented to person, place, and time. Laboratory data:   I have independently reviewed the followinglabs:  CBC with Differential:   Recent Labs     03/03/22  0520 03/04/22  0629   WBC 7.7 9.5   HGB 8.6* 8.8*   HCT 26.7* 27.1*    165   LYMPHOPCT 31 27   MONOPCT 13* 11     BMP:   Recent Labs     03/03/22  0520 03/04/22  0629    135   K 4.5 4.3   CL 96* 96*   CO2 27 26   BUN 49* 31*   CREATININE 13.14* 10.06*   MG 2.5 2.3     Hepatic Function Panel:   No results for input(s): PROT, LABALBU, BILIDIR, IBILI, BILITOT, ALKPHOS, ALT, AST in the last 72 hours. Lab Results   Component Value Date    PROCAL >100.00 02/27/2022     Lab Results   Component Value Date    CRP 82.5 09/21/2019     Lab Results   Component Value Date    SEDRATE 46 (H) 08/21/2014         Lab Results   Component Value Date    DDIMER 4.96 05/20/2015    DDIMER 5.23 04/09/2015    DDIMER 1.14 08/22/2014    DDIMER 1.03 08/21/2014     Lab Results   Component Value Date    FERRITIN 265 01/30/2014     Lab Results   Component Value Date     02/26/2022     Lab Results   Component Value Date    FIBRINOGEN 477 02/26/2022    FIBRINOGEN 476 05/20/2015    FIBRINOGEN 697 04/09/2015       Results in Past 30 Days  Result Component Current Result Ref Range Previous Result Ref Range   SARS-CoV-2, Rapid Not Detected (2/26/2022) Not Detected Not in Time Range      Lab Results   Component Value Date    COVID19 Not Detected 02/26/2022    COVID19 Not Detected 10/17/2020       No results for input(s): VANCOTROUGH in the last 72 hours.     Imaging Studies:   TTE procedure: 2D Echocardiogram, M-Mode, Doppler, Color Doppler. 02/28/2022 10:23 AM    Summary  Mild to moderate left ventricular hypertrophy  Global left ventricular systolic function is mildly reduced  Estimated ejection fraction is 40-45 % . Evidence of diastolic dysfunction. Mild tricuspid regurgitation. Mild pulmonary hypertension, RVSP 43. Signature  Electronically signed by Amari Oscar) on 02/28/2022 02:22 PM  Electronically signed by Estevan Michel(Interpreting physician) on 02/28/2022 02:27 PM    Cultures:     Culture, Blood 1 [[id number]] Collected: 03/02/22 0520   Order Status: Completed Specimen: Blood Updated: 03/03/22 9231    Specimen Description . BLOOD    Culture NO GROWTH 1 DAY   Culture, Blood 1 [[id number]] Collected: 03/02/22 0520   Order Status: Completed Specimen: Blood Updated: 03/03/22 0820    Specimen Description . BLOOD    Culture NO GROWTH 1 DAY     Culture, Blood 1 [[id number]] (Abnormal) Collected: 02/26/22 1016   Order Status: Completed Specimen: Blood Updated: 02/28/22 0826    Specimen Description . BLOOD 10 ML  RIGHT HAND    Culture POSITIVE Blood Culture     DIRECT GRAM STAIN FROM BOTTLE: GRAM POSITIVE COCCI IN CLUSTERS     STAPHYLOCOCCUS AUREUS For susceptibility, refer to previous culture. Abnormal      (NOTE) Direct Gram Stain from bottle result called to and read back by: HODAN SANABRIA on 2/27/2022 at 00:45 AND COLE PORTER 2-27-22 AT 0640   Culture, Blood 1 [[id number]] (Abnormal)  Collected: 02/26/22 1000   Order Status: Completed Specimen: Blood Updated: 02/28/22 0825    Specimen Description . BLOOD    Special Requests RAC 12ML    Culture POSITIVE Blood Culture     DIRECT GRAM STAIN FROM BOTTLE: GRAM POSITIVE COCCI IN CLUSTERS     Staphylococcus aureus Detected: mecA/C and MREJ Not Detected Methodology- Polymerase Chain Reaction (PCR)     STAPHYLOCOCCUS AUREUS Abnormal      (NOTE) Direct Gram Stain from bottle and Polymerase Chain Reaction (PCR) results called to and read back by: HODAN SANABRIA on 2/27/2022 at 00:45 AND TO TIA S 2-27-22 AT 8735   Susceptibility     Staphylococcus aureus     BACTERIAL SUSCEPTIBILITY PANEL GLENN     clindamycin <=0.25  Sensitive     erythromycin <=0.25  Sensitive     gentamicin <=0.5  Sensitive 1     levofloxacin <=0.12  Sensitive     oxacillin <=0.25  Sensitive     penicillin 0.25  Resistant     tetracycline <=1  Sensitive     trimethoprim-sulfamethoxazole <=10  Sensitive        Medications:      amLODIPine  5 mg Oral Daily    metoprolol succinate  50 mg Oral Daily    heparin (porcine)  5,000 Units SubCUTAneous 3 times per day    ceFAZolin  1,000 mg IntraVENous Daily    aspirin  81 mg Oral Daily    chrissie-pito  1 tablet Oral Daily    lanthanum  1,000 mg Oral TID WC    midodrine  10 mg Oral TID WC    sodium chloride flush  5-40 mL IntraVENous 2 times per day           [facility]  Octavio Perez MD  [employer] messaging  OFFICE: [phone]      Electronically signed by Octavio Perez MD on 3/4/2022 at 7:54 AM  Thank you for allowing us to participate in the care of this patient. Please call with questions. This note iscreated with the assistance of a speech recognition program.  While intending to generate a document that actually reflects the content of the visit, the document can still have some errors including those of syntax andsound a like substitutions which may escape proof reading. In such instances, actual meaning can be extrapolated by contextual diversion.

## 2022-03-04 NOTE — PROGRESS NOTES
Renal Progress Note    Patient :  Amilcar Martin; 62 y.o. MRN# [id number]  Location:  [id number]/7588-47  Attending:  Amari Galo DO  Admit Date:  2/26/2022   Hospital Day: 6      Subjective:     Patient was seen and examined. No new issues reported overnight. Has positive MSSA sepsis, on IV antibiotics and infectious diseases on board. Ancef continues    Status post tunneled catheter exchanged by IR 2/28/2022. Normally gets dialysis as per TTS schedule. Had regular dialysis yesterday ran for 210 minutes and got about 2 L removed. 2D echo 2/28/2022: EF 40 to 45%. Evidence of diastolic dysfunction. Mild tricuspid regurgitation. Mild pulmonary hypertension RVSP 43. Mild to moderate left ventricular hypertrophy. Patient had hemodialysis yesterday 3/3/2022. Blood cultures from 3/2/22, last positive culture 2/26/2022. Plan for discharge home today. Ancef to continue with dialysis for a total of 4 weeks. Outpatient Medications:     Medications Prior to Admission: gabapentin (NEURONTIN) 300 MG capsule, Take 300 mg by mouth daily as needed.    Methoxy PEG-Epoetin Beta (MIRCERA) 30 MCG/0.3ML SOSY, Inject 30 mcg as directed every 14 days Patient takes this with dialysis every 2 weeks  Cholecalciferol (VITAMIN D3) 1.25 MG (78405 UT) CAPS, Take 1.25 capsules by mouth See Admin Instructions Patient takes this medication 3 times a week with dialysis  methocarbamol (ROBAXIN) 750 MG tablet, Take 750 mg by mouth 3 times daily as needed   lanthanum (FOSRENOL) 1000 MG chewable tablet, Take 1,000 mg by mouth 3 times daily (with meals)   metoprolol succinate (TOPROL XL) 50 MG extended release tablet, Take 50 mg by mouth daily   aspirin 81 MG chewable tablet, Take 81 mg by mouth daily  midodrine (PROAMATINE) 10 MG tablet, Take 10 mg by mouth 2 times daily as needed (at dialysis as needed)   cinacalcet (SENSIPAR) 60 MG tablet, 60 mg every Tuesday Thursday and Saturday with dialysis (Patient taking differently: Take 60 mg by mouth three times a week 60 mg every  and Saturday with dialysis)  [DISCONTINUED] ceFAZolin (ANCEF) 1 g injection, 3 gm with each dialysis session. End date of 10/30/20    Current Medications:     Scheduled Meds:    amLODIPine  5 mg Oral Daily    metoprolol succinate  50 mg Oral Daily    heparin (porcine)  5,000 Units SubCUTAneous 3 times per day    ceFAZolin  1,000 mg IntraVENous Daily    aspirin  81 mg Oral Daily    chrissie-pito  1 tablet Oral Daily    lanthanum  1,000 mg Oral TID WC    midodrine  10 mg Oral TID WC    sodium chloride flush  5-40 mL IntraVENous 2 times per day     Continuous Infusions:    sodium chloride       PRN Meds:  sodium citrate, sodium citrate, perflutren lipid microspheres, ondansetron, sodium chloride flush, sodium chloride, acetaminophen **OR** acetaminophen    Input/Output:       No intake/output data recorded. .      Patient Vitals for the past 96 hrs (Last 3 readings):   Weight   22 0513 242 lb 4.8 oz (109.9 kg)   22 1613 276 lb 0.3 oz (125.2 kg)   22 1218 280 lb 8 oz (127.2 kg)       Vital Signs:   Temperature:  Temp: 97.7 °F (36.5 °C)  TMax:   Temp (24hrs), Av.9 °F (36.6 °C), Min:97.7 °F (36.5 °C), Max:98.1 °F (36.7 °C)    Respirations:  Resp: 16  Pulse:   Pulse: 64  BP:    BP: (!) 140/83  BP Range: Systolic (11CIZ), RNX:272 , Min:114 , GXN:807       Diastolic (98OTH), ZEK:18, Min:75, Max:89      Physical Examination:     General:  AAO x 3, speaking in full sentences, no accessory muscle use. HEENT: Atraumatic, normocephalic, no throat congestion, moist mucosa. Eyes:   Pupils equal, round and reactive to light, EOMI. Neck:   Supple  Chest:   Bilateral vesicular breath sounds, no rales or wheezes. Cardiac:  S1 S2 RR, no murmurs, gallops or rubs. Abdomen: Soft, non-tender, no masses or organomegaly, BS audible. :   No suprapubic or flank tenderness. Neuro:  AAO x 3, No FND. SKIN:  No rashes, good skin turgor.   Extremities:  +ve 1+ edema. Labs:       Recent Labs     03/02/22  0521 03/03/22  0520 03/04/22  0629   WBC 6.0 7.7 9.5   RBC 2.77* 2.82* 2.86*   HGB 8.4* 8.6* 8.8*   HCT 26.1* 26.7* 27.1*   MCV 94.2 94.7 94.8   MCH 30.3 30.5 30.8   MCHC 32.2 32.2 32.5   RDW 13.9 13.7 14.0    152 165   MPV 11.9 11.8 11.2      BMP:   Recent Labs     03/02/22  0521 03/03/22  0520 03/04/22  0629    136 135   K 4.3 4.5 4.3   CL 95* 96* 96*   CO2 28 27 26   BUN 36* 49* 31*   CREATININE 10.71* 13.14* 10.06*   GLUCOSE 83 90 83   CALCIUM 9.6 9.9 9.5      Phosphorus:     Recent Labs     03/02/22  0521 03/03/22  0520 03/04/22  0629   PHOS 2.9 3.2 3.2     Magnesium:    Recent Labs     03/02/22  0521 03/03/22  0520 03/04/22  0629   MG 2.4 2.5 2.3     Albumin:    No results for input(s): LABALBU in the last 72 hours. BNP:      Lab Results   Component Value Date    BNP NOT REPORTED 08/21/2014     SPEP:  Lab Results   Component Value Date    PROT 7.0 02/27/2022       Radiology:     Reviewed. Assessment:     1. ESRD on HD on TTS schedule at [facility] hemodialysis unit via tunnel catheter in the right groin under Dr. Marielos Irving.  2.  MSSA sepsis, likely from dialysis catheter status post dialysis catheter exchange over guidewire. Poor vascular access. Blood cultures from 3/2/22 negative. 3. Anemia of chronic disease, stable hemoglobin  4. Secondary hyperparathyroidism. 5. Hypertension. 6. Coronary artery disease. 7. Ischemic cardiomyopathy status post AICD. Patient fraction 40 to 45%  8. Diabetes type 2. Plan:   1. Hemodialysis today. Aim for 2 to 3 days of fluid removal  2. Ancef with dialysis for 4 weeks as per infectious disease per ESRD dosing. 3. Okay to discharge from nephrology standpoint. 4.  Outpatient dialysis tomorrow per schedule  5. Fluid and potassium restriction in diet  6. Patient to follow-up with his primary nephrologist after discharge    Nutrition   Please ensure that patient is on a renal diet/TF.  Avoid nephrotoxic drugs/contrast exposure. We will continue to follow along with you.

## 2022-03-04 NOTE — PROGRESS NOTES
Patient discharged to home in stable condition. Patient is to have dialysis tomorrow. Patient read and understood discharge instructions. Patient went home in medical cab. Patient picked up new medications at pharmacy on the way out.

## 2022-03-04 NOTE — DISCHARGE INSTR - OTHER ORDERS
Continue with dialysis Tuesday, Thursday and Saturday at [facility].

## 2022-03-04 NOTE — PROGRESS NOTES
CLINICAL PHARMACY NOTE: MEDS TO BEDS    Total # of Prescriptions Filled: 1   The following medications were delivered to the patient:  · AMLODIPINE 5MG    Additional Documentation:

## 2022-03-04 NOTE — DISCHARGE INSTR - DIET

## 2022-03-07 LAB
CULTURE: NORMAL
CULTURE: NORMAL
SPECIMEN DESCRIPTION: NORMAL
SPECIMEN DESCRIPTION: NORMAL

## 2022-05-31 ENCOUNTER — APPOINTMENT (OUTPATIENT)
Dept: CT IMAGING | Age: 62
DRG: 393 | End: 2022-05-31
Payer: COMMERCIAL

## 2022-05-31 ENCOUNTER — APPOINTMENT (OUTPATIENT)
Dept: GENERAL RADIOLOGY | Age: 62
DRG: 393 | End: 2022-05-31
Payer: COMMERCIAL

## 2022-05-31 ENCOUNTER — HOSPITAL ENCOUNTER (INPATIENT)
Age: 62
LOS: 2 days | Discharge: HOME | DRG: 393 | End: 2022-06-02
Attending: EMERGENCY MEDICINE | Admitting: INTERNAL MEDICINE
Payer: COMMERCIAL

## 2022-05-31 DIAGNOSIS — A41.9 SEPTICEMIA (HCC): ICD-10-CM

## 2022-05-31 DIAGNOSIS — K61.1 PERI-RECTAL ABSCESS: Primary | ICD-10-CM

## 2022-05-31 PROBLEM — R09.89 PULMONARY HYPERINFLATION: Status: ACTIVE | Noted: 2022-05-31

## 2022-05-31 PROBLEM — K62.89 PROCTITIS: Status: ACTIVE | Noted: 2022-05-31

## 2022-05-31 PROBLEM — K61.0 PERIANAL ABSCESS: Status: ACTIVE | Noted: 2022-05-31

## 2022-05-31 PROBLEM — E16.2 HYPOGLYCEMIA: Status: ACTIVE | Noted: 2022-05-31

## 2022-05-31 PROBLEM — M72.6 NECROTIZING FASCIITIS (HCC): Status: ACTIVE | Noted: 2022-05-31

## 2022-05-31 LAB
ABSOLUTE EOS #: 0 K/UL (ref 0–0.4)
ABSOLUTE IMMATURE GRANULOCYTE: 0 K/UL (ref 0–0.3)
ABSOLUTE LYMPH #: 1.05 K/UL (ref 1–4.8)
ABSOLUTE MONO #: 2.45 K/UL (ref 0.1–0.8)
ALBUMIN SERPL-MCNC: 3.7 G/DL (ref 3.5–5.2)
ALBUMIN/GLOBULIN RATIO: 0.9 (ref 1–2.5)
ALP BLD-CCNC: 95 U/L (ref 40–129)
ALT SERPL-CCNC: 6 U/L (ref 5–41)
ANION GAP SERPL CALCULATED.3IONS-SCNC: 19 MMOL/L (ref 9–17)
AST SERPL-CCNC: 18 U/L
BASOPHILS # BLD: 0 % (ref 0–2)
BASOPHILS ABSOLUTE: 0 K/UL (ref 0–0.2)
BILIRUB SERPL-MCNC: 0.54 MG/DL (ref 0.3–1.2)
BUN BLDV-MCNC: 34 MG/DL (ref 8–23)
CALCIUM SERPL-MCNC: 9.4 MG/DL (ref 8.6–10.4)
CHLORIDE BLD-SCNC: 91 MMOL/L (ref 98–107)
CO2: 25 MMOL/L (ref 20–31)
CREAT SERPL-MCNC: 12.28 MG/DL (ref 0.7–1.2)
EOSINOPHILS RELATIVE PERCENT: 0 % (ref 1–4)
ESTIMATED AVERAGE GLUCOSE: 97 MG/DL
GFR AFRICAN AMERICAN: 5 ML/MIN
GFR NON-AFRICAN AMERICAN: 4 ML/MIN
GFR SERPL CREATININE-BSD FRML MDRD: ABNORMAL ML/MIN/{1.73_M2}
GLUCOSE BLD-MCNC: 69 MG/DL (ref 70–99)
HBA1C MFR BLD: 5 % (ref 4–6)
HCT VFR BLD CALC: 31.5 % (ref 40.7–50.3)
HEMOGLOBIN: 9.9 G/DL (ref 13–17)
IMMATURE GRANULOCYTES: 0 %
INR BLD: 1.1
LACTIC ACID, SEPSIS WHOLE BLOOD: 1 MMOL/L (ref 0.5–1.9)
LACTIC ACID, SEPSIS WHOLE BLOOD: 1.3 MMOL/L (ref 0.5–1.9)
LACTIC ACID, SEPSIS WHOLE BLOOD: 3 MMOL/L (ref 0.5–1.9)
LYMPHOCYTES # BLD: 6 % (ref 24–44)
MCH RBC QN AUTO: 30 PG (ref 25.2–33.5)
MCHC RBC AUTO-ENTMCNC: 31.4 G/DL (ref 28.4–34.8)
MCV RBC AUTO: 95.5 FL (ref 82.6–102.9)
MONOCYTES # BLD: 14 % (ref 1–7)
MORPHOLOGY: NORMAL
NRBC AUTOMATED: 0 PER 100 WBC
PARTIAL THROMBOPLASTIN TIME: 27.7 SEC (ref 20.5–30.5)
PDW BLD-RTO: 14.9 % (ref 11.8–14.4)
PLATELET # BLD: 202 K/UL (ref 138–453)
PMV BLD AUTO: 10.9 FL (ref 8.1–13.5)
POTASSIUM SERPL-SCNC: 4.3 MMOL/L (ref 3.7–5.3)
PRO-BNP: 1685 PG/ML
PROTHROMBIN TIME: 11.6 SEC (ref 9.1–12.3)
RBC # BLD: 3.3 M/UL (ref 4.21–5.77)
SEG NEUTROPHILS: 80 % (ref 36–66)
SEGMENTED NEUTROPHILS ABSOLUTE COUNT: 14 K/UL (ref 1.8–7.7)
SODIUM BLD-SCNC: 135 MMOL/L (ref 135–144)
TOTAL PROTEIN: 7.7 G/DL (ref 6.4–8.3)
TROPONIN, HIGH SENSITIVITY: 118 NG/L (ref 0–22)
TROPONIN, HIGH SENSITIVITY: 122 NG/L (ref 0–22)
TROPONIN, HIGH SENSITIVITY: 123 NG/L (ref 0–22)
TSH SERPL DL<=0.05 MIU/L-ACNC: 0.67 UIU/ML (ref 0.3–5)
WBC # BLD: 17.5 K/UL (ref 3.5–11.3)

## 2022-05-31 PROCEDURE — 85025 COMPLETE CBC W/AUTO DIFF WBC: CPT

## 2022-05-31 PROCEDURE — 74177 CT ABD & PELVIS W/CONTRAST: CPT

## 2022-05-31 PROCEDURE — 2580000003 HC RX 258: Performed by: STUDENT IN AN ORGANIZED HEALTH CARE EDUCATION/TRAINING PROGRAM

## 2022-05-31 PROCEDURE — 6360000002 HC RX W HCPCS: Performed by: STUDENT IN AN ORGANIZED HEALTH CARE EDUCATION/TRAINING PROGRAM

## 2022-05-31 PROCEDURE — 85730 THROMBOPLASTIN TIME PARTIAL: CPT

## 2022-05-31 PROCEDURE — 87040 BLOOD CULTURE FOR BACTERIA: CPT

## 2022-05-31 PROCEDURE — 2500000003 HC RX 250 WO HCPCS: Performed by: STUDENT IN AN ORGANIZED HEALTH CARE EDUCATION/TRAINING PROGRAM

## 2022-05-31 PROCEDURE — 84443 ASSAY THYROID STIM HORMONE: CPT

## 2022-05-31 PROCEDURE — 80053 COMPREHEN METABOLIC PANEL: CPT

## 2022-05-31 PROCEDURE — 71045 X-RAY EXAM CHEST 1 VIEW: CPT

## 2022-05-31 PROCEDURE — 6370000000 HC RX 637 (ALT 250 FOR IP): Performed by: INTERNAL MEDICINE

## 2022-05-31 PROCEDURE — 99222 1ST HOSP IP/OBS MODERATE 55: CPT | Performed by: INTERNAL MEDICINE

## 2022-05-31 PROCEDURE — 99285 EMERGENCY DEPT VISIT HI MDM: CPT

## 2022-05-31 PROCEDURE — 90935 HEMODIALYSIS ONE EVALUATION: CPT

## 2022-05-31 PROCEDURE — 93005 ELECTROCARDIOGRAM TRACING: CPT | Performed by: STUDENT IN AN ORGANIZED HEALTH CARE EDUCATION/TRAINING PROGRAM

## 2022-05-31 PROCEDURE — 96367 TX/PROPH/DG ADDL SEQ IV INF: CPT

## 2022-05-31 PROCEDURE — 2580000003 HC RX 258: Performed by: INTERNAL MEDICINE

## 2022-05-31 PROCEDURE — 96365 THER/PROPH/DIAG IV INF INIT: CPT

## 2022-05-31 PROCEDURE — 83605 ASSAY OF LACTIC ACID: CPT

## 2022-05-31 PROCEDURE — 99226 PR SBSQ OBSERVATION CARE/DAY 35 MINUTES: CPT | Performed by: INTERNAL MEDICINE

## 2022-05-31 PROCEDURE — 6360000002 HC RX W HCPCS: Performed by: INTERNAL MEDICINE

## 2022-05-31 PROCEDURE — 85610 PROTHROMBIN TIME: CPT

## 2022-05-31 PROCEDURE — 6370000000 HC RX 637 (ALT 250 FOR IP): Performed by: STUDENT IN AN ORGANIZED HEALTH CARE EDUCATION/TRAINING PROGRAM

## 2022-05-31 PROCEDURE — 6360000004 HC RX CONTRAST MEDICATION: Performed by: STUDENT IN AN ORGANIZED HEALTH CARE EDUCATION/TRAINING PROGRAM

## 2022-05-31 PROCEDURE — 84484 ASSAY OF TROPONIN QUANT: CPT

## 2022-05-31 PROCEDURE — 36415 COLL VENOUS BLD VENIPUNCTURE: CPT

## 2022-05-31 PROCEDURE — 83880 ASSAY OF NATRIURETIC PEPTIDE: CPT

## 2022-05-31 PROCEDURE — 1200000000 HC SEMI PRIVATE

## 2022-05-31 PROCEDURE — 5A1D70Z PERFORMANCE OF URINARY FILTRATION, INTERMITTENT, LESS THAN 6 HOURS PER DAY: ICD-10-PCS | Performed by: INTERNAL MEDICINE

## 2022-05-31 PROCEDURE — 83036 HEMOGLOBIN GLYCOSYLATED A1C: CPT

## 2022-05-31 PROCEDURE — 96375 TX/PRO/DX INJ NEW DRUG ADDON: CPT

## 2022-05-31 RX ORDER — FAMOTIDINE 20 MG/1
20 TABLET, FILM COATED ORAL DAILY
Status: DISCONTINUED | OUTPATIENT
Start: 2022-05-31 | End: 2022-06-02 | Stop reason: HOSPADM

## 2022-05-31 RX ORDER — 0.9 % SODIUM CHLORIDE 0.9 %
250 INTRAVENOUS SOLUTION INTRAVENOUS PRN
Status: DISCONTINUED | OUTPATIENT
Start: 2022-05-31 | End: 2022-06-02 | Stop reason: HOSPADM

## 2022-05-31 RX ORDER — ASPIRIN 81 MG/1
81 TABLET, CHEWABLE ORAL DAILY
Status: DISCONTINUED | OUTPATIENT
Start: 2022-05-31 | End: 2022-06-02 | Stop reason: HOSPADM

## 2022-05-31 RX ORDER — GABAPENTIN 300 MG/1
300 CAPSULE ORAL NIGHTLY
Status: DISCONTINUED | OUTPATIENT
Start: 2022-05-31 | End: 2022-06-02 | Stop reason: HOSPADM

## 2022-05-31 RX ORDER — AMLODIPINE BESYLATE 10 MG/1
5 TABLET ORAL DAILY
Status: DISCONTINUED | OUTPATIENT
Start: 2022-05-31 | End: 2022-06-02 | Stop reason: HOSPADM

## 2022-05-31 RX ORDER — HEPARIN SODIUM 1000 [USP'U]/ML
2600 INJECTION, SOLUTION INTRAVENOUS; SUBCUTANEOUS PRN
Status: DISCONTINUED | OUTPATIENT
Start: 2022-05-31 | End: 2022-06-02 | Stop reason: HOSPADM

## 2022-05-31 RX ORDER — LANTHANUM CARBONATE 500 MG/1
1000 TABLET, CHEWABLE ORAL
Status: DISCONTINUED | OUTPATIENT
Start: 2022-05-31 | End: 2022-06-02 | Stop reason: HOSPADM

## 2022-05-31 RX ORDER — METRONIDAZOLE 500 MG/1
500 TABLET ORAL EVERY 8 HOURS SCHEDULED
Status: DISCONTINUED | OUTPATIENT
Start: 2022-05-31 | End: 2022-06-02 | Stop reason: HOSPADM

## 2022-05-31 RX ORDER — 0.9 % SODIUM CHLORIDE 0.9 %
150 INTRAVENOUS SOLUTION INTRAVENOUS PRN
Status: DISCONTINUED | OUTPATIENT
Start: 2022-05-31 | End: 2022-06-02 | Stop reason: HOSPADM

## 2022-05-31 RX ORDER — CHOLECALCIFEROL (VITAMIN D3) 1250 MCG
1.25 CAPSULE ORAL SEE ADMIN INSTRUCTIONS
Status: DISCONTINUED | OUTPATIENT
Start: 2022-05-31 | End: 2022-05-31

## 2022-05-31 RX ORDER — ACETAMINOPHEN 325 MG/1
650 TABLET ORAL EVERY 6 HOURS PRN
Status: DISCONTINUED | OUTPATIENT
Start: 2022-05-31 | End: 2022-06-02 | Stop reason: HOSPADM

## 2022-05-31 RX ORDER — HEPARIN SODIUM 5000 [USP'U]/ML
5000 INJECTION, SOLUTION INTRAVENOUS; SUBCUTANEOUS 3 TIMES DAILY
Status: DISCONTINUED | OUTPATIENT
Start: 2022-05-31 | End: 2022-06-01

## 2022-05-31 RX ORDER — CLINDAMYCIN PHOSPHATE 600 MG/50ML
600 INJECTION INTRAVENOUS EVERY 8 HOURS
Status: DISCONTINUED | OUTPATIENT
Start: 2022-05-31 | End: 2022-05-31

## 2022-05-31 RX ORDER — CLINDAMYCIN PHOSPHATE 900 MG/50ML
900 INJECTION INTRAVENOUS ONCE
Status: DISCONTINUED | OUTPATIENT
Start: 2022-05-31 | End: 2022-05-31

## 2022-05-31 RX ORDER — METOPROLOL SUCCINATE 50 MG/1
50 TABLET, EXTENDED RELEASE ORAL DAILY
Status: DISCONTINUED | OUTPATIENT
Start: 2022-05-31 | End: 2022-06-02 | Stop reason: HOSPADM

## 2022-05-31 RX ORDER — MIDODRINE HYDROCHLORIDE 5 MG/1
10 TABLET ORAL 2 TIMES DAILY PRN
Status: DISCONTINUED | OUTPATIENT
Start: 2022-05-31 | End: 2022-06-02 | Stop reason: HOSPADM

## 2022-05-31 RX ORDER — METHOCARBAMOL 500 MG/1
750 TABLET, FILM COATED ORAL 3 TIMES DAILY
Status: DISCONTINUED | OUTPATIENT
Start: 2022-05-31 | End: 2022-06-02 | Stop reason: HOSPADM

## 2022-05-31 RX ORDER — SODIUM CHLORIDE 0.9 % (FLUSH) 0.9 %
5-40 SYRINGE (ML) INJECTION PRN
Status: DISCONTINUED | OUTPATIENT
Start: 2022-05-31 | End: 2022-06-02 | Stop reason: HOSPADM

## 2022-05-31 RX ORDER — ACETAMINOPHEN 650 MG/1
650 SUPPOSITORY RECTAL EVERY 6 HOURS PRN
Status: DISCONTINUED | OUTPATIENT
Start: 2022-05-31 | End: 2022-06-02 | Stop reason: HOSPADM

## 2022-05-31 RX ORDER — CHOLECALCIFEROL (VITAMIN D3) 10 MCG
1 TABLET ORAL DAILY
Status: DISCONTINUED | OUTPATIENT
Start: 2022-05-31 | End: 2022-06-02 | Stop reason: HOSPADM

## 2022-05-31 RX ORDER — ALBUTEROL SULFATE 2.5 MG/3ML
2.5 SOLUTION RESPIRATORY (INHALATION) EVERY 4 HOURS PRN
Status: DISCONTINUED | OUTPATIENT
Start: 2022-05-31 | End: 2022-06-02 | Stop reason: HOSPADM

## 2022-05-31 RX ORDER — DEXTROSE MONOHYDRATE 25 G/50ML
25 INJECTION, SOLUTION INTRAVENOUS ONCE
Status: COMPLETED | OUTPATIENT
Start: 2022-05-31 | End: 2022-05-31

## 2022-05-31 RX ORDER — SODIUM CHLORIDE 0.9 % (FLUSH) 0.9 %
5-40 SYRINGE (ML) INJECTION EVERY 12 HOURS SCHEDULED
Status: DISCONTINUED | OUTPATIENT
Start: 2022-05-31 | End: 2022-06-02 | Stop reason: HOSPADM

## 2022-05-31 RX ORDER — CINACALCET 30 MG/1
60 TABLET, FILM COATED ORAL
Status: DISCONTINUED | OUTPATIENT
Start: 2022-05-31 | End: 2022-06-02 | Stop reason: HOSPADM

## 2022-05-31 RX ORDER — SODIUM CHLORIDE 9 MG/ML
INJECTION, SOLUTION INTRAVENOUS PRN
Status: DISCONTINUED | OUTPATIENT
Start: 2022-05-31 | End: 2022-06-02 | Stop reason: HOSPADM

## 2022-05-31 RX ADMIN — METOPROLOL SUCCINATE 50 MG: 50 TABLET, FILM COATED, EXTENDED RELEASE ORAL at 21:14

## 2022-05-31 RX ADMIN — NEPHROCAP 1 MG: 1 CAP ORAL at 11:45

## 2022-05-31 RX ADMIN — CEFEPIME HYDROCHLORIDE 2000 MG: 2 INJECTION, POWDER, FOR SOLUTION INTRAVENOUS at 05:45

## 2022-05-31 RX ADMIN — GABAPENTIN 300 MG: 300 CAPSULE ORAL at 21:15

## 2022-05-31 RX ADMIN — SODIUM CHLORIDE, PRESERVATIVE FREE 10 ML: 5 INJECTION INTRAVENOUS at 21:17

## 2022-05-31 RX ADMIN — HEPARIN SODIUM 5000 UNITS: 5000 INJECTION INTRAVENOUS; SUBCUTANEOUS at 21:15

## 2022-05-31 RX ADMIN — METRONIDAZOLE 500 MG: 500 TABLET, FILM COATED ORAL at 21:14

## 2022-05-31 RX ADMIN — ALTEPLASE 2.6 MG: 2.2 INJECTION, POWDER, LYOPHILIZED, FOR SOLUTION INTRAVENOUS at 14:50

## 2022-05-31 RX ADMIN — DEXTROSE MONOHYDRATE 25 G: 25 INJECTION, SOLUTION INTRAVENOUS at 09:26

## 2022-05-31 RX ADMIN — ALTEPLASE 2.6 MG: 2.2 INJECTION, POWDER, LYOPHILIZED, FOR SOLUTION INTRAVENOUS at 14:51

## 2022-05-31 RX ADMIN — METHOCARBAMOL TABLETS 750 MG: 500 TABLET, COATED ORAL at 21:14

## 2022-05-31 RX ADMIN — LANTHANUM CARBONATE 1000 MG: 500 TABLET, CHEWABLE ORAL at 11:50

## 2022-05-31 RX ADMIN — IOPAMIDOL 150 ML: 755 INJECTION, SOLUTION INTRAVENOUS at 07:03

## 2022-05-31 ASSESSMENT — ENCOUNTER SYMPTOMS
ABDOMINAL PAIN: 0
ABDOMINAL DISTENTION: 0
APNEA: 0
NAUSEA: 0
COLOR CHANGE: 0
CONSTIPATION: 1
SHORTNESS OF BREATH: 0
VOMITING: 0
DIARRHEA: 0
EYE DISCHARGE: 0
SORE THROAT: 0
RECTAL PAIN: 1
CONSTIPATION: 0

## 2022-05-31 ASSESSMENT — PAIN DESCRIPTION - DESCRIPTORS: DESCRIPTORS: ACHING;DISCOMFORT

## 2022-05-31 ASSESSMENT — PAIN DESCRIPTION - LOCATION
LOCATION: COCCYX
LOCATION: COCCYX

## 2022-05-31 ASSESSMENT — PAIN - FUNCTIONAL ASSESSMENT: PAIN_FUNCTIONAL_ASSESSMENT: 0-10

## 2022-05-31 ASSESSMENT — LIFESTYLE VARIABLES: HOW OFTEN DO YOU HAVE A DRINK CONTAINING ALCOHOL: NEVER

## 2022-05-31 ASSESSMENT — PAIN SCALES - GENERAL: PAINLEVEL_OUTOF10: 6

## 2022-05-31 NOTE — PROGRESS NOTES
Pharmacy Note     Renal Dose Adjustment    Perry Wood is a 62 y.o. male. Pharmacist assessment of renally cleared medications. Recent Labs     05/31/22 0537   BUN 34*       Recent Labs     05/31/22  0537   CREATININE 12.28*       Estimated Creatinine Clearance: 8 mL/min (A) (based on SCr of 12.28 mg/dL [facility])).   ESRD on HD    Height:   Ht Readings from Last 1 Encounters:   02/26/22 6' 1\" (1.854 m)     Weight:  Wt Readings from Last 1 Encounters:   05/31/22 240 lb (108.9 kg)       The following medication dose has been adjusted based upon renal function per P&T Guidelines:             Famotidine 20 mg BID changed to daily    Jenniffer Garcia, PharmD, BCCCP  5/31/2022  11:12 AM

## 2022-05-31 NOTE — CONSULTS
General Surgery:ER  Consult Note        PATIENT NAME: Hardeep Barrera     YOB: 1960    ADMISSION DATE: 5/31/2022  4:03 AM     Consulted Physician: Dr Wilfredo Bernard DATE: 5/31/2022    Chief Complaint: Perirectal/rectal pain abnormal CAT scan    Consult Regarding:  Rectal pain, possible abscess    HISTORY OF PRESENT ILLNESS:  The patient is a 62 y.o. male with extensive past medical history including but not limited to atrial fibrillation, CHF, AICD, ESRD on dialysis, diabetes, COPD who presents to the emergency department at [facility] where he is seen and evaluated with complaints of rectal pain. The patient states that this has been going on for about a week. He states that he can feel the pain when he is having a bowel movement and when he is performing hygiene. He states that he has noticed 1 episode of blood in the stool over the past week. He does endorse history of similar event last year at which time he had a colonoscopy at [facility] and was told that he had polyps. Otherwise, the patient denies any bright red blood per rectum, denies feeling any lesions in the perineum or anal region, denies any drainage of pus. Denies fever, chills, chest pain. Patient had CAT scan of abdomen and pelvis with abnormal imaging in the rectal area.     Past Medical History:        Diagnosis Date    A-V fistula ([address]     lt arm    AICD (automatic cardioverter/defibrillator) present 2006    Asthma     Atrial fibrillation ([address]     Blood transfusion reaction     CAD (coronary artery disease)     dr Ralph Brenner cardiologist recently aicd check /clearance 4/15    CHF (congestive heart failure) ([address]     CHF (congestive heart failure) ([address]     COPD (chronic obstructive pulmonary disease) ([address]     CRF (chronic renal failure)     Diabetes mellitus ([address]     IDDM    Dialysis patient ([address]     GERD (gastroesophageal reflux disease)     Hemodialysis patient ([address]     amanda [id number]-sat  Hypertension     treated w/ meds    Obesity        Past Surgical History:        Procedure Laterality Date    CARDIAC DEFIBRILLATOR PLACEMENT  2006    IR TUNNELED CATHETER PLACEMENT GREATER THAN 5 YEARS  2/28/2022    IR TUNNELED CATHETER PLACEMENT GREATER THAN 5 YEARS 2/28/2022 [facility]    PACEMAKER PLACEMENT      VASCULAR SURGERY      L avf       Medications Prior to Admission: See epic list  Not in a hospital admission. Allergies:  Spironolactone    Social History:   Social History     Socioeconomic History    Marital status: Legally      Spouse name: Not on file    Number of children: Not on file    Years of education: Not on file    Highest education level: Not on file   Occupational History    Not on file   Tobacco Use    Smoking status: Never Smoker    Smokeless tobacco: Never Used   Substance and Sexual Activity    Alcohol use: No    Drug use: No    Sexual activity: Not on file   Other Topics Concern    Not on file   Social History Narrative    Not on file     Social Determinants of Health     Financial Resource Strain:     Difficulty of Paying Living Expenses: Not on file   Food Insecurity:     Worried About Running Out of Food in the Last Year: Not on file    Hali of Food in the Last Year: Not on file   Transportation Needs:     Lack of Transportation (Medical): Not on file    Lack of Transportation (Non-Medical):  Not on file   Physical Activity:     Days of Exercise per Week: Not on file    Minutes of Exercise per Session: Not on file   Stress:     Feeling of Stress : Not on file   Social Connections:     Frequency of Communication with Friends and Family: Not on file    Frequency of Social Gatherings with Friends and Family: Not on file    Attends Caodaism Services: Not on file    Active Member of Clubs or Organizations: Not on file    Attends Club or Organization Meetings: Not on file    Marital Status: Not on file   Intimate Partner Violence:     Fear of Current or Ex-Partner: Not on file    Emotionally Abused: Not on file    Physically Abused: Not on file    Sexually Abused: Not on file   Housing Stability:     Unable to Pay for Housing in the Last Year: Not on file    Number of Places Lived in the Last Year: Not on file    Unstable Housing in the Last Year: Not on file       Family History:       Adopted: Yes       REVIEW OF SYSTEMS:    CONSTITUTIONAL: Denies recent weight loss, fatigue, fevers, chills. HEENT: Denies rhinorrhea, dysphagia, odynphagia. CARDIOVASCULAR: Denies chest pain  RESPIRATORY: + SOB  GASTROINTESTINAL: + rectal pain  GENITOURINARY: Denies increased frequency or dysuria is on hemodialysis. HEMATOLOGIC/LYMPHATIC: Denies history of anemia or DVTs. ENDOCRINE: + diabetes  NEURO: Denies history of CVA, TIA. Review of systems negative unless listed above. PHYSICAL EXAM:    VITALS:  BP (!) 130/43   Pulse 94   Temp 99.8 °F (37.7 °C) (Oral)   Resp 14   Wt 240 lb (108.9 kg)   SpO2 100%   BMI 31.66 kg/m²   INTAKE/OUTPUT:   No intake or output data in the 24 hours ending 05/31/22 0916    CONSTITUTIONAL:  awake, alert, not distressed  HEENT: Normocephalic/atraumatic, without obvious abnormality  NECK:  Supple, symmetrical, trachea midline   CARDIOVASCULAR: Regular rate and rhythm  LUNGS: Mildly increased WOB  ABDOMEN: Obese, soft, nodistended   ANORECTAL: No external abnormalities noted, no wounds, no fluctuance or induration, on CHRISTIE there is tenderness to palpation circumferentially without palpable abnormality, no gross blood on examining finger no crepitance or open wound  MUSCULOSKELETAL: Muscle strength intact in all extremities bilaterally  NEUROLOGIC: CN II- XII intact.  Gross motor intact without focal weakness    CBC with Differential:    Lab Results   Component Value Date    WBC 17.5 05/31/2022    RBC 3.30 05/31/2022    HGB 9.9 05/31/2022    HCT 31.5 05/31/2022     05/31/2022    MCV 95.5 05/31/2022 MCH 30.0 05/31/2022    MCHC 31.4 05/31/2022    RDW 14.9 05/31/2022    METASPCT 1 04/14/2015    LYMPHOPCT 6 05/31/2022    MONOPCT 14 05/31/2022    BASOPCT 0 05/31/2022    MONOSABS 2.45 05/31/2022    LYMPHSABS 1.05 05/31/2022    EOSABS 0.00 05/31/2022    BASOSABS 0.00 05/31/2022    DIFFTYPE NOT REPORTED 10/20/2020     BMP:    Lab Results   Component Value Date     05/31/2022    K 4.3 05/31/2022    CL 91 05/31/2022    CO2 25 05/31/2022    BUN 34 05/31/2022    LABALBU 3.7 05/31/2022    CREATININE 12.28 05/31/2022    CALCIUM 9.4 05/31/2022    GFRAA 5 05/31/2022    LABGLOM 4 05/31/2022    GLUCOSE 69 05/31/2022       Pertinent Radiology:   CT ABDOMEN PELVIS W IV CONTRAST Additional Contrast? None    Result Date: 5/31/2022  EXAMINATION: CT OF THE ABDOMEN AND PELVIS WITH CONTRAST 5/31/2022 6:36 am TECHNIQUE: CT of the abdomen and pelvis was performed with the administration of intravenous contrast. Multiplanar reformatted images are provided for review. Automated exposure control, iterative reconstruction, and/or weight based adjustment of the mA/kV was utilized to reduce the radiation dose to as low as reasonably achievable. COMPARISON: Abdomen and pelvis CT 02/26/2022 HISTORY: ORDERING SYSTEM PROVIDED HISTORY: Rule out [employer], febrile with rectal pain, most prominent in left cheek TECHNOLOGIST PROVIDED HISTORY: Rule out [employer], febrile with rectal pain, most prominent in left cheek Decision Support Exception - unselect if not a suspected or confirmed emergency medical condition->Emergency Medical Condition (MA) FINDINGS: LOWER CHEST:  Calcified granulomas in the left upper and right lower lobes. Discoid atelectasis or scarring in the basilar right lower lobe. Minimal gravity dependent atelectasis in the right lower lobe. Partially included dual-chamber pacemaker leads. Normal heart size. Mild to moderate concentric left ventricular hypertrophy aortic valve annular calcification.  No pleural nor pericardial effusions. Mild dilation of the pulmonary trunk but not out of proportion with the ascending thoracic aorta, and no significant dilation of intrapulmonary pulmonary artery branches out of proportion with accompanying bronchi. Elevation of the right hemidiaphragm potentially due to right phrenic nerve palsy. ORGANS:  Apparent surgical absence of the gallbladder. No intrahepatic nor extrahepatic biliary dilation. Moderate pancreatic atrophy. Borderline bilateral renal atrophy. Similar appearance of extensive simple appearing bilateral renal cysts measuring up to at least 3.5 cm. Normal liver, spleen, and adrenal glands. GI/BOWEL:  Mildly redundant transverse and sigmoid colon. Otherwise normal course and caliber of the stomach, small bowel, colon, and rectum without obstruction. Normal appendix. Patchy oral contrast in the appendix, colon, and rectum. Minimal stool. Irregular wall thickening associated with the mid to lower rectum with associated stranding in the mesorectal fat. Approximately 2.2 cm x 1.7 cm x 3.9 cm loculated fluid collection with suspected rim enhancement about the anus at least between 2-7 o'clock. PELVIS:  Decompressed urinary bladder with possible subtle perivesical stranding. Mild prostatomegaly. PERITONEUM/RETROPERITONEUM:  Minimal systemic atherosclerosis. Increased size of a few prominent inferior mesenteric and bilateral mesorectal lymph nodes; for reference, 1.2 cm x 0.8 cm right mesorectal node (0.9 cm x 0.5 cm on 02/26/2022). No abdominal lymphadenopathy. No free intraperitoneal fluid nor gas. SOFT TISSUES/BONES:  At least mild right and minimal left gynecomastia. Laxity of the anterior and lateral abdominal wall musculature. Tiny fat containing bilateral inguinal hernias. Persistence of a tunneled central venous catheter entering the right greater saphenous vein near the saphenofemoral junction and extending into the infrarenal inferior vena cava.  Subtle subcutaneous stranding in the medial buttocks near the anus. No soft tissue gas. No inguinal lymphadenopathy. No acute fractures nor suspicious bony lesions. 1. Approximally 2.2 cm x 1.7 cm x 3.5 cm perianal abscess as above. Consider further evaluation with MRI if there is concern for an associated sinus tract or fistula. 2. Irregular wall thickening in the mid to lower rectum with adjacent perirectal stranding potentially due to proctitis. However, malignancy could appear similar in should be considered given the presence of inferior mesenteric and bilateral mesorectal lymph nodes with increased prominence that could be metastatic or reactive. Consider endoscopy. 3. Suspicion for mild cellulitis in the medial buttocks near the anus. Findings of necrotizing fasciitis. 4. Potential for cystitis. 5. Borderline atrophic kidneys with numerous simple appearing cysts, potentially sequelae of chronic hemodialysis. The cysts are incompletely evaluated with complicated cysts and solid neoplasms not excluded. Recommend further evaluation with renal protocol abdomen MRI (preferred) or CT on a nonemergent basis. XR CHEST PORTABLE    Result Date: 5/31/2022  EXAMINATION: ONE XRAY VIEW OF THE CHEST 5/31/2022 5:14 am COMPARISON: 02/26/2022 HISTORY: ORDERING SYSTEM PROVIDED HISTORY: fatigue TECHNOLOGIST PROVIDED HISTORY: fatigue Reason for Exam: upr,fatigue,missed dialysis FINDINGS: Left-sided ICD is redemonstrated. HeRo catheter from the left side with the tip over the proximal right atrium. A stent over the right subclavian region is noted with narrowing of its midportion. The stent appears stable. Elevation of the right hemidiaphragm likely with some basilar atelectasis/scarring. The elevation appears stable. No new consolidation or diffuse opacities within the remaining lungs. Calcified granulomas in the left lung are present as well. The cardiomediastinal silhouette is stable. No pneumothorax is seen. Underpenetration of the spine but no obvious fractures are identified within the visualized ribs. Left-sided HERO catheter with the tip over the proximal right atrium. No pneumothorax is identified. The elevated right hemidiaphragm is redemonstrated likely with some basilar atelectasis/scarring. The remaining lungs do not have acute process. Stent over the right subclavian region is stable with narrowing of its midportion. ASSESSMENT:  Active Hospital Problems    Diagnosis Date Noted    Perianal abscess [K61.0] 05/31/2022    Proctitis [K62.89] 05/31/2022    Necrotizing fasciitis [facility]) [M72.6] 05/31/2022       62year old male with complex medical history who presents with rectal pain and intermittent rectal bleeding. History and physical exam are not consistent with necrotizing fasciitis. Diagnostic imaging was reviewed carefully and reveals mesorectal lymphadenopathy suggesting a longer duration process and possible intramural rim-enhancing collection at the level of the upper rectum which could represent an intramural abscess versus proctitis    Plan:  1. Recommend internal medicine for admission  2. Recommend nephrology consultation for ESRD and dialysis  3. At this time, would recommend transitioning antibiotics to Cipro and Flagyl IV  4. Will monitor the patient clinically  5. No plans at this time for operative intervention    The patient was seen and examined at the bedside with Dr. Ron Overton    Electronically signed by Devan Ledbetter MD  on 5/31/2022 at 9:16 AM   I attest that I was present in the emergency department with the resident during the patient's evaluation and agree with the description of findings and plan as dictated above.   Ramya Clark MD

## 2022-05-31 NOTE — PROGRESS NOTES
· Bronchodilator assessment   [x]    Bronchodilator Assessment    FEV1 PREDICTED 0  FEV1 actual: 0    Bronchodilator assessment at level  1  BRONCHODILATOR ASSESSMENT SCORE  Score 1 2 3 4   Breath Sounds   [x]  Clear []  Mild Wheezing with good aeration []  Moderate I/E wheezing with adequate aeration []  Poor Aeration or diffuse wheezing   Respiratory Rate [x]  Less than 20 []  20-25 []  Greater than 25  []  Greater than 35    Dyspnea []  No SOB  [x]  SOB with minimal activity []  Speaking in partial sentences []  Acute/ At rest   Peakflow (asthma) []  80 % or greater predicted/PB  [x]  Unable []  70% or greater predicted/PB  []  Unable []  51%-70% predicted/PB  []  Unable []  Less than 50% predicted/PB  []  Unable due to distress   FEV1 % Predicted []  Greater than 69%  [x]  Unable  []  Less than 50%-69%  []  Unable  []  Less than 35%-49%  []  Unable  []  Less than 35%  []  Unable due to distress       ADOLFO MARTINEZ, RCP                                FEMALE                                  MALE                            FEV1 Predicted Normal Values                        FEV1 Predicted Normal Values          Age                                     Height in Feet and Inches       Age                                     Height in Feet and Inches       4' 11\" 5' 1\" 5' 3\" 5' 5\" 5' 7\" 5' 9\" 5' 11\" 6' 1\"  4' 11\" 5' 1\" 5' 3\" 5' 5\" 5' 7\" 5' 9\" 5' 11\" 6' 1\"   42 - 45 2.49 2.66 2.84 3.03 3.22 3.42 3.62 3.83 42 - 45 2.82 3.03 3.26 3.49 3.72 3.96 4.22 4.47   46 - 49 2.40 2.57 2.76 2.94 3.14 3.33 3.54 3.75 46 - 49 2.70 2.92 3.14 3.37 3.61 3.85 4.10 4.36   50 - 53 2.31 2.48 2.66 2.85 3.04 3.24 3.45 3.66 50 - 53 2.58 2.80 3.02 3.25 3.49 3.73 3.98 4.24   54 - 57 2.21 2.38 2.57 2.75 2.95 3.14 3.35 3.56 54 - 57 2.46 2.67 2.89 3.12 3.36 3.60 3.85 4.11   58 - 61 2.10 2.28 2.46 2.65 2.84 3.04 3.24 3.45 58 - 61 2.32 2.54 2.76 2.99 3.23 3.47 3.72 3.98   62 - 65 1.99 2.17 2.35 2.54 2.73 2.93 3.13 3.34 62 - 65 2.19 2.40 2.62 2.85 3.09 3. 33 3.58 3.84   66 - 69 1.88 2.05 2.23 2.42 2.61 2.81 3.02 3.23 66 - 69 2.04 2.26 2.48 2.71 2.95 3.19 3.44 3.70   70+ 1.82 1.99 2.17 2.36 2.55 2.75 2.95 3.16 70+ 1.97 2.19 2.41 2.64 2.87 3.12 3.37 3.62     Patient takes no respiratory medications at home.   Will make Resp treatments PRN

## 2022-05-31 NOTE — PROGRESS NOTES
[facility] Pharmacokinetic Monitoring Service - Vancomycin    Lizzeth Arevalo is a 62 y.o. male starting on vancomycin therapy for sepsis/ carly-rectal abscess. Pharmacy consulted by Dr Jared Dunn for monitoring and adjustment. Target Concentration: Pre-Dialysis Concentration 21-24 mg/L  Additional Antimicrobials: cefepime    Pertinent Laboratory Values: Wt Readings from Last 1 Encounters:   05/31/22 240 lb (108.9 kg)     Temp Readings from Last 1 Encounters:   05/31/22 99.8 °F (37.7 °C) (Oral)     Recent Labs     05/31/22  0537   CREATININE 12.28*   WBC 17.5*     Procalcitonin: NA    Pertinent Cultures:  Culture Date Source Results   5/31 Blood Pending   MRSA Nasal Swab: N/A. Non-respiratory infection.     Plan:  Concentration-guided dosing due to renal impairment and intermittent hemodialysis   Start vancomycin 2000 mg given in ED, will follow for HD schedule  Vancomycin concentration not ordered yet  Pharmacy will continue to monitor patient and adjust therapy as indicated    Thank you for the consult,  Mallory Ellsworth, [facility]  5/31/2022 11:28 AM Render Post-Care Instructions In Note?: yes Detail Level: Detailed Consent: The patient's verbal consent was obtained including but not limited to risks of crusting, scabbing, blistering, scarring, darker or lighter pigmentary change, recurrence, incomplete removal and infection. Duration Of Freeze Thaw-Cycle (Seconds): 0 Number Of Freeze-Thaw Cycles: 1 freeze-thaw cycle Post-Care Instructions: I reviewed post-care instructions with the patient in detail: pt should expect treated lesion(s) to form a scab for ~1 week, pt may apply Vaseline or Aquaphor to crusted or scabbing areas if desired. Advised to monitor and return to clinic for re-treatment or biopsy if lesion(s) remain unresolved in ~2 months.

## 2022-05-31 NOTE — ED PROVIDER NOTES
[address] HANDOFF       Handoff taken on the following patient from prior Attending Physician:  Pt Name: Tomasa Odom  PCP:  No primary care provider on file. Attestation  I was available and discussed any additional care issues that arose and coordinated the management plans with the resident(s) caring for the patient during my duty period. Any areas of disagreement with resident's documentation of care or procedures are noted on the chart. I was personally present for the key portions of any/all procedures during my duty period. I have documented in the chart those procedures where I was not present during the key portions.              Jo Frey MD  05/31/22 [id number]

## 2022-05-31 NOTE — CONSULTS
[facility] -   Infectious diseases evaluation  admission date 5/31/2022    reason for consultation:   fascitis     Impression :   Current:  · Perianal abscess   · proctitis and satellite LN enlargement  · bandemia  · DM2, COPD, dialysis ESRD/ chest HERO graft, AICD, CHF  ·   Discussion / summary of stay / plan of care   ·   Recommendations   · Agree with cefepime and vancomycin with dialysis  · Add Flagyl and stop the clindamycin  · Await surg plans    Infection Control Recommendations   · [place] Precautions  · Contact Isolation       Antimicrobial Stewardship Recommendations   · Simplification of therapy  · Targeted therapy  History of Present Illness:   Initial history:  Brennan Humphrey is a 62y.o.-year-old male with a past history of diabetes and ESRD on dialysis, comes in with rectal pain, ongoing for about a week. Difficulty having bowel movements,  Last year had a colonoscopy at [facility] and was told he had polyps  No fever or chills    CT scan of the abdomen showed a rectal abscess with fasciitis and could not tell if there is any communication with the fistula-there was a proctitis as well any concern of rectal tumor.     White count is elevated, started on antibiotics, infectious consulted    Interval changes  5/31/2022   Patient Vitals for the past 8 hrs:   BP Temp Temp src Pulse Resp SpO2 Weight   05/31/22 0902 (!) 130/43 -- -- 94 14 100 % --   05/31/22 0732 133/65 -- -- (!) 102 14 96 % --   05/31/22 0602 (!) 129/39 -- -- 93 19 95 % --   05/31/22 0547 (!) 135/52 -- -- 89 14 95 % --   05/31/22 0517 (!) 132/59 -- -- 90 15 91 % --   05/31/22 0514 (!) 137/52 -- -- 90 14 -- --   05/31/22 0415 -- 99.8 °F (37.7 °C) Oral -- -- -- --   05/31/22 0345 114/68 (!) 100.6 °F (38.1 °C) Oral 100 18 92 % 240 lb (108.9 kg)       Summary of relevant labs:  Labs:  CREATININE 12.28 High Panic    [id number]   [id number] High      Micro:  BC  Imaging:  CXR  Impression:  Left-sided HERO catheter with the tip over the proximal right atrium. No   pneumothorax is identified. The elevated right hemidiaphragm is redemonstrated likely with some basilar   atelectasis/scarring. The remaining lungs do not have acute process. Stent over the right subclavian region is stable with narrowing of its   midportion. CTAP  Impression:  1. Approximally 2.2 cm x 1.7 cm x 3.5 cm perianal abscess as above. Consider   further evaluation with MRI if there is concern for an associated sinus tract   or fistula. 2. Irregular wall thickening in the mid to lower rectum with adjacent   perirectal stranding potentially due to proctitis. However, malignancy could   appear similar in should be considered given the presence of inferior   mesenteric and bilateral mesorectal lymph nodes with increased prominence   that could be metastatic or reactive. Consider endoscopy. 3. Suspicion for mild cellulitis in the medial buttocks near the anus. Findings of necrotizing fasciitis. 4. Potential for cystitis. 5. Borderline atrophic kidneys with numerous simple appearing cysts,   potentially sequelae of chronic hemodialysis. The cysts are incompletely   evaluated with complicated cysts and solid neoplasms not excluded. Recommend   further evaluation with renal protocol abdomen MRI (preferred) or CT on a   nonemergent basis. I have personally reviewed the past medical history, past surgical history, medications, social history, and family history, and I haveupdated the database accordingly. Allergies:   Spironolactone     Review of Systems:     Review of Systems   Constitutional: Negative for activity change. HENT: Negative for congestion. Eyes: Negative for discharge. Respiratory: Negative for apnea. Cardiovascular: Negative for chest pain. Gastrointestinal: Positive for constipation and rectal pain. Negative for abdominal distention. Endocrine: Negative for cold intolerance. Genitourinary: Negative for dysuria. Musculoskeletal: Negative for arthralgias. Skin: Negative for color change. Allergic/Immunologic: Negative for food allergies. Neurological: Negative for dizziness. Hematological: Negative for adenopathy. Psychiatric/Behavioral: Negative for agitation. Physical Examination :       Physical Exam  Constitutional:       Appearance: Normal appearance. He is not ill-appearing. HENT:      Head: Normocephalic and atraumatic. Nose: Nose normal.      Mouth/Throat:      Mouth: Mucous membranes are moist.   Eyes:      General: No scleral icterus. Pupils: Pupils are equal, round, and reactive to light. Cardiovascular:      Rate and Rhythm: Normal rate and regular rhythm. Heart sounds: Normal heart sounds. No murmur heard. Pulmonary:      Effort: No respiratory distress. Breath sounds: Normal breath sounds. Abdominal:      General: There is no distension. Palpations: Abdomen is soft. Tenderness: There is no abdominal tenderness. Genitourinary:     Comments: No lozano  Musculoskeletal:         General: No swelling or deformity. Cervical back: Neck supple. No rigidity. Skin:     General: Skin is dry. Findings: Lesion present. Neurological:      General: No focal deficit present. Mental Status: He is alert. Mental status is at baseline. Psychiatric:         Mood and Affect: Mood normal.         Thought Content:  Thought content normal.         Past Medical History:     Past Medical History:   Diagnosis Date    A-V fistula ([address]     lt arm    AICD (automatic cardioverter/defibrillator) present 2006    Asthma     Atrial fibrillation ([address]     Blood transfusion reaction     CAD (coronary artery disease)     dr Rica Busch cardiologist recently aicd check /clearance 4/15    CHF (congestive heart failure) ([address]     CHF (congestive heart failure) ([address]     COPD (chronic obstructive pulmonary disease) ([address]     CRF (chronic renal failure)     Diabetes mellitus (HCC)     IDDM    [facility]     GERD (gastroesophageal reflux disease)     [facility]     laskey tues-thurs-sat    Hypertension     treated w/ meds    Obesity        Past Surgical  History:     Past Surgical History:   Procedure Laterality Date    CARDIAC DEFIBRILLATOR PLACEMENT  2006    IR TUNNELED CATHETER PLACEMENT GREATER THAN 5 YEARS  2/28/2022    IR TUNNELED CATHETER PLACEMENT GREATER THAN 5 YEARS 2/28/2022 [facility]    PACEMAKER PLACEMENT      VASCULAR SURGERY      L avf       Medications:      clindamycin (CLEOCIN) IV  900 mg IntraVENous Once       Social History:     Social History     Socioeconomic History    Marital status: Legally      Spouse name: Not on file    Number of children: Not on file    Years of education: Not on file    Highest education level: Not on file   Occupational History    Not on file   Tobacco Use    Smoking status: Never Smoker    Smokeless tobacco: Never Used   Substance and Sexual Activity    Alcohol use: No    Drug use: No    Sexual activity: Not on file   Other Topics Concern    Not on file   Social History Narrative    Not on file     Social Determinants of Health     Financial Resource Strain:     Difficulty of Paying Living Expenses: Not on file   Food Insecurity:     Worried About Running Out of Food in the Last Year: Not on file    Hali of Food in the Last Year: Not on file   Transportation Needs:     Lack of Transportation (Medical): Not on file    Lack of Transportation (Non-Medical):  Not on file   Physical Activity:     Days of Exercise per Week: Not on file    Minutes of Exercise per Session: Not on file   Stress:     Feeling of Stress : Not on file   Social Connections:     Frequency of Communication with Friends and Family: Not on file    Frequency of Social Gatherings with Friends and Family: Not on file    Attends Taoism Services: Not on file    Active Member of Clubs or Organizations: Not on file    Attends Club or Organization Meetings: Not on file    Marital Status: Not on file   Intimate Partner Violence:     Fear of Current or Ex-Partner: Not on file    Emotionally Abused: Not on file    Physically Abused: Not on file    Sexually Abused: Not on file   Housing Stability:     Unable to Pay for Housing in the Last Year: Not on file    Number of [place] in the Last Year: Not on file    Unstable Housing in the Last Year: Not on file       Family History:     Family History   Adopted: Yes      Medical Decision Making:   I have independently reviewed/ordered the following labs:    CBC with Differential:   Recent Labs     05/31/22 0537   WBC 17.5*   HGB 9.9*   HCT 31.5*      LYMPHOPCT 6*   MONOPCT 14*     BMP:  Recent Labs     05/31/22 0537      K 4.3   CL 91*   CO2 25   BUN 34*   CREATININE 12.28*     Hepatic Function Panel:   Recent Labs     05/31/22 0537   PROT 7.7   LABALBU 3.7   BILITOT 0.54   ALKPHOS 95   ALT 6   AST 18     No results for input(s): RPR in the last 72 hours. No results for input(s): HIV in the last 72 hours. No results for input(s): BC in the last 72 hours. Lab Results   Component Value Date    CREATININE 12.28 05/31/2022    GLUCOSE 69 05/31/2022       Detailed results: Thank you for allowing us to participate in the care of this patient. Please call with questions. This note is created with the assistance of a speech recognition program.  While intending to generate adocument that actually reflects the content of the visit, the document can still have some errors including those of syntax and sound a like substitutions which may escape proof reading. It such instances, actual meaningcan be extrapolated by contextual diversion.     Omer Alvarado MD  Office: [phone]  Perfect serve / office [phone]

## 2022-05-31 NOTE — PROGRESS NOTES
Pharmacy Note     Renal Dose Adjustment    Reddy Tate is a 62 y.o. male. Pharmacist assessment of renally cleared medications. Recent Labs     05/31/22  0537   BUN 34*       Recent Labs     05/31/22  0537   CREATININE 12.28*       Estimated Creatinine Clearance: 8 mL/min (A) (based on SCr of 12.28 mg/dL [facility])).   ESRD on HD    Height:   Ht Readings from Last 1 Encounters:   02/26/22 6' 1\" (1.854 m)     Weight:  Wt Readings from Last 1 Encounters:   05/31/22 240 lb (108.9 kg)       The following medication dose has been adjusted based upon renal function per P&T Guidelines:             Cefepime 2g Q8 changed to 1g Q24    Abundio MonsivaisD, BCCCP  5/31/2022  11:17 AM

## 2022-05-31 NOTE — PROGRESS NOTES
[facility]   Department of Internal Medicine - Staff Internal Medicine Teaching Service        Senior Note    Date: 5/31/2022  Patient Name: Lizzeth Arevalo  MRN: [id number]  Date of Admission: 5/31/2022  4:03 AM  YOB: 1960  Primary Care Physician: No primary care provider on file. Attending Physician: Shayy Last MD   Room: 18/18    Brief Summary:-  Lizzeth Arevalo is an 62 y.o. male who presented to the ER with chief complaints of rectal pain. Denies any constipation/diarrhea or abdominal pain. Patient reported that he had an episode of rectal bleeding last year for which colonoscopy was done, with removal of polyps. Patient is on dialysis follows up with nephrologist Dr Julio C Sotomayor, via right femoral cath. He had multiple fistula failure in the past.    PMH significant for   ESRD on HD, TTS schedule follow-up with Dr. Lesa Urban cardiomyopathy with EF 40 to 45% (ECHO 2/28/22) s/p AICD  COPD not on home oxygen  Paroxysmal A. fib on Lopressor, not on Eliquis. Anemia of chronic disease likely secondary to ESRD  DM type II last HbA1c 5.4 in 2015  Hypertension      Assessment and Plan:-    Principal Problem:    Maricarmen-rectal abscess  Active Problems:    Perianal abscess    Proctitis    Necrotizing fasciitis ([address]    Hypoglycemia    Pulmonary hyperinflation    Biventricular CHF (congestive heart failure) ([facility]) with icd chronic     Paroxysmal atrial fibrillation (HCC)    Controlled type 2 diabetes mellitus with chronic kidney disease on chronic dialysis, without long-term current use of insulin (HCC)    Lactic acidosis    ESRD (end stage renal disease) on dialysis ([facility])    Leukocytosis    Anemia of chronic disease    CAD (coronary artery disease)  Resolved Problems:    * No resolved hospital problems.  *      -Perianal abscess versus proctitis: Continue antibiotic, currently on cefazolin, vancomycin and clindamycin due to concern for necrotizing fasciitis on may have occurred.

## 2022-05-31 NOTE — ED PROVIDER NOTES
[facility] ED  Emergency Department  Emergency Medicine Resident Sign-out     Care of Perry Wood was assumed from Dr. Stefani Bailey and is being seen for Fatigue (feeling tired after dialysis on Sat. ) and Other (rectum pain )   . The patient's initial evaluation and plan have been discussed with the prior provider who initially evaluated the patient. EMERGENCY DEPARTMENT COURSE / MEDICAL DECISION MAKING:       MEDICATIONS GIVEN:  Orders Placed This Encounter   Medications    cefepime (MAXIPIME) 2000 mg IVPB minibag in NS     Order Specific Question:   Antimicrobial Indications     Answer: Other     Order Specific Question:   Other Abx Indication     Answer: Suspected Sepsis of Skin or Soft Tissue Origin    DISCONTD: vancomycin (VANCOCIN) 2,500 mg in dextrose 5 % 500 mL IVPB     Order Specific Question:   Antimicrobial Indications     Answer: Other     Order Specific Question:   Other Abx Indication     Answer: Suspected Sepsis of Skin or Soft Tissue Origin    DISCONTD: clindamycin (CLEOCIN) 900 mg in dextrose 5 % 50 mL IVPB     Order Specific Question:   Antimicrobial Indications     Answer: Other     Order Specific Question:   Other Abx Indication     Answer:    Suspected Sepsis of Skin or Soft Tissue Origin    DISCONTD: iopamidol (ISOVUE-370) 76 % injection 75 mL    iopamidol (ISOVUE-370) 76 % injection 150 mL    vancomycin (VANCOCIN) 2000 mg in sodium chloride 0.9 % 500 mL IVPB     Order Specific Question:   Antimicrobial Indications     Answer:   Sepsis of Unknown Etiology    dextrose 50 % IV solution    amLODIPine (NORVASC) tablet 5 mg    aspirin chewable tablet 81 mg    b complex-C-folic acid (NEPHROCAPS) capsule 1 mg    DISCONTD: Vitamin D3 CAPS 50,000 Units    cinacalcet (SENSIPAR) tablet 60 mg    gabapentin (NEURONTIN) capsule 300 mg    lanthanum (FOSRENOL) chewable tablet 1,000 mg    methocarbamol (ROBAXIN) tablet 750 mg    epoetin que-epbx (RETACRIT) injection 5,000 Units    metoprolol succinate (TOPROL XL) extended release tablet 50 mg    midodrine (PROAMATINE) tablet 10 mg    sodium chloride flush 0.9 % injection 5-40 mL    sodium chloride flush 0.9 % injection 5-40 mL    0.9 % sodium chloride infusion    heparin (porcine) injection 5,000 Units    OR Linked Order Group     acetaminophen (TYLENOL) tablet 650 mg     acetaminophen (TYLENOL) suppository 650 mg    famotidine (PEPCID) tablet 20 mg    DISCONTD: cefepime (MAXIPIME) 2000 mg IVPB minibag in NS     Order Specific Question:   Antimicrobial Indications     Answer:   Skin and Soft Tissue Infection     Order Specific Question:   Skin duration of therapy     Answer: Other     Order Specific Question:   Other Skin and Soft Tissue Infection Duration     Answer: Once    DISCONTD: cefepime (MAXIPIME) 1000 mg IVPB minibag     Order Specific Question:   Antimicrobial Indications     Answer:   Skin and Soft Tissue Infection     Order Specific Question:   Skin duration of therapy     Answer:   7 days    DISCONTD: vancomycin (VANCOCIN) 1750 mg in sodium chloride 0.9 % 500 mL IVPB     Order Specific Question:   Antimicrobial Indications     Answer: Other     Order Specific Question:   Other Abx Indication     Answer: Suspected Sepsis of Skin or Soft Tissue Origin    DISCONTD: clindamycin (CLEOCIN) 600 mg in dextrose 5 % 50 mL IVPB     Order Specific Question:   Antimicrobial Indications     Answer:   Skin and Soft Tissue Infection     Order Specific Question:   Skin duration of therapy     Answer:   7 days    metroNIDAZOLE (FLAGYL) tablet 500 mg     Order Specific Question:   Antimicrobial Indications     Answer:   Skin and Soft Tissue Infection     Order Specific Question:   Skin duration of therapy     Answer:    Other     Order Specific Question:   Other Skin and Soft Tissue Infection Duration     Answer:   perianal abscess and fascitis - unclear end of tx for now    cefepime (MAXIPIME) 1,000 mg in sterile water 10 mL IV syringe     Order Specific Question:   Antimicrobial Indications     Answer:   Skin and Soft Tissue Infection     Order Specific Question:   Skin duration of therapy     Answer:   7 days    vancomycin (VANCOCIN) intermittent dosing (placeholder)     Order Specific Question:   Antimicrobial Indications     Answer:   Skin and Soft Tissue Infection     Order Specific Question:   Skin duration of therapy     Answer:   7 days       LABS / RADIOLOGY:     Labs Reviewed   CBC WITH AUTO DIFFERENTIAL - Abnormal; Notable for the following components:       Result Value    WBC 17.5 (*)     RBC 3.30 (*)     Hemoglobin 9.9 (*)     Hematocrit 31.5 (*)     RDW 14.9 (*)     Seg Neutrophils 80 (*)     Lymphocytes 6 (*)     Monocytes 14 (*)     Eosinophils % 0 (*)     Segs Absolute 14.00 (*)     Absolute Mono # 2.45 (*)     All other components within normal limits   TROPONIN - Abnormal; Notable for the following components:    Troponin, High Sensitivity 122 (*)     All other components within normal limits   TROPONIN - Abnormal; Notable for the following components:    Troponin, High Sensitivity 118 (*)     All other components within normal limits   BRAIN NATRIURETIC PEPTIDE - Abnormal; Notable for the following components:    Pro-BNP 1,685 (*)     All other components within normal limits   COMPREHENSIVE METABOLIC PANEL W/ REFLEX TO MG FOR LOW K - Abnormal; Notable for the following components:    Glucose 69 (*)     BUN 34 (*)     CREATININE 12.28 (*)     Chloride 91 (*)     Anion Gap 19 (*)     Albumin/Globulin Ratio 0.9 (*)     GFR Non- 4 (*)     GFR  5 (*)     All other components within normal limits   LACTATE, SEPSIS - Abnormal; Notable for the following components:    Lactic Acid, Sepsis, Whole Blood 3.0 (*)     All other components within normal limits   CULTURE, BLOOD 1   CULTURE, BLOOD 2   CULTURE, URINE   PROTIME-INR   APTT   LACTATE, SEPSIS   TSH WITH REFLEX URINALYSIS WITH MICROSCOPIC   LACTATE, SEPSIS   LACTATE, SEPSIS   TROPONIN   TROPONIN   HEMOGLOBIN A1C   TROPONIN       CT ABDOMEN PELVIS W IV CONTRAST Additional Contrast? None   Final Result   1. Approximally 2.2 cm x 1.7 cm x 3.5 cm perianal abscess as above. Consider   further evaluation with MRI if there is concern for an associated sinus tract   or fistula. 2. Irregular wall thickening in the mid to lower rectum with adjacent   perirectal stranding potentially due to proctitis. However, malignancy could   appear similar in should be considered given the presence of inferior   mesenteric and bilateral mesorectal lymph nodes with increased prominence   that could be metastatic or reactive. Consider endoscopy. 3. Suspicion for mild cellulitis in the medial buttocks near the anus. Findings of necrotizing fasciitis. 4. Potential for cystitis. 5. Borderline atrophic kidneys with numerous simple appearing cysts,   potentially sequelae of chronic hemodialysis. The cysts are incompletely   evaluated with complicated cysts and solid neoplasms not excluded. Recommend   further evaluation with renal protocol abdomen MRI (preferred) or CT on a   nonemergent basis. XR CHEST PORTABLE   Final Result   Left-sided HERO catheter with the tip over the proximal right atrium. No   pneumothorax is identified. The elevated right hemidiaphragm is redemonstrated likely with some basilar   atelectasis/scarring. The remaining lungs do not have acute process. Stent over the right subclavian region is stable with narrowing of its   midportion. RECENT VITALS:     Temp: 99.8 °F (37.7 °C),  Heart Rate: 94, Resp: 14, BP: (!) 130/43, SpO2: 100 %    This patient is a 62 y.o. Male with perirectal abscess with SEPSIS identified upon initial evaluation by previous resident.   Patient is currently pending surgery evaluation    Sepsis Times and Checklist  Vital Signs: BP: (!) 130/43  Heart Rate: 94 Resp: 14  Temp: 99.8 °F (37.7 °C) SpO2: 100 %  SIRS (>2)   Temp > 38.3C or < 36C   HR > 90   RR > 20   WBC > 12 or < 4 or >10% bands  SIRS (>2) and confirmed or suspected source of infection = Sepsis  Is Sepsis due to likely bacterial infection?: Yes    Sepsis Identified: on arrival   Sepsis Orders:  ·  CBC(required): Yes  ·  CMP: Yes  ·  PT/PTT: Yes  ·  Blood Cultures x2(required): Yes  ·  Lactate(required): Yes  ·  Antibiotics Given (within 3 hours of sepsis identification, after blood cultures):  Yes    (If unable to obtain IV access for IV antibiotics within 3 hours of identification of sepsis, IM antibiotics is acceptable.)              Fluids must be initiated within 3 hours of sepsis identification. ED Course as of 05/31/22 1127   Tue May 31, 2022   0803 1. Approximally 2.2 cm x 1.7 cm x 3.5 cm perianal abscess as above. Consider  further evaluation with MRI if there is concern for an associated sinus tract  or fistula. [GP]   0803 Dr. Sara Madrigal of nephro agreeable to CT scan with contact  [GP]   0804 Troponin, High Sensitivity(!!): 118  Downtrending  [GP]   0804 Lactic Acid, Sepsis, Whole Blood: 1.3  Downtrending  [GP]   0804 WBC(!): 17.5  Febrile, tachycardia, sirs  [GP]      ED Course User Index  [GP] Nic Delgado MD   Patient admitted to the internal medicine teaching service, received IV antibiotics, general surgery following, no plans of operative management at this time as per general surgery    OUTSTANDING TASKS / RECOMMENDATIONS:      1. Speak with general surgery, dispo     FINAL IMPRESSION:     1. Maricarmen-rectal abscess    2. Septicemia ([facility]        DISPOSITION:       DISPOSITION:  []  Discharge   []  Transfer -    [x]  Admission -  IM   []  Against Medical Advice   []  Eloped   FOLLOW-UP: No follow-up provider specified.    DISCHARGE MEDICATIONS: New Prescriptions    No medications on file          Nic Delgado MD  Emergency Medicine Resident  [facility] Susanna Ferris MD  Resident  05/31/22 8135

## 2022-05-31 NOTE — ED PROVIDER NOTES
[facility]  ED  Emergency Department Encounter  Emergency Medicine Resident     Pt Name:Jeremy Levine  MRN: [id number]  Birthdate 1960  Date of evaluation: 5/31/22  PCP:  No primary care provider on file. CHIEF COMPLAINT       Chief Complaint   Patient presents with    Fatigue     feeling tired after dialysis on Sat.  Other     rectum pain        HISTORY OF PRESENT ILLNESS  (Location/Symptom, Timing/Onset, Context/Setting, Quality, Duration, Modifying Factors, Severity.)      Sonia Candelaria is a 62 y.o. male who presents with 1 week rectal pain followed by fatigue and generalized weakness. ESRD on IHD, TTS. Did not feel better after dialysis. Hx DM, not on insulin. Subjective fevers. No n/v, CP, SOB, abdominal pain. Has had painful bowel movements as well. PAST MEDICAL / SURGICAL / SOCIAL / FAMILY HISTORY      has a past medical history of A-V fistula ([address] AICD (automatic cardioverter/defibrillator) present, Asthma, Atrial fibrillation ([address] Blood transfusion reaction, CAD (coronary artery disease), CHF (congestive heart failure) ([address] CHF (congestive heart failure) ([address] COPD (chronic obstructive pulmonary disease) ([address] CRF (chronic renal failure), Diabetes mellitus ([address] Dialysis patient ([address] GERD (gastroesophageal reflux disease), Hemodialysis patient ([address] Hypertension, and Obesity. has a past surgical history that includes vascular surgery; Cardiac defibrillator placement (2006); pacemaker placement; and IR TUNNELED CVC PLACE WO SQ PORT/PUMP > 5 YEARS (2/28/2022).     Social History     Socioeconomic History    Marital status: Legally      Spouse name: Not on file    Number of children: Not on file    Years of education: Not on file    Highest education level: Not on file   Occupational History    Not on file   Tobacco Use    Smoking status: Never Smoker    Smokeless tobacco: Never Used   Substance and Sexual Activity    Alcohol use: No    Drug use: No    Sexual activity: Not on file   Other Topics Concern    Not on file   Social History Narrative    Not on file     Social Determinants of Health     Financial Resource Strain:     Difficulty of Paying Living Expenses: Not on file   Food Insecurity:     Worried About Running Out of Food in the Last Year: Not on file    Hali of Food in the Last Year: Not on file   Transportation Needs:     Lack of Transportation (Medical): Not on file    Lack of Transportation (Non-Medical): Not on file   Physical Activity:     Days of Exercise per Week: Not on file    Minutes of Exercise per Session: Not on file   Stress:     Feeling of Stress : Not on file   Social Connections:     Frequency of Communication with Friends and Family: Not on file    Frequency of Social Gatherings with Friends and Family: Not on file    Attends [employer] Services: Not on file    Active Member of [address] [place] [address] [employer] or Organizations: Not on file    Attends Club or Organization Meetings: Not on file    Marital Status: Not on file   Intimate Partner Violence:     Fear of Current or Ex-Partner: Not on file    Emotionally Abused: Not on file    Physically Abused: Not on file    Sexually Abused: Not on file   Housing Stability:     Unable to Pay for Housing in the Last Year: Not on file    Number of [place] in the Last Year: Not on file    Unstable Housing in the Last Year: Not on file       Family History   Adopted: Yes       Allergies:  Spironolactone    Home Medications:  Prior to Admission medications    Medication Sig Start Date End Date Taking? Authorizing Provider   amLODIPine (NORVASC) 5 MG tablet Take 1 tablet by mouth daily 3/5/22   [facility] DO Jimmy   B Complex-C-Folic Acid (LOLA-GRACE) TABS TAKE 1 TABLET BY MOUTH EVERY EVENING 3/4/22   Jose C Marquez DO   gabapentin (NEURONTIN) 300 MG capsule Take 300 mg by mouth daily as needed.      Historical Provider, MD   Methoxy PEG-Epoetin Beta (MIRCERA) 30 MCG/0.3ML SOSY Inject 30 mcg as directed every 14 days Patient takes this with dialysis every 2 weeks    Historical Provider, MD   Cholecalciferol (VITAMIN D3) 1.25 MG (10750 UT) CAPS Take 1.25 capsules by mouth See Admin Instructions Patient takes this medication 3 times a week with dialysis    Historical Provider, MD   cinacalcet (SENSIPAR) 60 MG tablet 60 mg every Tuesday Thursday and Saturday with dialysis  Patient taking differently: Take 60 mg by mouth three times a week 60 mg every Tuesday Thursday and Saturday with dialysis 10/21/20   Kulwant Earl MD   methocarbamol (ROBAXIN) 750 MG tablet Take 750 mg by mouth 3 times daily as needed     Historical Provider, MD   lanthanum (FOSRENOL) 1000 MG chewable tablet Take 1,000 mg by mouth 3 times daily (with meals)  7/15/19   Historical Provider, MD   metoprolol succinate (TOPROL XL) 50 MG extended release tablet Take 50 mg by mouth daily  7/3/19   Historical Provider, MD   aspirin 81 MG chewable tablet Take 81 mg by mouth daily    Historical Provider, MD   midodrine (PROAMATINE) 10 MG tablet Take 10 mg by mouth 2 times daily as needed (at dialysis as needed)     Historical Provider, MD       REVIEW OF SYSTEMS    (2-9 systems for level 4, 10 or more for level 5)      Review of Systems   Constitutional: Positive for fatigue and fever. HENT: Negative for sore throat. Eyes: Negative for visual disturbance. Respiratory: Negative for shortness of breath. Cardiovascular: Negative for chest pain. Gastrointestinal: Positive for rectal pain. Negative for abdominal pain, constipation, diarrhea, nausea and vomiting. Genitourinary: Negative for decreased urine volume. Musculoskeletal: Negative for arthralgias and myalgias. Skin: Negative for wound. Neurological: Positive for weakness (generalized). Negative for light-headedness and headaches. Psychiatric/Behavioral: Negative for confusion.        PHYSICAL EXAM   (up to 7 for level 4, 8 or more for level 5)      INITIAL VITALS:   BP 133/65   Pulse (!) 102   Temp 99.8 °F (37.7 °C) (Oral)   Resp 14   Wt 240 lb (108.9 kg)   SpO2 96%   BMI 31.66 kg/m²     Physical Exam  Vitals and nursing note reviewed. Constitutional:       General: He is not in acute distress. Appearance: Normal appearance. He is well-developed. He is obese. He is ill-appearing. He is not toxic-appearing. HENT:      Head: Normocephalic and atraumatic. Right Ear: External ear normal.      Left Ear: External ear normal.      Nose: Nose normal.      Mouth/Throat:      Mouth: Mucous membranes are moist.   Eyes:      Extraocular Movements: Extraocular movements intact. Pupils: Pupils are equal, round, and reactive to light. Neck:      Trachea: Trachea normal. No tracheal deviation. Cardiovascular:      Rate and Rhythm: Regular rhythm. Tachycardia present. Heart sounds: Normal heart sounds. Pulmonary:      Effort: Pulmonary effort is normal. No respiratory distress. Abdominal:      Palpations: Abdomen is soft. Tenderness: There is no abdominal tenderness. Genitourinary:     Comments: Perianal tenderness  Musculoskeletal:         General: No deformity. Normal range of motion. Cervical back: Normal range of motion and neck supple. No rigidity. Skin:     General: Skin is warm and dry. Capillary Refill: Capillary refill takes less than 2 seconds. Neurological:      General: No focal deficit present. Mental Status: He is alert and oriented to person, place, and time.    Psychiatric:         Mood and Affect: Mood normal.         Behavior: Behavior normal.         DIFFERENTIAL  DIAGNOSIS     PLAN (LABS / IMAGING / EKG):  Orders Placed This Encounter   Procedures    Culture, Urine    Culture, Blood 1    Culture, Blood 2    XR CHEST PORTABLE    CT ABDOMEN PELVIS W IV CONTRAST Additional Contrast? None    CBC with Auto Differential    Troponin    Brain Natriuretic Peptide    Comprehensive Metabolic Panel w/ Reflex to MG    Protime-INR    APTT    Urinalysis with Microscopic    Lactate, Sepsis    TSH with Reflex    Inpatient consult to Nephrology    Inpatient consult to General Surgery    Inpatient consult to Internal Medicine    EKG 12 Lead       MEDICATIONS ORDERED:  Orders Placed This Encounter   Medications    cefepime (MAXIPIME) 2000 mg IVPB minibag in NS     Order Specific Question:   Antimicrobial Indications     Answer: Other     Order Specific Question:   Other Abx Indication     Answer: Suspected Sepsis of Skin or Soft Tissue Origin    DISCONTD: vancomycin (VANCOCIN) 2,500 mg in dextrose 5 % 500 mL IVPB     Order Specific Question:   Antimicrobial Indications     Answer: Other     Order Specific Question:   Other Abx Indication     Answer: Suspected Sepsis of Skin or Soft Tissue Origin    clindamycin (CLEOCIN) 900 mg in dextrose 5 % 50 mL IVPB     Order Specific Question:   Antimicrobial Indications     Answer: Other     Order Specific Question:   Other Abx Indication     Answer: Suspected Sepsis of Skin or Soft Tissue Origin    DISCONTD: iopamidol (ISOVUE-370) 76 % injection 75 mL    iopamidol (ISOVUE-370) 76 % injection 150 mL    vancomycin (VANCOCIN) 2000 mg in sodium chloride 0.9 % 500 mL IVPB     Order Specific Question:   Antimicrobial Indications     Answer:   Sepsis of Unknown Etiology       DDX:     DIAGNOSTIC RESULTS / EMERGENCY DEPARTMENT COURSE / MDM     LABS:  Results for orders placed or performed during the hospital encounter of 05/31/22   Culture, Blood 1    Specimen: Blood   Result Value Ref Range    Specimen Description . BLOOD     Special Requests rt fa 10ml     Culture NO GROWTH <24 HRS    Culture, Blood 2    Specimen: Blood   Result Value Ref Range    Specimen Description . BLOOD     Special Requests RT FA 10ML     Culture NO GROWTH <24 HRS    CBC with Auto Differential   Result Value Ref Range    WBC 17.5 (H) 3.5 - 11.3 k/uL    RBC 3.30 (L) 4.21 - 5.77 m/uL    Hemoglobin 9.9 (L) 13.0 - 17.0 g/dL    Hematocrit 31.5 (L) 40.7 - 50.3 %    MCV 95.5 82.6 - 102.9 fL    MCH 30.0 25.2 - 33.5 pg    MCHC 31.4 28.4 - 34.8 g/dL    RDW 14.9 (H) 11.8 - 14.4 %    Platelets 106 867 - 205 k/uL    MPV 10.9 8.1 - 13.5 fL    NRBC Automated 0.0 0.0 per 100 WBC    Immature Granulocytes 0 0 %    Seg Neutrophils 80 (H) 36 - 66 %    Lymphocytes 6 (L) 24 - 44 %    Monocytes 14 (H) 1 - 7 %    Eosinophils % 0 (L) 1 - 4 %    Basophils 0 0 - 2 %    Absolute Immature Granulocyte 0.00 0.00 - 0.30 k/uL    Segs Absolute 14.00 (H) 1.8 - 7.7 k/uL    Absolute Lymph # 1.05 1.0 - 4.8 k/uL    Absolute Mono # 2.45 (H) 0.1 - 0.8 k/uL    Absolute Eos # 0.00 0.0 - 0.4 k/uL    Basophils Absolute 0.00 0.0 - 0.2 k/uL    Morphology Normal    Troponin   Result Value Ref Range    Troponin, High Sensitivity 122 (HH) 0 - 22 ng/L   Troponin   Result Value Ref Range    Troponin, High Sensitivity 118 (HH) 0 - 22 ng/L   Brain Natriuretic Peptide   Result Value Ref Range    Pro-BNP 1,685 (H) <300 pg/mL   Comprehensive Metabolic Panel w/ Reflex to MG   Result Value Ref Range    Glucose 69 (L) 70 - 99 mg/dL    BUN 34 (H) 8 - 23 mg/dL    CREATININE 12.28 (HH) 0.70 - 1.20 mg/dL    Calcium 9.4 8.6 - 10.4 mg/dL    Sodium 135 135 - 144 mmol/L    Potassium 4.3 3.7 - 5.3 mmol/L    Chloride 91 (L) 98 - 107 mmol/L    CO2 25 20 - 31 mmol/L    Anion Gap 19 (H) 9 - 17 mmol/L    Alkaline Phosphatase 95 40 - 129 U/L    ALT 6 5 - 41 U/L    AST 18 <40 U/L    Total Bilirubin 0.54 0.3 - 1.2 mg/dL    Total Protein 7.7 6.4 - 8.3 g/dL    Albumin 3.7 3.5 - 5.2 g/dL    Albumin/Globulin Ratio 0.9 (L) 1.0 - 2.5    GFR Non-African American 4 (L) >60 mL/min    GFR African American 5 (L) >60 mL/min    GFR Comment         Protime-INR   Result Value Ref Range    Protime 11.6 9.1 - 12.3 sec    INR 1.1    APTT   Result Value Ref Range    PTT 27.7 20.5 - 30.5 sec   Lactate, Sepsis   Result Value Ref Range    Lactic Acid, Sepsis, Whole Blood 3.0 (H) 0.5 - 1.9 mmol/L   Lactate, Sepsis   Result Value Ref Range    Lactic Acid, Sepsis, Whole Blood 1.3 0.5 - 1.9 mmol/L   TSH with Reflex   Result Value Ref Range    TSH 0.67 0.30 - 5.00 uIU/mL         RADIOLOGY:  CT ABDOMEN PELVIS W IV CONTRAST Additional Contrast? None   Final Result   1. Approximally 2.2 cm x 1.7 cm x 3.5 cm perianal abscess as above. Consider   further evaluation with MRI if there is concern for an associated sinus tract   or fistula. 2. Irregular wall thickening in the mid to lower rectum with adjacent   perirectal stranding potentially due to proctitis. However, malignancy could   appear similar in should be considered given the presence of inferior   mesenteric and bilateral mesorectal lymph nodes with increased prominence   that could be metastatic or reactive. Consider endoscopy. 3. Suspicion for mild cellulitis in the medial buttocks near the anus. Findings of necrotizing fasciitis. 4. Potential for cystitis. 5. Borderline atrophic kidneys with numerous simple appearing cysts,   potentially sequelae of chronic hemodialysis. The cysts are incompletely   evaluated with complicated cysts and solid neoplasms not excluded. Recommend   further evaluation with renal protocol abdomen MRI (preferred) or CT on a   nonemergent basis. XR CHEST PORTABLE   Final Result   Left-sided HERO catheter with the tip over the proximal right atrium. No   pneumothorax is identified. The elevated right hemidiaphragm is redemonstrated likely with some basilar   atelectasis/scarring. The remaining lungs do not have acute process. Stent over the right subclavian region is stable with narrowing of its   midportion.                EKG  EKG Interpretation    Interpreted by me    Rhythm: Sinus tachycardia  Rate: Tachycardic at 1 1 6 bpm  Axis: normal  Ectopy: none  Conduction: normal  ST Segments: no acute change  T Waves: no acute change  Q Waves: none    Clinical Impression: Sinus tachycardia    All EKG's are interpreted by the Emergency Department Physician who either signs or Co-signs this chart in the absence of a cardiologist.    EMERGENCY DEPARTMENT COURSE:  Patient found lying in bed, uncomfortable appearing but nontoxic. Engaged and cooperative in exam.  Physical exam notable for perianal tenderness, abdomen nonperitoneal.  Vital signs relatively stable, initially febrile and tachycardic. Sepsis work-up initiated, started on broad-spectrum antibiotics with Vanco cefepime and clindamycin to cover for necrotizing fasciitis given patient's risk factors and location of pain. CT imaging just modality, will require contrast.  Discussed case with nephrology. Dr. Haydee Jessu admit patient agree benefits outweigh risks. CT notable for perianal abscess and cellulitis. General surgery consulted and to evaluate patient, request medicine admission. Awaiting admission at time of signout, patient care transferred to Dr. Lidia Pereyra. Sepsis Times and Checklist  Vital Signs: BP: 133/65  Heart Rate: (!) 102  Resp: 14  Temp: 99.8 °F (37.7 °C) SpO2: 96 %  SIRS (>2)   Temp > 38.3C or < 36C   HR > 90   RR > 20   WBC > 12 or < 4 or >10% bands  SIRS (>2) and confirmed or suspected source of infection = Sepsis  Is Sepsis due to likely bacterial infection?: Yes  If not, then sepsis is due to likely  etiology. Sepsis Identified:  Date: 5/31/2022   Time: 0339  Sepsis Orders:  ·  CBC(required): Yes  ·  CMP: Yes  ·  PT/PTT: Yes  ·  Blood Cultures x2(required): Yes  ·  Urinalysis and Urine Culture: Yes  ·  Lactate(required): Yes  ·  Antibiotics Given (within 3 hours of sepsis identification, after blood cultures):  Yes    (If unable to obtain IV access for IV antibiotics within 3 hours of identification of sepsis, IM antibiotics is acceptable.)  ·             If lactate >2.0 MUST repeat within 6 hours    If elevated, is elevated lactate from a likely infectious source?: Yes.       IV Fluid Bolus:  Is lactate > 4.0: No      PROCEDURES:  None    CONSULTS:  IP CONSULT TO NEPHROLOGY  IP CONSULT TO GENERAL SURGERY  IP CONSULT TO INTERNAL MEDICINE    CRITICAL CARE:  None    FINAL IMPRESSION      1. Maricarmen-rectal abscess    2. Septicemia ([address]          DISPOSITION / PLAN     DISPOSITION Decision To Admit 05/31/2022 08:05:22 AM      PATIENT REFERRED TO:  No follow-up provider specified.     DISCHARGE MEDICATIONS:  New Prescriptions    No medications on file       Tj Sanchez MD  Emergency Medicine Resident    (Please note that portions of this note were completed with a voice recognition program.  Efforts were made to edit the dictations but occasionally words are mis-transcribed.)        Tj Sanchez MD  Resident  05/31/22 [id number]

## 2022-05-31 NOTE — ED PROVIDER NOTES
[facility]     Emergency Department     Faculty Attestation    I performed a history and physical examination of the patient and discussed management with the resident. I have reviewed and agree with the residents findings including all diagnostic interpretations, and treatment plans as written. Any areas of disagreement are noted on the chart. I was personally present for the key portions of any procedures. I have documented in the chart those procedures where I was not present during the key portions. I have reviewed the emergency nurses triage note. I agree with the chief complaint, past medical history, past surgical history, allergies, medications, social and family history as documented unless otherwise noted below. Documentation of the HPI, Physical Exam and Medical Decision Making performed by [email or web address] is based on my personal performance of the HPI, PE and MDM. For Physician Assistant/ Nurse Practitioner cases/documentation I have personally evaluated this patient and have completed at least one if not all key elements of the E/M (history, physical exam, and MDM). Additional findings are as noted. 61 yo M c/o fatigue, due for dialysis today, no fever, no cp, no sob,   pe t 100.6, flat affect,   Abdomen non tender, no distension, no rigidity, no guarding,   Tender L buttock more for Dr Denisse Renteria, I visualized anal verge & noted no perirectal mass, no gross bleeding, no calf tenderness, 1+ ankle edema,     -abx / ct / sepsis eval, > admit    EKG Interpretation    Interpreted by me  Sinus tachycardia, hr 106, no ischemia, normal axis, qtc 456    CRITICAL CARE: There was a high probability of clinically significant/life threatening deterioration in this patient's condition which required my urgent intervention. Total critical care time was 15 minutes. This excludes any time for separately reportable procedures.        Ann-Marie Hugo Rizzo, DO  05/31/22 1160 [facility],   05/31/22 [id number]

## 2022-05-31 NOTE — CONSULTS
[address] [facility] NOTE   July Davey 69 y o  female MRN: [id number]  Unit/Bed#: [facility] 913-01 Encounter: [id number]  Reason for Consult: ESRD    ASSESSMENT and PLAN:    70 yo female with PMHx of ESRD, HTN, dementia who presents with fall from SNF facility  Nephrology on board for ESRD     1) ESRD     - access is [facility], [place]  - HD next on Wednesday  - keppra dosed after dialysis on dialysis days  - hypoglycemia - per Primary team  - can change anti HTN to oral regimen once ok from Primary team/swallowing standpoint     2) fall     - CT head unrevealing of acute process  - CT spine without acute fracture  Degenerative changes  - CXR with increased opacity - being monitored by Primary team  - CPK improving as of 7/6     3) dementia     - per Primary team     4) electrolytes - stable     5) acid/base - stable     6) MBD - monitor PTH as outpatient     7) HTN     - on amlodipine and IV metoprolol  - hold parameters on amlodipine     8) Anemia     - no RUFUS due to CVA chronic noted on CT head     9) thrombocytopenia - unclear etiology or chronicity - per Primary team    SUBJECTIVE / INTERVAL HISTORY:    SBP labile but overall improved to 120s  Had one time 197 this AM  Pt is awake  Able to answer yes and no       OBJECTIVE:  Current Weight: Weight - Scale: 42 2 kg (93 lb 0 6 oz)  Vitals:    07/06/20 2223 07/07/20 0442 07/07/20 0543 07/07/20 0705   BP: 127/77 (!) 197/89  114/50   Pulse: 63 83  64   Resp: 20      Temp: 97 6 °F (36 4 °C)   (!) 96 5 °F (35 8 °C)   TempSrc:       SpO2: 98% 95%  98%   Weight:   42 2 kg (93 lb 0 6 oz)    Height:           Intake/Output Summary (Last 24 hours) at 7/7/2020 1019  Last data filed at 7/7/2020 3587  Gross per 24 hour   Intake 600 ml   Output 1007 ml   Net -407 ml     General: NAD, chronically ill appearing  Skin: no rash  Eyes: anicteric sclera  ENT: dry mucous membrane  Neck: supple  Chest: CTA b/l, no ronchii, no wheeze, no rubs, no rales  CVS: s1s2, no murmur, no gallop, no Renal Consult Note    Patient :  Poppy Whitfield; 62 y.o. MRN# [id number]  Location:  18/18  Attending:  Edmond Wise MD  Admit Date:  5/31/2022   Hospital Day: 0    Reason for Consult:     Asked by Dr Edmond Wise MD to see for WALLY/Elevated Creatinine. History Obtained From:     patient, electronic medical record    HD Access:     previous permacath in right femoral vein- placed on 2/28/2022    [facility]    Nephrologist:     Dr. César Kebede    Dry Weight:     109 kg    Admission Weight:     108.9 kg    History of Present Illness: Poppy Whitfield; 62 y.o. male with past medical history as mentioned below presented to the hospital with the chief complaint of pain in the anal region since yesterday. States that the pain started spontaneously and is constant since yesterday, progressively worsening. Associated with generalized weakness. Denies any trauma, fever, chills, diaphoresis, shortness of breath, chest pain, dizziness, lightheadedness, abdominal pain. Denies any similar previous complaints. On arrival to ER, patient was alert and oriented x4. Drowsy. Hemodynamically stable. Significant labs:  Lactic acid 3.0, proBNP 1685, troponins 122 > 118. Potassium 4.3, bicarb 25, BUN 34. WBC 17.5, Hb 9.9, MCV 95.    CT abdomen pelvis with contrast demonstrated 2.2 cm x 1.7 cm x 3.5 cm perianal abscess with mid to lower rectum wall thickening likely from proctitis. Inferior mesenteric and bilateral mesorectal lymph nodes prominent. Concern for necrotizing fasciitis. Malignancy needs to be ruled out. Past History/Allergies? Social History:     Past Medical History:   Diagnosis Date    A-V fistula ([facility]     lt arm    AICD (automatic cardioverter/defibrillator) present 2006    Asthma     Atrial fibrillation ([facility]     Blood transfusion reaction     CAD (coronary artery disease)     [facility] /clear[facility] 4/15    CHF (congestive heart failure) ([facility]     CHF (congestive heart failure) (HCC)     COPD (chronic obstructive pulmonary disease) (HCC)     CRF (chronic renal failure)     Diabetes mellitus ([facility])     IDDM    Dialysis patient ([facility]     GERD (gastroesophageal reflux disease)     Hemodialysis patient ([facility]     laskey tues-thurs-sat    Hypertension     treated w/ meds    Obesity        Allergies   Allergen Reactions    Spironolactone Itching and Other (See Comments)       Social History     Socioeconomic History    Marital status: Legally      Spouse name: Not on file    Number of children: Not on file    Years of education: Not on file    Highest education level: Not on file   Occupational History    Not on file   Tobacco Use    Smoking status: Never Smoker    Smokeless tobacco: Never Used   Substance and Sexual Activity    Alcohol use: No    Drug use: No    Sexual activity: Not on file   Other Topics Concern    Not on file   Social History Narrative    Not on file     Social Determinants of Health     Financial Resource Strain:     Difficulty of Paying Living Expenses: Not on file   Food Insecurity:     Worried About Running Out of Food in the Last Year: Not on file    Hali of Food in the Last Year: Not on file   Transportation Needs:     Lack of Transportation (Medical): Not on file    Lack of Transportation (Non-Medical):  Not on file   Physical Activity:     Days of Exercise per Week: Not on file    Minutes of Exercise per Session: Not on file   Stress:     Feeling of Stress : Not on file   Social Connections:     Frequency of Communication with Friends and Family: Not on file    Frequency of Social Gatherings with Friends and Family: Not on file    Attends Mosque Services: Not on file    Active Member of Clubs or Organizations: Not on file    Attends Club or Organization Meetings: Not on file    Marital Status: Not on file   Intimate Partner Violence:     Fear of Current or Ex-Partner: Not on file   Viktoria rub  Abdomen: soft, nontender, nl sounds  Extremities: no edema LE b/l  : no jerome  Neuro: AAOX1-2  Psych: normal affect    Medications:    Current Facility-Administered Medications:     acetaminophen (TYLENOL) tablet 650 mg, 650 mg, Oral, Q6H PRN, Deng Zaman PA-C    amLODIPine (NORVASC) tablet 2 5 mg, 2 5 mg, Oral, Daily, Jazlyn Paniagua MD    b complex-vitamin C-folic acid (NEPHROCAPS) capsule 1 capsule, 1 capsule, Oral, Daily With Terry Suggs PA-C, 1 capsule at 07/05/20 1518    calcium carbonate (TUMS) chewable tablet 1,000 mg, 1,000 mg, Oral, Daily PRN, Delia Zaman PA-C    cholestyramine sugar free (QUESTRAN LIGHT) packet 4 g, 4 g, Oral, BID, Deng Zaman PA-C    folic acid (FOLVITE) tablet 1 mg, 1 mg, Oral, Daily, Deng Zaman PA-C, 1 mg at 07/05/20 1518    heparin (porcine) subcutaneous injection 5,000 Units, 5,000 Units, Subcutaneous, Q8H [address], 5,000 Units at 07/07/20 0505 **AND** [CANCELED] Platelet count, , , Once, Deng Zaman PA-C    hydroxychloroquine (PLAQUENIL) tablet 200 mg, 200 mg, Oral, Daily With Breakfast, Deng Zaman PA-C    levETIRAcetam (KEPPRA) 500 mg in sodium chloride 0 9 % 100 mL IVPB, 500 mg, Intravenous, Once per day on Sun Tue Thu Sat, Deng Zaman PA-C, Stopped at 07/07/20 6014    levETIRAcetam (KEPPRA) 750 mg in sodium chloride 0 9 % 100 mL IVPB, 750 mg, Intravenous, Once per day on Mon Wed Fri, Jazlyn Paniagua MD, Last Rate: 400 mL/hr at 07/06/20 0935, 750 mg at 07/06/20 0935    lidocaine (LIDODERM) 5 % patch 1 patch, 1 patch, Topical, Daily, Lindsey Flanagan DO, 1 patch at 07/07/20 0851    metoprolol (LOPRESSOR) injection 5 mg, 5 mg, Intravenous, Q6H, Deng Zaman PA-C, 5 mg at 07/07/20 0505    ondansetron (ZOFRAN) injection 4 mg, 4 mg, Intravenous, Q6H PRN, Deng Zaman PA-C    pantoprazole (PROTONIX) EC tablet 40 mg, 40 mg, Oral, Daily, Deng Zaman PA-C, 40 mg at 07/05/20 1518    senna (SENOKOT) tablet 8 6 mg, 1 tablet, Oral, Daily, Deng Zaman PA-C, 8 6 mg at 07/05/20 1517    traZODone (DESYREL) tablet 50 mg, 50 mg, Oral, HS, Deng Zaman PA-C, 50 mg at 07/06/20 2320    Laboratory Results:  Results from last 7 days   Lab Units 07/07/20  0446 07/06/20  0603 07/05/20  0911   WBC Thousand/uL  --  3 20* 2 79*   HEMOGLOBIN g/dL  --  12 3 10 9*   HEMATOCRIT %  --  39 2 34 4*   PLATELETS Thousands/uL  --  88* 77*   POTASSIUM mmol/L 4 2 4 2 4 2   CHLORIDE mmol/L 103 102 102   CO2 mmol/L 25 24 29   BUN mg/dL 17 40* 37*   CREATININE mg/dL 1 41* 2 55* 2 59*   CALCIUM mg/dL 9 1 9 4 9 5   MAGNESIUM mg/dL  --  2 2  -- Emotionally Abused: Not on file    Physically Abused: Not on file    Sexually Abused: Not on file   Housing Stability:     Unable to Pay for Housing in the Last Year: Not on file    Number of Places Lived in the Last Year: Not on file    Unstable Housing in the Last Year: Not on file       Family History:        Family History   Adopted: Yes       Outpatient Medications:     Not in a hospital admission. Current Medications:     Scheduled Meds:    clindamycin (CLEOCIN) IV  900 mg IntraVENous Once    vancomycin  2,000 mg IntraVENous Once     Continuous Infusions:   PRN Meds:      Review of Systems:     Constitutional: No fever, no chills, no lethargy, no weakness. HEENT:  No headache, otalgia, itchy eyes, nasal discharge or sore throat. Cardiac:  No chest pain, dyspnea, orthopnea or PND. Chest:  No cough, phlegm or wheezing. Abdomen:  No abdominal pain, nausea or vomiting. Neuro:  No focal weakness, abnormal movements orseizure like activity. Skin:   No rashes, no itching. :   No hematuria, no pyuria, no dysuria, no flank pain. Extremities:  No swelling or joint pains. ROS was otherwise negative except as mentioned in the [address] Input/Output:     No intake/output data recorded. Vital Signs:   Temperature:  Temp: 99.8 °F (37.7 °C)  TMax:   Temp (24hrs), Av.2 °F (37.9 °C), Min:99.8 °F (37.7 °C), Max:100.6 °F (38.1 °C)    Respirations:  Resp: 14  Pulse:   Heart Rate: (!) 102  BP:    BP: 133/65  BP Range: Systolic (44XGU), NRH:908 , Min:114 , XNF:669       Diastolic (87HIZ), YPM:38, Min:39, Max:68      Physical Examination:     General:  AAO x 3, speaking in full sentences, no accessory muscle use. Appears tired. HEENT: Atraumatic, normocephalic, no throat congestion, moist mucosa. Eyes:   Pupils equal, round and reactive to light, EOMI. Neck:   No JVD, no thyromegaly, no lymphadenopathy. Chest:  Bilateral vesicular breath sounds, no rales or wheezes.   Cardiac:  S1 S2 RR, no murmurs, gallops or rubs, JVP not raised. Abdomen: Soft, non-tender, no masses or organomegaly, BS audible. :   No suprapubic or flank tenderness. Rectal examination not performed. Neuro:  AAO x 3, No FND. SKIN:  No rashes, good skin turgor. Extremities:  No edema, palpable peripheral pulses, no calf tenderness. Labs:       Recent Labs     05/31/22  0537   WBC 17.5*   RBC 3.30*   HGB 9.9*   HCT 31.5*   MCV 95.5   MCH 30.0   MCHC 31.4   RDW 14.9*      MPV 10.9      BMP:   Recent Labs     05/31/22  0537      K 4.3   CL 91*   CO2 25   BUN 34*   CREATININE 12.28*   GLUCOSE 69*   CALCIUM 9.4      Phosphorus:   No results for input(s): PHOS in the last 72 hours. Magnesium:  No results for input(s): MG in the last 72 hours. Albumin:    Recent Labs     05/31/22  0537   LABALBU 3.7     BNP:      Lab Results   Component Value Date    BNP NOT REPORTED 08/21/2014     PTH:   No results found for: IPTH  Blood cultures:  No results found for: BC    Urinalysis/Chemistries:    No results found for: Zeina Gitelman, PHUR, LABCAST, WBCUA, RBCUA, MUCUS, TRICHOMONAS, YEAST, BACTERIA, CLARITYU, SPECGRAV, LEUKOCYTESUR, UROBILINOGEN, BILIRUBINUR, BLOODU, GLUCOSEU, [address], AMORPHOUS    Radiology:     CXR:     Assessment:     1. ESRD secondary to diabetic nephrosclerosis on Hemodialysis. His regular HD days are Tuesday, Thursday and Saturday at [facility] hemod[facility] using right femoral tunneled catheter under Dr. Anya Sweet. His dry weight is 109 kg. Admitted with 108.9 kg.  2.  Perianal abscess, proctitis with regional lymphadenopathy. Malignancy to be ruled out. 3.  Anemia of chronic disease  4. Diabetes mellitus type 2  5. Essential hypertension  6. Coronary artery disease with ischemic cardiomyopathy (EF 40 to 45%) s/p AICD  7. Paroxysmal atrial fibrillation    Plan:   1. Hemodialysis today. 2.  Gadolinium contrast should be avoided due to ESRD.   If at all MRI with contrast is needed, please check with nephrology. 3.  Strict Input and Output, Daily weigh and document in the chart. 4.  Fluid and potassium restriction diet. Nutrition   Please ensure that patient is on a renal diet/TF. Thank you for the consultation. Please do not hesitate to contact us for any further questions/concerns. We will continue to follow along with you. Deni Childs MD  Internal Medicine Resident, PGY-3  [address] [place], [place]  5/31/2022, 8:58 AM     Attending Physician Statement  I have discussed the care of Binh Slaughter, including pertinent history and exam findings with the resident/fellow. I have reviewed the key elements of all parts of the encounter with the resident/fellow. I have seen and examined the patient with the resident/fellow. I agree with the assessment and plan and status of the problem list as documented. Addiitionally I recommend there are issues with malfunction of the right femoral dialysis catheter. We will put tPA in the catheter to hopefully help improve catheter flow. The patient has a left upper arm AV graft that is not yet ready for use. If the catheter cannot be used appropriately, it will need to be exchanged likely over a guidewire by IR for a new dialysis catheter.     Jazmyn Park MD   Nephrology Attending Physician  [facility]  5/31/2022

## 2022-05-31 NOTE — ED NOTES
The following labs labeled with pt sticker and tubed to lab:     [] Blue     [] Lavender   [] on ice  [x] Green/yellow  [x] Green/black [] on ice  [] Yellow  [] Red  [] Pink      [] COVID-19 swab    [] Rapid  [] PCR  [] Flu swab  [] Peds Viral Panel     [] Urine Sample  [] Pelvic Cultures  [] Blood Cultures            Tawana Zayas RN  05/31/22 0769

## 2022-05-31 NOTE — ED NOTES
Pt arrived with complaints of generalized weakness. Pt stated that he felt this way after his dialysis. Pt stated that he got dialysis on Saturday and that he gets dialysis 3 times a week. Pt placed on full cardiac monitor. Call light within reach.  Will continue plan of care      Mary Barnett RN  05/31/22 5056

## 2022-06-01 LAB
ABSOLUTE EOS #: 0.58 K/UL (ref 0–0.4)
ABSOLUTE IMMATURE GRANULOCYTE: 0 K/UL (ref 0–0.3)
ABSOLUTE LYMPH #: 2.18 K/UL (ref 1–4.8)
ABSOLUTE MONO #: 1.74 K/UL (ref 0.1–0.8)
ANION GAP SERPL CALCULATED.3IONS-SCNC: 15 MMOL/L (ref 9–17)
BASOPHILS # BLD: 0 % (ref 0–2)
BASOPHILS ABSOLUTE: 0 K/UL (ref 0–0.2)
BUN BLDV-MCNC: 27 MG/DL (ref 8–23)
CALCIUM SERPL-MCNC: 9.2 MG/DL (ref 8.6–10.4)
CHLORIDE BLD-SCNC: 93 MMOL/L (ref 98–107)
CO2: 26 MMOL/L (ref 20–31)
CREAT SERPL-MCNC: 9.49 MG/DL (ref 0.7–1.2)
EKG ATRIAL RATE: 106 BPM
EKG P AXIS: 73 DEGREES
EKG P-R INTERVAL: 178 MS
EKG Q-T INTERVAL: 344 MS
EKG QRS DURATION: 96 MS
EKG QTC CALCULATION (BAZETT): 456 MS
EKG R AXIS: 16 DEGREES
EKG T AXIS: 58 DEGREES
EKG VENTRICULAR RATE: 106 BPM
EOSINOPHILS RELATIVE PERCENT: 4 % (ref 1–4)
GFR AFRICAN AMERICAN: 7 ML/MIN
GFR NON-AFRICAN AMERICAN: 6 ML/MIN
GFR SERPL CREATININE-BSD FRML MDRD: ABNORMAL ML/MIN/{1.73_M2}
GLUCOSE BLD-MCNC: 66 MG/DL (ref 70–99)
HCT VFR BLD CALC: 27.3 % (ref 40.7–50.3)
HEMOGLOBIN: 9 G/DL (ref 13–17)
IMMATURE GRANULOCYTES: 0 %
IRON SATURATION: 20 % (ref 20–55)
IRON: 30 UG/DL (ref 59–158)
LYMPHOCYTES # BLD: 15 % (ref 24–44)
MCH RBC QN AUTO: 30.2 PG (ref 25.2–33.5)
MCHC RBC AUTO-ENTMCNC: 33 G/DL (ref 28.4–34.8)
MCV RBC AUTO: 91.6 FL (ref 82.6–102.9)
MONOCYTES # BLD: 12 % (ref 1–7)
MORPHOLOGY: ABNORMAL
NRBC AUTOMATED: 0 PER 100 WBC
PDW BLD-RTO: 15.1 % (ref 11.8–14.4)
PLATELET # BLD: 183 K/UL (ref 138–453)
PMV BLD AUTO: 10.9 FL (ref 8.1–13.5)
POTASSIUM SERPL-SCNC: 4.5 MMOL/L (ref 3.7–5.3)
RBC # BLD: 2.98 M/UL (ref 4.21–5.77)
RBC # BLD: ABNORMAL 10*6/UL
SEG NEUTROPHILS: 69 % (ref 36–66)
SEGMENTED NEUTROPHILS ABSOLUTE COUNT: 10 K/UL (ref 1.8–7.7)
SODIUM BLD-SCNC: 134 MMOL/L (ref 135–144)
TOTAL IRON BINDING CAPACITY: 151 UG/DL (ref 250–450)
UNSATURATED IRON BINDING CAPACITY: 121 UG/DL (ref 112–347)
WBC # BLD: 14.5 K/UL (ref 3.5–11.3)

## 2022-06-01 PROCEDURE — 99232 SBSQ HOSP IP/OBS MODERATE 35: CPT | Performed by: INTERNAL MEDICINE

## 2022-06-01 PROCEDURE — 1200000000 HC SEMI PRIVATE

## 2022-06-01 PROCEDURE — 36415 COLL VENOUS BLD VENIPUNCTURE: CPT

## 2022-06-01 PROCEDURE — 2500000003 HC RX 250 WO HCPCS: Performed by: STUDENT IN AN ORGANIZED HEALTH CARE EDUCATION/TRAINING PROGRAM

## 2022-06-01 PROCEDURE — 97162 PT EVAL MOD COMPLEX 30 MIN: CPT

## 2022-06-01 PROCEDURE — 80048 BASIC METABOLIC PNL TOTAL CA: CPT

## 2022-06-01 PROCEDURE — 94761 N-INVAS EAR/PLS OXIMETRY MLT: CPT

## 2022-06-01 PROCEDURE — 6370000000 HC RX 637 (ALT 250 FOR IP): Performed by: STUDENT IN AN ORGANIZED HEALTH CARE EDUCATION/TRAINING PROGRAM

## 2022-06-01 PROCEDURE — 99233 SBSQ HOSP IP/OBS HIGH 50: CPT | Performed by: INTERNAL MEDICINE

## 2022-06-01 PROCEDURE — 85025 COMPLETE CBC W/AUTO DIFF WBC: CPT

## 2022-06-01 PROCEDURE — 6360000002 HC RX W HCPCS: Performed by: STUDENT IN AN ORGANIZED HEALTH CARE EDUCATION/TRAINING PROGRAM

## 2022-06-01 PROCEDURE — 6370000000 HC RX 637 (ALT 250 FOR IP): Performed by: INTERNAL MEDICINE

## 2022-06-01 PROCEDURE — 2580000003 HC RX 258: Performed by: STUDENT IN AN ORGANIZED HEALTH CARE EDUCATION/TRAINING PROGRAM

## 2022-06-01 PROCEDURE — 83550 IRON BINDING TEST: CPT

## 2022-06-01 PROCEDURE — 93010 ELECTROCARDIOGRAM REPORT: CPT | Performed by: INTERNAL MEDICINE

## 2022-06-01 PROCEDURE — 97116 GAIT TRAINING THERAPY: CPT

## 2022-06-01 PROCEDURE — 83540 ASSAY OF IRON: CPT

## 2022-06-01 RX ORDER — METRONIDAZOLE 500 MG/1
500 TABLET ORAL EVERY 8 HOURS SCHEDULED
Qty: 42 TABLET | Refills: 0 | Status: SHIPPED | OUTPATIENT
Start: 2022-06-02 | End: 2022-06-16

## 2022-06-01 RX ORDER — LOSARTAN POTASSIUM 50 MG/1
50 TABLET ORAL DAILY
Status: DISCONTINUED | OUTPATIENT
Start: 2022-06-01 | End: 2022-06-02 | Stop reason: HOSPADM

## 2022-06-01 RX ADMIN — LANTHANUM CARBONATE 1000 MG: 500 TABLET, CHEWABLE ORAL at 13:12

## 2022-06-01 RX ADMIN — METRONIDAZOLE 500 MG: 500 TABLET, FILM COATED ORAL at 06:33

## 2022-06-01 RX ADMIN — CEFEPIME HYDROCHLORIDE 1000 MG: 1 INJECTION, POWDER, FOR SOLUTION INTRAMUSCULAR; INTRAVENOUS at 17:21

## 2022-06-01 RX ADMIN — LANTHANUM CARBONATE 1000 MG: 500 TABLET, CHEWABLE ORAL at 17:21

## 2022-06-01 RX ADMIN — HEPARIN SODIUM 5000 UNITS: 5000 INJECTION INTRAVENOUS; SUBCUTANEOUS at 10:03

## 2022-06-01 RX ADMIN — SODIUM CHLORIDE, PRESERVATIVE FREE 10 ML: 5 INJECTION INTRAVENOUS at 10:17

## 2022-06-01 RX ADMIN — APIXABAN 5 MG: 5 TABLET, FILM COATED ORAL at 17:21

## 2022-06-01 RX ADMIN — SODIUM CHLORIDE, PRESERVATIVE FREE 10 ML: 5 INJECTION INTRAVENOUS at 21:00

## 2022-06-01 RX ADMIN — FAMOTIDINE 20 MG: 20 TABLET, FILM COATED ORAL at 10:00

## 2022-06-01 RX ADMIN — NEPHROCAP 1 MG: 1 CAP ORAL at 10:00

## 2022-06-01 RX ADMIN — GABAPENTIN 300 MG: 300 CAPSULE ORAL at 21:00

## 2022-06-01 RX ADMIN — METOPROLOL SUCCINATE 50 MG: 50 TABLET, FILM COATED, EXTENDED RELEASE ORAL at 10:03

## 2022-06-01 RX ADMIN — METHOCARBAMOL TABLETS 750 MG: 500 TABLET, COATED ORAL at 10:01

## 2022-06-01 RX ADMIN — METHOCARBAMOL TABLETS 750 MG: 500 TABLET, COATED ORAL at 13:11

## 2022-06-01 RX ADMIN — METHOCARBAMOL TABLETS 750 MG: 500 TABLET, COATED ORAL at 21:00

## 2022-06-01 RX ADMIN — ASPIRIN 81 MG: 81 TABLET, CHEWABLE ORAL at 10:00

## 2022-06-01 RX ADMIN — METRONIDAZOLE 500 MG: 500 TABLET, FILM COATED ORAL at 13:11

## 2022-06-01 RX ADMIN — METRONIDAZOLE 500 MG: 500 TABLET, FILM COATED ORAL at 21:01

## 2022-06-01 RX ADMIN — LANTHANUM CARBONATE 1000 MG: 500 TABLET, CHEWABLE ORAL at 10:01

## 2022-06-01 ASSESSMENT — PAIN DESCRIPTION - PAIN TYPE: TYPE: ACUTE PAIN

## 2022-06-01 ASSESSMENT — PAIN SCALES - GENERAL
PAINLEVEL_OUTOF10: 3
PAINLEVEL_OUTOF10: 0
PAINLEVEL_OUTOF10: 1
PAINLEVEL_OUTOF10: 2
PAINLEVEL_OUTOF10: 0

## 2022-06-01 ASSESSMENT — PAIN - FUNCTIONAL ASSESSMENT: PAIN_FUNCTIONAL_ASSESSMENT: ACTIVITIES ARE NOT PREVENTED

## 2022-06-01 ASSESSMENT — ENCOUNTER SYMPTOMS
COLOR CHANGE: 0
APNEA: 0
EYE DISCHARGE: 0
RECTAL PAIN: 1
CONSTIPATION: 1
ABDOMINAL DISTENTION: 0
EYE REDNESS: 0
EYE PAIN: 0

## 2022-06-01 ASSESSMENT — PAIN SCALES - WONG BAKER
WONGBAKER_NUMERICALRESPONSE: 0
WONGBAKER_NUMERICALRESPONSE: 2
WONGBAKER_NUMERICALRESPONSE: 0

## 2022-06-01 ASSESSMENT — PAIN DESCRIPTION - ORIENTATION: ORIENTATION: MID;POSTERIOR;UPPER

## 2022-06-01 ASSESSMENT — PAIN DESCRIPTION - FREQUENCY: FREQUENCY: CONTINUOUS

## 2022-06-01 ASSESSMENT — PAIN DESCRIPTION - LOCATION: LOCATION: COCCYX

## 2022-06-01 ASSESSMENT — PAIN DESCRIPTION - DESCRIPTORS: DESCRIPTORS: DISCOMFORT;SHOOTING

## 2022-06-01 NOTE — PROGRESS NOTES
General Surgery Progress Note            PATIENT NAME: Fadi Perry     TODAY'S DATE: 6/1/2022, 5:52 AM    Chief Complaint   Patient presents with    Fatigue     feeling tired after dialysis on Sat.  Other     rectum pain        SUBJECTIVE:    Pt seen and examined. He was sleeping comfortably in bed. Pt reports pain is improving. Pt had one bowel movement last night without blood. Pt denies CP, SOB, cough, fever, chills, abdominal pain, nausea or diarrhea. OBJECTIVE:   Vitals:  BP (!) 165/116   Pulse 95   Temp 98.4 °F (36.9 °C) (Oral)   Resp 17   Ht 6' 1\" (1.854 m)   Wt 267 lb 8 oz (121.3 kg)   SpO2 100%   BMI 35.29 kg/m²      INTAKE/OUTPUT:      Intake/Output Summary (Last 24 hours) at 6/1/2022 0552  Last data filed at 5/31/2022 2000  Gross per 24 hour   Intake 300 ml   Output 2100 ml   Net -1800 ml        CONSTITUTIONAL:  awake, alert, not distressed  HEENT: Normocephalic/atraumatic, without obvious abnormality  NECK:  Supple, symmetrical, trachea midline   CARDIOVASCULAR: Regular rate and rhythm  LUNGS: Mildly increased WOB  ABDOMEN: Obese, soft, nodistended   ANORECTAL: No external abnormalities noted, no wounds, no fluctuance or induration, on CHRISTIE there is tenderness to palpation circumferentially without palpable abnormality, no gross blood  MUSCULOSKELETAL: Muscle strength intact in all extremities bilaterally  NEUROLOGIC: CN II- XII intact.  Gross motor intact without focal weakness    Data Review:  CBC:   Recent Labs     05/31/22 0537   WBC 17.5*   HGB 9.9*        BMP:    Recent Labs     05/31/22 0537      K 4.3   CL 91*   CO2 25   BUN 34*   CREATININE 12.28*   GLUCOSE 69*     Hepatic:   Recent Labs     05/31/22 0537   AST 18   ALT 6   ALKPHOS 95   BILITOT 0.54     Coagulation:   Recent Labs     05/31/22 0537   APTT 27.7   PROT 7.7   INR 1.1             ASSESSMENT     Patient Active Problem List   Diagnosis    Biventricular CHF (congestive heart failure) (HCC) with icd chronic     Paroxysmal atrial fibrillation (HCC)    Controlled type 2 diabetes mellitus with chronic kidney disease on chronic dialysis, without long-term current use of insulin (HCC)    Pulmonary emboli (HCC) rt lower lobe in 8/15 with negative venous doppler and tt since august 14 - with therapeutic inr     Lactic acidosis    ESRD (end stage renal disease) on dialysis (HCC)    Leukocytosis    Tachycardia    S/P cholecystectomy    Cardiomyopathy ([address]    Anemia of chronic disease    Fever and chills    Fever of unknown origin    Streptococcal septicemia (HCC)    CAD (coronary artery disease)    Sepsis due to methicillin susceptible Staphylococcus aureus (MSSA) without acute organ dysfunction (HCC) related to dialysis catheter    Perianal abscess    Proctitis    Necrotizing fasciitis (HCC)    Hypoglycemia    Pulmonary hyperinflation    Maricarmen-rectal abscess    Complications, dialysis, catheter, mechanical ([address]          62year old male with complex medical history who presents with rectal pain and intermittent rectal bleeding. History and physical exam are not consistent with necrotizing fasciitis. Diagnostic imaging was reviewed carefully and reveals mesorectal lymphadenopathy and possible intramural rim-enhancing collection at the level of the upper rectum which could represent an abscess versus proctitis    PLAN  1. F/u AM labs  2. No plans for operative intervention at this time  3. Nephro consulted, dialysis TTS  4. Continue Cipro, Flagyl and Cefepime per Infectious Disease  5.  Will continue to monitor    Jonatan Morales DO PGY-1  6/1/22 6:07 AM

## 2022-06-01 NOTE — PROGRESS NOTES
Renal Progress Note    Patient :  Shon Almeida; 62 y.o. MRN# [id number]  Location:  6043/8688-46  Attending:  Earlene Galeano MD  Admit Date:  5/31/2022   Hospital Day: 1    Subjective:     Patient seen and examined bedside. Labs and chart reviewed. No acute overnight events. Hemodynamically stable. Afebrile. Patient is awake alert and oriented x3. No apparent distress. States that the perianal pain is much improved. Denies any other complaints at this time. ID following for perianal abscess. Currently on IV cefepime, Flagyl, vancomycin. General surgery has no acute plans. Underwent dialysis yesterday via right femoral tunneled cath. Required 1 hour tPA intracatheter before the line could be used. 1.8 L net removal.  Tolerated well. Brief history:  58-year-old male with past medical history of ESRD on dialysis TTS schedule, diabetes mellitus type 2, hypertension, CAD with ischemic cardiomyopathy status post AICD, paroxysmal atrial fibrillation presented to the ER with chief complaint of pain in the anal region for 1 day. Pain started spontaneously and is constant, progressively worsening. Associated with generalized weakness. Denies any trauma, fever, chills, diaphoresis, shortness of breath, chest pain, dizziness, lightheadedness, abdominal pain. Denies any previous similar complaints. WBC 17.5, Hb 9.9, lactic acid 3.0. CT abdomen pelvis with contrast demonstrated 2.2 cm x 1.7 cm x 3.5 cm perianal abscess with mid to lower rectum wall thickening likely from proctitis. Inferior mesenteric and bilateral mesorectal lymph nodes prominent. Concern for necrotizing fasciitis. Malignancy needs to be ruled out. Outpatient Medications:     Medications Prior to Admission: amLODIPine (NORVASC) 5 MG tablet, Take 1 tablet by mouth daily  B Complex-C-Folic Acid (LOLA-GRACE) TABS, TAKE 1 TABLET BY MOUTH EVERY EVENING  gabapentin (NEURONTIN) 300 MG capsule, Take 300 mg by mouth daily as needed. Methoxy PEG-Epoetin Beta (MIRCERA) 30 MCG/0.3ML SOSY, Inject 30 mcg as directed every 14 days Patient takes this with dialysis every 2 weeks  Cholecalciferol (VITAMIN D3) 1.25 MG (77504 UT) CAPS, Take 1.25 capsules by mouth See Admin Instructions Patient takes this medication 3 times a week with dialysis  cinacalcet (SENSIPAR) 60 MG tablet, 60 mg every Tuesday Thursday and Saturday with dialysis (Patient taking differently: Take 60 mg by mouth three times a week 60 mg every Tuesday Thursday and Saturday with dialysis)  methocarbamol (ROBAXIN) 750 MG tablet, Take 750 mg by mouth 3 times daily as needed   lanthanum (FOSRENOL) 1000 MG chewable tablet, Take 1,000 mg by mouth 3 times daily (with meals)   metoprolol succinate (TOPROL XL) 50 MG extended release tablet, Take 50 mg by mouth daily   aspirin 81 MG chewable tablet, Take 81 mg by mouth daily  midodrine (PROAMATINE) 10 MG tablet, Take 10 mg by mouth 2 times daily as needed (at dialysis as needed)     Current Medications:     Scheduled Meds:    [START ON 6/2/2022] vancomycin  1,750 mg IntraVENous Once per day on Tue Thu Sat    apixaban  5 mg Oral BID    losartan  50 mg Oral Daily    [Held by provider] amLODIPine  5 mg Oral Daily    aspirin  81 mg Oral Daily    b complex-C-folic acid  1 mg Oral Daily    cinacalcet  60 mg Oral Once per day on Tue Thu Sat    gabapentin  300 mg Oral Nightly    lanthanum  1,000 mg Oral TID WC    methocarbamol  750 mg Oral TID    epoetin que-epbx  5,000 Units IntraVENous Once per day on Tue Thu Sat    metoprolol succinate  50 mg Oral Daily    sodium chloride flush  5-40 mL IntraVENous 2 times per day    famotidine  20 mg Oral Daily    metroNIDAZOLE  500 mg Oral 3 times per day    cefepime  1,000 mg IntraVENous Q24H    vancomycin (VANCOCIN) intermittent dosing (placeholder)   Other RX Placeholder     Continuous Infusions:    sodium chloride       PRN Meds:  midodrine, sodium chloride flush, sodium chloride, acetaminophen **OR** acetaminophen, albuterol, sodium chloride, sodium chloride, heparin (porcine), heparin (porcine)    Input/Output:       I/O last 3 completed shifts: In: 300   Out: 2100     Vital Signs:   Temperature:  Temp: 97.3 °F (36.3 °C)  TMax:   Temp (24hrs), Av.1 °F (36.7 °C), Min:97.3 °F (36.3 °C), Max:98.9 °F (37.2 °C)    Respirations:  Resp: 14  Pulse:   Heart Rate: 88  BP:    BP: (!) 122/50  BP Range: Systolic (67TUZ), QPD:932 , Min:105 , KPT:607       Diastolic (57IFW), KRISHNA:87, Min:50, Max:116      Physical Examination:     General:          AAO x 3, speaking in full sentences, no accessory muscle use. HEENT:           Atraumatic, normocephalic, no throat congestion, moist mucosa. Eyes:               Pupils equal, round and reactive to light, EOMI. Neck:               No JVD, no thyromegaly, no lymphadenopathy. Chest:  Bilateral vesicular breath sounds, no rales or wheezes. Cardiac:           S1 S2 RR, no murmurs, gallops or rubs, JVP not raised. Abdomen:        Soft, non-tender, no masses or organomegaly, BS audible. :                  No suprapubic or flank tenderness. Rectal examination not performed. Neuro:             AAO x 3, No FND. SKIN:               No rashes, good skin turgor. Extremities:     No edema. Labs:       Recent Labs     2237 22  0538   WBC 17.5* 14.5*   RBC 3.30* 2.98*   HGB 9.9* 9.0*   HCT 31.5* 27.3*   MCV 95.5 91.6   MCH 30.0 30.2   MCHC 31.4 33.0   RDW 14.9* 15.1*    183   MPV 10.9 10.9      BMP:   Recent Labs     22  0537 22  0538    134*   K 4.3 4.5   CL 91* 93*   CO2 25 26   BUN 34* 27*   CREATININE 12.28* 9.49*   GLUCOSE 69* 66*   CALCIUM 9.4 9.2     Albumin:    Recent Labs     22  0537   LABALBU 3.7     BNP:      Lab Results   Component Value Date    BNP NOT REPORTED 2014     SPEP:  Lab Results   Component Value Date    PROT 7.7 2022     Assessment:     1.  ESRD secondary to diabetic nephrosclerosis on Hemodialysis. His regular HD days are Tuesday, Thursday and Saturday at [facility] hemodialysis facility using right femoral tunneled catheter under Dr. Jessenia Pitts. His dry weight is 123.5 kg.   2.    Perirectal abscess, proctitis with regional lymphadenopathy. Malignancy to be ruled out. 3.  Anemia of chronic disease  4. Diabetes mellitus type 2  5. Essential hypertension  6. Coronary artery disease with ischemic cardiomyopathy (EF 40 to 45%) s/p AICD  7. Paroxysmal atrial fibrillation    Plan:   1. Hemodialysis tomorrow per schedule. No acute need for dialysis today. 2.  Strict inputs and outputs. Daily weights and document in the chart. 3.  Fluid restriction with low phosphate and low potassium diet. 4.  Continue antibiotics per ID with renal dosing. 5.  Okay to be discharged from nephrology standpoint. 6.  Will follow with you. Nutrition   Please ensure that patient is on a renal diet/TF. Avoid nephrotoxic drugs/contrast exposure. We will continue to follow along with you. Mary Ann Peres MD  Internal Medicine Resident, PGY-3  [address], [place]  6/1/2022, 1:16 PM    Attending Physician Statement  I have discussed the care of this patient, including pertinent history and exam findings, with the Resident/CNP. I have reviewed and edited the key elements of all parts of the encounter with the Resident/CNP. I agree with the assessment, plan and orders as documented by the Resident/CNP. Vineet Higuera MD   Nephrology [address] [place] [address] Drive    This note is created with the assistance of a speech-recognition program. While intending to generate a document that actually reflects the content of the visit, no guarantees can be provided that every mistake has been identified and corrected by editing.

## 2022-06-01 NOTE — PROGRESS NOTES
Dialysis Post Treatment Note  Vitals:    05/31/22 2026   BP: (!) 165/116   Pulse: 95   Resp:    Temp: 98.4 °F (36.9 °C)   SpO2: 100%     Pre-Weight = 124.4 kg  Post-weight = Weight: 272 lb 11.3 oz (123.7 kg)  Total Liters Processed = Total Liters Processed (l/min): 69.7 l/min  Rinseback Volume (mL) = Rinseback Volume (ml): 300 ml  Net Removal (mL) =  1800  Patient's dry weight= 123.5 kg  Type of access used= Fem cath  Length of treatment=225 min      Pt tolerated tx well. Required 1hour cathflow tx before line could be used. Pt clotted machine with 15 min left. Floor nurse was handed meds that ED left with Pt as well as brief report. Pt had recived vanco prior to tx.

## 2022-06-01 NOTE — PROGRESS NOTES
[facility] -   Infectious diseases evaluation  admission date 5/31/2022    reason for consultation:   fascitis     Impression :   Current:  · Perianal abscess   · proctitis and satellite LN enlargement  · bandemia  · DM2, COPD, dialysis ESRD/ chest HERO graft, AICD, CHF  ·   Discussion / summary of stay / plan of care   ·   Recommendations   · Agree with cefepime and vancomycin with dialysis  · po Flagyl   · Await surg plans > no plan for intervention   · Nephrology following for dialysis     Infection Control Recommendations   · Sasabe Precautions  · Contact Isolation       Antimicrobial Stewardship Recommendations   · Simplification of therapy  · Targeted therapy  History of Present Illness:   Initial history:  Mohsen Haro is a 62y.o.-year-old male with a past history of diabetes and ESRD on dialysis, comes in with rectal pain, ongoing for about a week. Difficulty having bowel movements,  Last year had a colonoscopy at [facility] and was told he had polyps  No fever or chills    CT scan of the abdomen showed a rectal abscess with fasciitis and could not tell if there is any communication with the fistula-there was a proctitis as well any concern of rectal tumor. White count is elevated, started on antibiotics, infectious consulted    Interval changes  6/1/2022   Patient Vitals for the past 8 hrs:   BP Temp Temp src Pulse Resp SpO2   06/01/22 0830 105/63 98.9 °F (37.2 °C) Oral 87 12 94 %   No fever and still perianal discomfort  Purulence from the carly anal area  abd soft   GS not planning for any interv so far  WBC14  All BC neg      Summary of relevant labs:  Labs:  CREATININE 12.28 High Panic    [id number]   CHA38.1 High  -14    Micro:  BC  Imaging:  CXR  Impression:  Left-sided HERO catheter with the tip over the proximal right atrium. No   pneumothorax is identified.      The elevated right hemidiaphragm is redemonstrated likely with some basilar atelectasis/scarring. The remaining lungs do not have acute process. Stent over the right subclavian region is stable with narrowing of its   midportion. CTAP  Impression:  1. Approximally 2.2 cm x 1.7 cm x 3.5 cm perianal abscess as above. Consider   further evaluation with MRI if there is concern for an associated sinus tract   or fistula. 2. Irregular wall thickening in the mid to lower rectum with adjacent   perirectal stranding potentially due to proctitis. However, malignancy could   appear similar in should be considered given the presence of inferior   mesenteric and bilateral mesorectal lymph nodes with increased prominence   that could be metastatic or reactive. Consider endoscopy. 3. Suspicion for mild cellulitis in the medial buttocks near the anus. Findings of necrotizing fasciitis. 4. Potential for cystitis. 5. Borderline atrophic kidneys with numerous simple appearing cysts,   potentially sequelae of chronic hemodialysis. The cysts are incompletely   evaluated with complicated cysts and solid neoplasms not excluded. Recommend   further evaluation with renal protocol abdomen MRI (preferred) or CT on a   nonemergent basis. I have personally reviewed the past medical history, past surgical history, medications, social history, and family history, and I haveupdated the database accordingly. Allergies:   Spironolactone     Review of Systems:     Review of Systems   Constitutional: Negative for activity change. HENT: Negative for congestion. Eyes: Negative for pain, discharge and redness. Respiratory: Negative for apnea. Cardiovascular: Negative for chest pain. Gastrointestinal: Positive for constipation and rectal pain. Negative for abdominal distention. Endocrine: Negative for cold intolerance. Genitourinary: Negative for dysuria. Musculoskeletal: Negative for arthralgias. Skin: Negative for color change.    Allergic/Immunologic: Negative for food allergies. Neurological: Negative for dizziness. Hematological: Negative for adenopathy. Psychiatric/Behavioral: Negative for agitation. Physical Examination :       Physical Exam  Constitutional:       Appearance: Normal appearance. He is not ill-appearing or diaphoretic. HENT:      Head: Normocephalic and atraumatic. Nose: Nose normal.      Mouth/Throat:      Mouth: Mucous membranes are moist.   Eyes:      General: No scleral icterus. Pupils: Pupils are equal, round, and reactive to light. Cardiovascular:      Rate and Rhythm: Normal rate and regular rhythm. Heart sounds: Normal heart sounds. No murmur heard. Pulmonary:      Effort: No respiratory distress. Breath sounds: Normal breath sounds. Abdominal:      General: There is no distension. Palpations: Abdomen is soft. Tenderness: There is no abdominal tenderness. Genitourinary:     Comments: No lozano  Musculoskeletal:         General: No swelling or deformity. Cervical back: Neck supple. No rigidity or tenderness. Skin:     General: Skin is dry. Findings: Lesion present. Neurological:      General: No focal deficit present. Mental Status: He is alert. Mental status is at baseline. Psychiatric:         Mood and Affect: Mood normal.         Thought Content:  Thought content normal.         Past Medical History:     Past Medical History:   Diagnosis Date    A-V fistula ([address]     lt arm    AICD (automatic cardioverter/defibrillator) present 2006    Asthma     Atrial fibrillation ([address]     Blood transfusion reaction     CAD (coronary artery disease)     dr Mohsen Velasquez cardiologist recently aicd check /clearance 4/15    CHF (congestive heart failure) ([address]     CHF (congestive heart failure) ([address]     COPD (chronic obstructive pulmonary disease) ([address]     CRF (chronic renal failure)     Diabetes mellitus ([address]     IDDM    Dialysis patient ([address]     GERD (gastroesophageal reflux disease)  [facility]     amanda tues-thurs-sat    Hypertension     treated w/ meds    Obesity        Past Surgical  History:     Past Surgical History:   Procedure Laterality Date    CARDIAC DEFIBRILLATOR PLACEMENT  2006    IR TUNNELED CATHETER PLACEMENT GREATER THAN 5 YEARS  2/28/2022    IR TUNNELED CATHETER PLACEMENT GREATER THAN 5 YEARS 2/28/2022 [facility]    PACEMAKER PLACEMENT      VASCULAR SURGERY      L avf       Medications:      [START ON 6/2/2022] vancomycin  1,750 mg IntraVENous Once per day on Tue Thu Sat    [Held by provider] amLODIPine  5 mg Oral Daily    aspirin  81 mg Oral Daily    b complex-C-folic acid  1 mg Oral Daily    cinacalcet  60 mg Oral Once per day on Tue Thu Sat    gabapentin  300 mg Oral Nightly    lanthanum  1,000 mg Oral TID WC    methocarbamol  750 mg Oral TID    epoetin que-epbx  5,000 Units IntraVENous Once per day on Tue Thu Sat    metoprolol succinate  50 mg Oral Daily    sodium chloride flush  5-40 mL IntraVENous 2 times per day    heparin (porcine)  5,000 Units SubCUTAneous TID    famotidine  20 mg Oral Daily    metroNIDAZOLE  500 mg Oral 3 times per day    cefepime  1,000 mg IntraVENous Q24H    vancomycin (VANCOCIN) intermittent dosing (placeholder)   Other RX Placeholder       Social History:     Social History     Socioeconomic History    Marital status: Legally      Spouse name: Not on file    Number of children: Not on file    Years of education: Not on file    Highest education level: Not on file   Occupational History    Not on file   Tobacco Use    Smoking status: Never Smoker    Smokeless tobacco: Never Used   Substance and Sexual Activity    Alcohol use: No    Drug use: No    Sexual activity: Not on file   Other Topics Concern    Not on file   Social History Narrative    Not on file     Social Determinants of Health     Financial Resource Strain:     Difficulty of Paying Living Expenses: Not on file Food Insecurity:     Worried About Running Out of Food in the Last Year: Not on file    Hali of Food in the Last Year: Not on file   Transportation Needs:     Lack of Transportation (Medical): Not on file    Lack of Transportation (Non-Medical): Not on file   Physical Activity:     Days of Exercise per Week: Not on file    Minutes of Exercise per Session: Not on file   Stress:     Feeling of Stress : Not on file   Social Connections:     Frequency of Communication with Friends and Family: Not on file    Frequency of Social Gatherings with Friends and Family: Not on file    Attends Buddhism Services: Not on file    Active Member of [address] [place] [address] [employer] or Organizations: Not on file    Attends Club or Organization Meetings: Not on file    Marital Status: Not on file   Intimate Partner Violence:     Fear of Current or Ex-Partner: Not on file    Emotionally Abused: Not on file    Physically Abused: Not on file    Sexually Abused: Not on file   Housing Stability:     Unable to Pay for Housing in the Last Year: Not on file    Number of [place] in the Last Year: Not on file    Unstable Housing in the Last Year: Not on file       Family History:     Family History   Adopted: Yes      Medical Decision Making:   I have independently reviewed/ordered the following labs:    CBC with Differential:   Recent Labs     05/31/22 0537 06/01/22  0538   WBC 17.5* 14.5*   HGB 9.9* 9.0*   HCT 31.5* 27.3*    183   LYMPHOPCT 6* 15*   MONOPCT 14* 12*     BMP:  Recent Labs     05/31/22  0537 06/01/22  0538    134*   K 4.3 4.5   CL 91* 93*   CO2 25 26   BUN 34* 27*   CREATININE 12.28* 9.49*     Hepatic Function Panel:   Recent Labs     05/31/22  0537   PROT 7.7   LABALBU 3.7   BILITOT 0.54   ALKPHOS 95   ALT 6   AST 18     No results for input(s): RPR in the last 72 hours. No results for input(s): HIV in the last 72 hours. No results for input(s): BC in the last 72 hours.   Lab Results   Component Value Date CREATININE 9.49 06/01/2022    GLUCOSE 66 06/01/2022       Detailed results: Thank you for allowing us to participate in the care of this patient. Please call with questions. This note is created with the assistance of a speech recognition program.  While intending to generate adocument that actually reflects the content of the visit, the document can still have some errors including those of syntax and sound a like substitutions which may escape proof reading. It such instances, actual meaningcan be extrapolated by contextual diversion. Eliu Reid MD  Office: [phone]  Perfect serve / office [phone]      I have discussed the care of the patient, including pertinent history and exam findings,  with the resident. I have seen and examined the patient and the key elements of all parts of the encounter have been performed by me. I agree with the assessment, plan and orders as documented by the resident.     Jeanie Ornelas, Infectious Diseases

## 2022-06-01 NOTE — PROGRESS NOTES
Physical Therapy  Facility/Department: [facility] ONC/MED SURG  Physical Therapy Initial Assessment    Name: Onofre Serna  : 1960  MRN: [id number]  Date of Service: 2022  Chief Complaint   Patient presents with    Fatigue     feeling tired after dialysis on Sat.  Other     rectum pain       Discharge Recommendations:  Patient would benefit from continued therapy after discharge   PT Equipment Recommendations  Equipment Needed: Yes  Mobility Devices: Betty Freshwater: Rollator (4 Wheeled)      Patient Diagnosis(es): The primary encounter diagnosis was Maricarmen-rectal abscess. A diagnosis of Septicemia ([address] was also pertinent to this visit. Past Medical History:  has a past medical history of A-V fistula ([address] AICD (automatic cardioverter/defibrillator) present, Asthma, Atrial fibrillation ([address] Blood transfusion reaction, CAD (coronary artery disease), CHF (congestive heart failure) ([address] CHF (congestive heart failure) ([address] COPD (chronic obstructive pulmonary disease) ([address] CRF (chronic renal failure), Diabetes mellitus ([address] Dialysis patient ([address] GERD (gastroesophageal reflux disease), Hemodialysis patient ([address] Hypertension, and Obesity. Past Surgical History:  has a past surgical history that includes vascular surgery; Cardiac defibrillator placement (); pacemaker placement; and IR TUNNELED CVC PLACE WO SQ PORT/PUMP > 5 YEARS (2022). Assessment   Body Structures, Functions, Activity Limitations Requiring Skilled Therapeutic Intervention: Decreased functional mobility ; Decreased strength;Decreased balance;Decreased endurance;Decreased safe awareness  Assessment: Pt with mobility deficits requiring CGA to ambulate 55 feet with a RW. Pt with high reliance on RW this date, would be unsafe to ambulate without an assistive device. Pt would benefit from additional therapy to address BLE strength, balance, and endurance deficits.   Pt would be unsafe to return to prior living arrangements based on today's session. Therapy Prognosis: Good  Decision Making: Medium Complexity  Requires PT Follow-Up: Yes  Activity Tolerance  Activity Tolerance: Patient limited by endurance; Patient limited by fatigue     Plan   Plan  Plan:  (5-6x/week)  Current Treatment Recommendations: Strengthening,Balance training,Functional mobility training,Transfer training,Stair training,Gait training,Endurance training,Equipment evaluation, education, & procurement,Therapeutic activities,Home exercise program,Safety education & training,Patient/Caregiver education & training  Safety Devices  Type of Devices: Gait belt,Left in chair,Call light within reach,Nurse notified,Patient at risk for falls  Restraints  Restraints Initially in Place: No     Restrictions  Restrictions/Precautions  Restrictions/Precautions: Up as Tolerated  Required Braces or Orthoses?: No     Subjective   General  Patient assessed for rehabilitation services?: Yes  Response To Previous Treatment: Not applicable  Family / Caregiver Present: No  Follows Commands: Within Functional Limits  Subjective  Subjective: Pt up in a chair and agreeable to therapy, RN agreeable to therapy. Pt cooperative throughout today's session.          Social/Functional History  Social/Functional History  Lives With: Family (daughter and daughter's children (age 6-11 YO))  Type of Home: House  Home Layout: One level,Laundry in basement  Home Access: Stairs to enter without rails  Entrance Stairs - Number of Steps: 7  Bathroom Shower/Tub: Tub/Shower unit  Bathroom Toilet: Standard  Bathroom Equipment: Grab bars in [address] (pt denies using any DME at a baseline.)  ADL Assistance: [address]: Independent (pt reports he can ask his grandchildren to perform laundry if he feels unable to go downstairs.)  Homemaking Responsibilities: Yes (shares with family.)  Ambulation Assistance: Independent  Transfer Assistance: Independent  Active : No  Patient's  Info: medical vehicle drives him to his appts. 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 Vision/Hearing  Vision  Vision: Within Functional Limits  Hearing  Hearing: Within functional limits    Cognition   Cognition  Overall Cognitive Status: WFL     Objective               AROM RLE (degrees)  RLE AROM: WFL  AROM LLE (degrees)  LLE AROM : WFL  AROM RUE (degrees)  RUE AROM : WFL  AROM LUE (degrees)  LUE AROM : WFL  Strength RLE  Strength RLE: WFL  Comment: Grossly 4-/5  Strength LLE  Strength LLE: WFL  Comment: Grossly 4/5  Strength RUE  Strength RUE: WFL  Comment: Grossly 4/5  Strength LUE  Strength LUE: WFL  Comment: Grossly 4/5           Bed mobility  Bed Mobility Comments: Pt began today's session up in a chair, retired up to a chair at the conclusion of today's session. Transfers  Sit to Stand: Contact guard assistance  Stand to sit: Contact guard assistance  Ambulation  Surface: level tile  Device: Rolling Walker  Assistance: Contact guard assistance  Quality of Gait: fair stability, decreased gait speed, no LOB.   Gait Deviations: Slow Karyn;Decreased step length  Distance: 55 feet  More Ambulation?: No  Stairs/Curb  Stairs?: No     Balance  Posture: Fair  Sitting - Static: Good  Sitting - Dynamic: Good  Standing - Static: Fair;+  Standing - Dynamic: Fair  Comments: standing balance assessed while using a RW                                                        AM-PAC Score  AM-PAC Inpatient Mobility Raw Score : 18 (06/01/22 1529)  AM-PAC Inpatient T-Scale Score : 43.63 (06/01/22 1529)  Mobility Inpatient CMS 0-100% Score: 46.58 (06/01/22 1529)  Mobility Inpatient CMS G-Code Modifier : CK (06/01/22 1529)          Goals  Short Term Goals  Time Frame for Short term goals: 14 visits  Short term goal 1: Pt will ambulate  300 feet with least restrictive device and supervision to increase functional independence. Short term goal 2: Pt will negotiate 7 stairs with no handrail and SBA to allow the pt to enter prior living arrangements. Short term goal 3: Pt will tolerate a 35 minute therapy session to promote increased endurance. Short term goal 4: Pt will demonstrate good- standing balance to decrease fall risk. Short term goal 5: Pt will perform sit<>stand transfer independently to increase functional independence. Education  Patient Education  Education Given To: Patient  Education Provided: Role of Therapy;Transfer Training;Equipment;Plan of Care; Fall Prevention Strategies  Education Method: Verbal  Barriers to Learning: None  Education Outcome: Verbalized understanding      Therapy Time   Individual Concurrent Group Co-treatment   Time In 1342         Time Out 1355         Minutes 13         Timed Code Treatment Minutes: 8 Minutes       Kwabena Mckay PT

## 2022-06-01 NOTE — PROGRESS NOTES
[facility]  Internal Medicine Teaching Residency Program  Inpatient Daily Progress Note  ______________________________________________________________________________    Patient: Yasmine Lennon  YOB: 1960   KQV:[id number]    Acct: [de-identified]     Room: [facility]5777-82  Admit date: 5/31/2022  Today's date: 06/01/22  Number of days in the hospital: 1    SUBJECTIVE   Admitting Diagnosis: Maricarmen-rectal abscess  CC: Rectal pain  Pt examined at bedside. Chart & results reviewed. Patient rectal pain is improved significantly. Blood pressure is 120/50. Heart rate 58. Remained afebrile. Saturating well on room air. Labs reviewed and evaluated. Leukocytosis improving 14.5. Hemoglobin 9.0  Sodium 134 potassium 4.5 chloride 93. BUN 27, creatinine 9.49 on TTS hemodialysis. proBNP 1685. Troponin 123-118. ROS:  Constitutional:  negative for chills, fevers, sweats  Respiratory:  negative for cough, dyspnea on exertion, hemoptysis, shortness of breath, wheezing  Cardiovascular:  negative for chest pain, chest pressure/discomfort, lower extremity edema, palpitations  Gastrointestinal: Positive for rectal pain. Negative for abdominal pain, constipation, diarrhea, nausea, vomiting  Neurological:  negative for dizziness, headache  BRIEF HISTORY     Patient is 45-year-old male with a past medical history significant for ESRD on right femoral Pj hemodialysis-TTS schedule, nonischemic cardiomyopathy with ejection fraction 40 to 45% (ECHO 2/28/22) s/p AICD COPD not on home oxygen, paroxysmal atrial fibrillation on Lopressor, not on anticoagulant (due to bleeding catheter site), previous history of diabetes mellitus recent HbA1c 5.4, essential hypertension presented with rectal pain and generalized fatigue. Patient admits to significant weakness and fatigue recently.   In the ED patient was found to have low-grade fever and significant leukocytosis with lactic acidosis. CT abdomen was done which showed 2.2 cm x 1.7 cm x 3.5 cm perianal abscess with mid to lower rectum on thickening likely from proctitis. Neurosurgery was taken on board and they recommend medical management with IV antibiotic, ruled out necrotizing fasciitis. Infectious disease taken on board and patient is started on IV vancomycin cefepime and Flagyl and cultures have been sent. Nephrology was taken on board for ESRD and need of hemodialysis. Of note, he was taking Eliquis in the past for atrial fibrillation but started having significant bleeding from the femoral site-continue catheter and he was advised to stop Eliquis. He did mention that his previous fistulas were malfunctioned and he underwent recent reconstruction of fistula in his left arm which is yet to mature. His previous dialysis catheter were removed due to infection concern x2. OBJECTIVE     Vital Signs:  BP (!) 122/50   Pulse 88   Temp (!) 96 °F (35.6 °C) (Oral)   Resp 14   Ht 6' 1\" (1.854 m)   Wt 267 lb 8 oz (121.3 kg)   SpO2 94%   BMI 35.29 kg/m²     Temp (24hrs), Av.8 °F (36.6 °C), Min:96 °F (35.6 °C), Max:98.9 °F (37.2 °C)    In: 300   Out: 2100     PHYSICAL EXAMINATION:  Constitutional: This is a well developed, well nourished, 35-39.9 - Obesity Grade II 62y.o. year old male who is alert, oriented, cooperative and in no apparent distress. Head:normocephalic and atraumatic. EENT:  PERRLA. No conjunctival injections. Septum was midline, mucosa was without erythema, exudates or cobblestoning. No thrush was noted. Neck: Supple without thyromegaly. No elevated JVP. Trachea was midline. Respiratory: Chest was symmetrical without dullness to percussion. Breath sounds bilaterally were clear to auscultation. There were no wheezes, rhonchi or rales. There is no intercostal retraction or use of accessory muscles. No egophony noted. Mildly increased work of breathing.   Cardiovascular: Regular without murmur, clicks, gallops or rubs. Abdomen: Slightly rounded and soft without organomegaly. No rebound, rigidity or guarding was appreciated. Lymphatic: No lymphadenopathy. Musculoskeletal: Normal curvature of the spine. No gross muscle weakness. Extremities:  No lower extremity edema, ulcerations, tenderness, varicosities or erythema. Muscle size, tone and strength are normal.  No involuntary movements are noted. Right femoral catheter noted. Has failed fistulas on the left arm and right arm. Skin:  Warm and dry. Good color, turgor and pigmentation. No lesions or scars. No cyanosis or clubbing. Neurological/Psychiatric: The patient's general behavior, level of consciousness, thought content and emotional status is normal.   Anorectal: No external abnormalities noted, no wounds, no fluctuance or induration. On CHRISTIE there is tenderness to palpation circumferentially without palpable abnormality, no crepitus or gas present.        Medications:  Scheduled Medications:    [START ON 6/2/2022] vancomycin  1,750 mg IntraVENous Once per day on Tue Thu Sat    [Held by provider] amLODIPine  5 mg Oral Daily    aspirin  81 mg Oral Daily    b complex-C-folic acid  1 mg Oral Daily    cinacalcet  60 mg Oral Once per day on Tue Thu Sat    gabapentin  300 mg Oral Nightly    lanthanum  1,000 mg Oral TID WC    methocarbamol  750 mg Oral TID    epoetin que-epbx  5,000 Units IntraVENous Once per day on Tue Thu Sat    metoprolol succinate  50 mg Oral Daily    sodium chloride flush  5-40 mL IntraVENous 2 times per day    heparin (porcine)  5,000 Units SubCUTAneous TID    famotidine  20 mg Oral Daily    metroNIDAZOLE  500 mg Oral 3 times per day    cefepime  1,000 mg IntraVENous Q24H    vancomycin (VANCOCIN) intermittent dosing (placeholder)   Other RX Placeholder     Continuous Infusions:    sodium chloride       PRN Medicationsmidodrine, 10 mg, BID PRN  sodium chloride flush, 5-40 mL, PRN  sodium chloride, , PRN  acetaminophen, 650 mg, Q6H PRN   Or  acetaminophen, 650 mg, Q6H PRN  albuterol, 2.5 mg, Q4H PRN  sodium chloride, 250 mL, PRN  sodium chloride, 150 mL, PRN  heparin (porcine), 2,600 Units, PRN  heparin (porcine), 2,600 Units, PRN        Diagnostic Labs:  CBC:   Recent Labs     05/31/22 0537 06/01/22 0538   WBC 17.5* 14.5*   RBC 3.30* 2.98*   HGB 9.9* 9.0*   HCT 31.5* 27.3*   MCV 95.5 91.6   RDW 14.9* 15.1*    183     BMP:   Recent Labs     05/31/22 0537 06/01/22 0538    134*   K 4.3 4.5   CL 91* 93*   CO2 25 26   BUN 34* 27*   CREATININE 12.28* 9.49*     BNP: No results for input(s): BNP in the last 72 hours. PT/INR:   Recent Labs     05/31/22 0537   PROTIME 11.6   INR 1.1     APTT:   Recent Labs     05/31/22 0537   APTT 27.7     CARDIAC ENZYMES: No results for input(s): CKMB, CKMBINDEX, TROPONINI in the last 72 hours. Invalid input(s): CKTOTAL;3  FASTING LIPID PANEL:  Lab Results   Component Value Date    CHOL 151 02/02/2014    HDL 29 (L) 02/02/2014    TRIG 230 (H) 04/11/2015     LIVER PROFILE:   Recent Labs     05/31/22 0537   AST 18   ALT 6   BILITOT 0.54   ALKPHOS 95      MICROBIOLOGY:   Lab Results   Component Value Date/Time    CULTURE NO GROWTH 1 DAY 05/31/2022 05:35 AM       Imaging:    CT ABDOMEN PELVIS W IV CONTRAST Additional Contrast? None    Result Date: 5/31/2022  1. Approximally 2.2 cm x 1.7 cm x 3.5 cm perianal abscess as above. Consider further evaluation with MRI if there is concern for an associated sinus tract or fistula. 2. Irregular wall thickening in the mid to lower rectum with adjacent perirectal stranding potentially due to proctitis. However, malignancy could appear similar in should be considered given the presence of inferior mesenteric and bilateral mesorectal lymph nodes with increased prominence that could be metastatic or reactive. Consider endoscopy. 3. Suspicion for mild cellulitis in the medial buttocks near the anus.  Findings of necrotizing fasciitis. 4. Potential for cystitis. 5. Borderline atrophic kidneys with numerous simple appearing cysts, potentially sequelae of chronic hemodialysis. The cysts are incompletely evaluated with complicated cysts and solid neoplasms not excluded. Recommend further evaluation with renal protocol abdomen MRI (preferred) or CT on a nonemergent basis. XR CHEST PORTABLE    Result Date: 5/31/2022  Left-sided HERO catheter with the tip over the proximal right atrium. No pneumothorax is identified. The elevated right hemidiaphragm is redemonstrated likely with some basilar atelectasis/scarring. The remaining lungs do not have acute process. Stent over the right subclavian region is stable with narrowing of its midportion. ASSESSMENT & PLAN     ASSESSMENT / PLAN:   Impression  Patient is a 58-year-old male with a past medical history significant for ESRD on hemodialysis, placement atrial fibrillation not on anticoagulant, nonischemic cardiomyopathy status post AICD with ejection fraction 35 to 40%, essential hypertension, COPD not on home oxygen presented with rectal pain and found to have perirectal abscess. Started on IV vancomycin cefepime and Flagyl as per ID. General surgery on board and recommend medical management with IV antibiotics. Principal Problem:  Perirectal abscess:  Plan:   Started on IV vancomycin and Zosyn and Flagyl. Infectious disease on board, follow-up on the recommendation. General surgery consulted for possible drainage. They recommend medical management with IV antibiotics. Concern for necrotizing fasciitis is ruled out as per general surgery. Leukocytosis improving. Blood cultures are negative so far. Patient is improving symptomatically. Active Problems:  ESRD on HD:  Currently dialyzing with a right femoral Pj catheter.   He did mention that his previous fistulas were malfunctioned and he underwent recent reconstruction of fistula in his left arm which is yet to mature. His previous dialysis catheter were removed due to infection concern x2  Eliquis for atrial fibrillation was held due to bleeding from the [place] catheter site. Paroxysmal atrial fibrillation : [id number] score 3  Rate controlled with Lopressor. He was previously started Eliquis but was held due to significant bleeding from the [place] catheter site. Will need nephrology and vascular surgery input regarding restarting Eliquis. Congestive heart failure with reduced ejection fraction 40%:  Patient home medication include losartan 50 mg, Toprol-XL 50 mg. As per GDMT, his heart failure medication need to be optimized in the background of essential hypertension. Will need SGLT2, sacubitril/valsartan instead of losartan. We will continue with Toprol-XL. Controlled type 2 diabetes mellitus with chronic kidney disease on chronic dialysis, without long-term current use of insulin ([facility] [address]  Plan:   Patient did have a history of longstanding diabetes mellitus but has been off the antidiabetic medication for the last 6 years. Seems to be burnout diabetes. Currently does not require any antidiabetic medication. Anemia of chronic disease  Plan:   Retacrit 5000 units IV 3 times daily. Continue with folic acid and vitamin B complex. DVT ppx : Heparin 5000 units 3 times daily. GI ppx: Pepcid 20 mg p.o. PT/OT: Consulted  Discharge Planning / SW: Ongoing    Fabiano Mustafa MD  Internal Medicine Resident, PGY-1  [facility];  [place], [place]  6/1/2022, 11:51 AM

## 2022-06-01 NOTE — PLAN OF CARE
Problem: ABCDS Injury Assessment  Goal: Absence of physical injury  Outcome: Progressing     Problem: Safety - Adult  Goal: Free from fall injury  Outcome: Progressing     Problem: Metabolic/Fluid and Electrolytes - Adult  Goal: Electrolytes maintained within normal limits  Outcome: Progressing  Goal: Hemodynamic stability and optimal renal function maintained  Outcome: Progressing

## 2022-06-01 NOTE — H&P
[address]     Department of Internal Medicine - Staff Internal Medicine Teaching Service          ADMISSION NOTE/HISTORY AND PHYSICAL EXAMINATION   Date: 5/31/2022  Patient Name: Ole Pisano  Date of admission: 5/31/2022  4:03 AM  YOB: 1960  PCP: No primary care provider on file. History Obtained From:  patient, electronic medical record    [facility]     Chief complaint:   Chief Complaint   Patient presents with    Fatigue     feeling tired after dialysis on Sat.  Other     rectum pain          HISTORY OF PRESENTING ILLNESS     The patient is a pleasant 62 y.o. male presents with a chief complaint of generalized fatigue after dialysis and rectal pain which has been ongoing for a week. States that the pain started spontaneously and is constant since yesterday, progressively worsening. Denies any constipation, diarrhea or abdominal pain. Also complains of intermittent rectal bleeding. Denies any trauma, fever, chills, shortness of breath, chest pain or lightheadedness. Denies urinary symptoms. Patient reported that he had blood in stools last year for which colonoscopy was done with removal of polyps. PMH significant for  ESRD on HD, TTS schedule follow-up with Dr. Giovanny Pickens cardiomyopathy with EF 40 to 45% (ECHO 2/28/22) s/p AICD  COPD not on home oxygen  Paroxysmal A. fib on Lopressor, not on Eliquis. Anemia of chronic disease likely secondary to ESRD  DM type II last HbA1c 5.4 in 2015  Hypertension    Patient is in dialysis for ESRD secondary to diabetic nephrosclerosis and follows up with nephrologist Dr. Fitzpatrick Pac right femoral tunneled catheter. 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    On arrival to the ED   Patient was alert and oriented into 4, drowsy. Vitals: Temperature 100.6, pulse 100, /68, respiratory rate 18 and SPO2 96% on RA. Significant labs:   Lactic acid 3.0, proBNP 1685, troponins 122 > 118.   Potassium tues-thurs-sat    Hypertension     treated w/ meds    Obesity        PAST SURGICAL HISTORY     Past Surgical History:   Procedure Laterality Date    CARDIAC DEFIBRILLATOR PLACEMENT  2006    IR TUNNELED CATHETER PLACEMENT GREATER THAN 5 YEARS  2/28/2022    IR TUNNELED CATHETER PLACEMENT GREATER THAN 5 YEARS 2/28/2022 [facility]    PACEMAKER PLACEMENT      VASCULAR SURGERY      L avf       ALLERGIES     Spironolactone    MEDICATIONS PRIOR TO ADMISSION     Prior to Admission medications    Medication Sig Start Date End Date Taking? Authorizing Provider   amLODIPine (NORVASC) 5 MG tablet Take 1 tablet by mouth daily 3/5/22   Kayley Marquez DO   B Complex-C-Folic Acid (LOLA-GRACE) TABS TAKE 1 TABLET BY MOUTH EVERY EVENING 3/4/22   Jose C Marquez DO   gabapentin (NEURONTIN) 300 MG capsule Take 300 mg by mouth daily as needed.      Historical Provider, MD   Methoxy PEG-Epoetin Beta (MIRCERA) 30 MCG/0.3ML SOSY Inject 30 mcg as directed every 14 days Patient takes this with dialysis every 2 weeks    Historical Provider, MD   Cholecalciferol (VITAMIN D3) 1.25 MG (28455 UT) CAPS Take 1.25 capsules by mouth See Admin Instructions Patient takes this medication 3 times a week with dialysis    Historical Provider, MD   cinacalcet (SENSIPAR) 60 MG tablet 60 mg every Tuesday Thursday and Saturday with dialysis  Patient taking differently: Take 60 mg by mouth three times a week 60 mg every Tuesday Thursday and Saturday with dialysis 10/21/20   Diane Kelly MD   methocarbamol (ROBAXIN) 750 MG tablet Take 750 mg by mouth 3 times daily as needed     Historical Provider, MD   lanthanum (FOSRENOL) 1000 MG chewable tablet Take 1,000 mg by mouth 3 times daily (with meals)  7/15/19   Historical Provider, MD   metoprolol succinate (TOPROL XL) 50 MG extended release tablet Take 50 mg by mouth daily  7/3/19   Historical Provider, MD   aspirin 81 MG chewable tablet Take 81 mg by mouth daily    Historical Provider, MD midodrine (PROAMATINE) 10 MG tablet Take 10 mg by mouth 2 times daily as needed (at dialysis as needed)     Historical Provider, MD       SOCIAL HISTORY     Tobacco: Never smoker  Alcohol: Denied  Illicits: Denied  Occupation:     FAMILY HISTORY     Family History   Adopted: Yes       PHYSICAL EXAM     Vitals: BP (!) 134/53   Pulse 85   Temp 99.6 °F (37.6 °C) (Axillary)   Resp 14   Wt 240 lb (108.9 kg)   SpO2 98%   BMI 31.66 kg/m²   Tmax: Temp (24hrs), Av °F (37.8 °C), Min:99.6 °F (37.6 °C), Max:100.6 °F (38.1 °C)    Last Body weight:   Wt Readings from Last 3 Encounters:   22 240 lb (108.9 kg)   22 242 lb 4.8 oz (109.9 kg)   10/20/20 266 lb 5.1 oz (120.8 kg)     Body Mass Index : Body mass index is 31.66 kg/m². PHYSICAL EXAMINATION:  Constitutional: This is a well developed, well nourished, 35-39.9 - Obesity Grade II 62y.o. year old male who is alert, oriented, cooperative and in no apparent distress. Head:normocephalic and atraumatic. EENT:  PERRLA. No conjunctival injections. Septum was midline, mucosa was without erythema, exudates or cobblestoning. No thrush was noted. Neck: Supple without thyromegaly. No elevated JVP. Trachea was midline. Respiratory: Chest was symmetrical without dullness to percussion. Breath sounds bilaterally were clear to auscultation. There were no wheezes, rhonchi or rales. There is no intercostal retraction or use of accessory muscles. No egophony noted. Mildly increased work of breathing. Cardiovascular: Regular without murmur, clicks, gallops or rubs. Abdomen: Slightly rounded and soft without organomegaly. No rebound, rigidity or guarding was appreciated. Lymphatic: No lymphadenopathy. Musculoskeletal: Normal curvature of the spine. No gross muscle weakness. Extremities:  No lower extremity edema, ulcerations, tenderness, varicosities or erythema. Muscle size, tone and strength are normal.  No involuntary movements are noted. Right femoral catheter noted. Has failed fistulas on the left arm and right arm. Skin:  Warm and dry. Good color, turgor and pigmentation. No lesions or scars. No cyanosis or clubbing. Neurological/Psychiatric: The patient's general behavior, level of consciousness, thought content and emotional status is normal.   Anorectal: No external abnormalities noted, no wounds, no fluctuance or induration. On CHRISTIE there is tenderness to palpation circumferentially without palpable abnormality, no crepitus or gas present. INVESTIGATIONS     Laboratory Testing:     Recent Results (from the past 24 hour(s))   Culture, Blood 2    Collection Time: 05/31/22  5:25 AM    Specimen: Blood   Result Value Ref Range    Specimen Description . BLOOD     Special Requests RT FA 10ML     Culture NO GROWTH 12 HOURS    Culture, Blood 1    Collection Time: 05/31/22  5:35 AM    Specimen: Blood   Result Value Ref Range    Specimen Description . BLOOD     Special Requests rt fa 10ml     Culture NO GROWTH 12 HOURS    CBC with Auto Differential    Collection Time: 05/31/22  5:37 AM   Result Value Ref Range    WBC 17.5 (H) 3.5 - 11.3 k/uL    RBC 3.30 (L) 4.21 - 5.77 m/uL    Hemoglobin 9.9 (L) 13.0 - 17.0 g/dL    Hematocrit 31.5 (L) 40.7 - 50.3 %    MCV 95.5 82.6 - 102.9 fL    MCH 30.0 25.2 - 33.5 pg    MCHC 31.4 28.4 - 34.8 g/dL    RDW 14.9 (H) 11.8 - 14.4 %    Platelets 795 478 - 438 k/uL    MPV 10.9 8.1 - 13.5 fL    NRBC Automated 0.0 0.0 per 100 WBC    Immature Granulocytes 0 0 %    Seg Neutrophils 80 (H) 36 - 66 %    Lymphocytes 6 (L) 24 - 44 %    Monocytes 14 (H) 1 - 7 %    Eosinophils % 0 (L) 1 - 4 %    Basophils 0 0 - 2 %    Absolute Immature Granulocyte 0.00 0.00 - 0.30 k/uL    Segs Absolute 14.00 (H) 1.8 - 7.7 k/uL    Absolute Lymph # 1.05 1.0 - 4.8 k/uL    Absolute Mono # 2.45 (H) 0.1 - 0.8 k/uL    Absolute Eos # 0.00 0.0 - 0.4 k/uL    Basophils Absolute 0.00 0.0 - 0.2 k/uL    Morphology Normal    Troponin    Collection Time: 05/31/22  5:37 AM   Result Value Ref Range    Troponin, High Sensitivity 122 (HH) 0 - 22 ng/L   Brain Natriuretic Peptide    Collection Time: 05/31/22  5:37 AM   Result Value Ref Range    Pro-BNP 1,685 (H) <300 pg/mL   Comprehensive Metabolic Panel w/ Reflex to MG    Collection Time: 05/31/22  5:37 AM   Result Value Ref Range    Glucose 69 (L) 70 - 99 mg/dL    BUN 34 (H) 8 - 23 mg/dL    CREATININE 12.28 (HH) 0.70 - 1.20 mg/dL    Calcium 9.4 8.6 - 10.4 mg/dL    Sodium 135 135 - 144 mmol/L    Potassium 4.3 3.7 - 5.3 mmol/L    Chloride 91 (L) 98 - 107 mmol/L    CO2 25 20 - 31 mmol/L    Anion Gap 19 (H) 9 - 17 mmol/L    Alkaline Phosphatase 95 40 - 129 U/L    ALT 6 5 - 41 U/L    AST 18 <40 U/L    Total Bilirubin 0.54 0.3 - 1.2 mg/dL    Total Protein 7.7 6.4 - 8.3 g/dL    Albumin 3.7 3.5 - 5.2 g/dL    Albumin/Globulin Ratio 0.9 (L) 1.0 - 2.5    GFR Non-African American 4 (L) >60 mL/min    GFR African American 5 (L) >60 mL/min    GFR Comment         Protime-INR    Collection Time: 05/31/22  5:37 AM   Result Value Ref Range    Protime 11.6 9.1 - 12.3 sec    INR 1.1    APTT    Collection Time: 05/31/22  5:37 AM   Result Value Ref Range    PTT 27.7 20.5 - 30.5 sec   Lactate, Sepsis    Collection Time: 05/31/22  5:37 AM   Result Value Ref Range    Lactic Acid, Sepsis, Whole Blood 3.0 (H) 0.5 - 1.9 mmol/L   TSH with Reflex    Collection Time: 05/31/22  5:37 AM   Result Value Ref Range    TSH 0.67 0.30 - 5.00 uIU/mL   Troponin    Collection Time: 05/31/22  6:24 AM   Result Value Ref Range    Troponin, High Sensitivity 118 (HH) 0 - 22 ng/L   Lactate, Sepsis    Collection Time: 05/31/22  6:24 AM   Result Value Ref Range    Lactic Acid, Sepsis, Whole Blood 1.3 0.5 - 1.9 mmol/L   Lactate, Sepsis    Collection Time: 05/31/22 11:08 AM   Result Value Ref Range    Lactic Acid, Sepsis, Whole Blood 1.0 0.5 - 1.9 mmol/L   Troponin    Collection Time: 05/31/22 11:08 AM   Result Value Ref Range    Troponin, High Sensitivity 123 (HH) 0 - 22 ng/L   Hemoglobin A1C    Collection Time: 05/31/22 11:08 AM   Result Value Ref Range    Hemoglobin A1C 5.0 4.0 - 6.0 %    Estimated Avg Glucose 97 mg/dL       Imaging:   CT ABDOMEN PELVIS W IV CONTRAST Additional Contrast? None    Result Date: 5/31/2022  1. Approximally 2.2 cm x 1.7 cm x 3.5 cm perianal abscess as above. Consider further evaluation with MRI if there is concern for an associated sinus tract or fistula. 2. Irregular wall thickening in the mid to lower rectum with adjacent perirectal stranding potentially due to proctitis. However, malignancy could appear similar in should be considered given the presence of inferior mesenteric and bilateral mesorectal lymph nodes with increased prominence that could be metastatic or reactive. Consider endoscopy. 3. Suspicion for mild cellulitis in the medial buttocks near the anus. Findings of necrotizing fasciitis. 4. Potential for cystitis. 5. Borderline atrophic kidneys with numerous simple appearing cysts, potentially sequelae of chronic hemodialysis. The cysts are incompletely evaluated with complicated cysts and solid neoplasms not excluded. Recommend further evaluation with renal protocol abdomen MRI (preferred) or CT on a nonemergent basis. XR CHEST PORTABLE    Result Date: 5/31/2022  Left-sided HERO catheter with the tip over the proximal right atrium. No pneumothorax is identified. The elevated right hemidiaphragm is redemonstrated likely with some basilar atelectasis/scarring. The remaining lungs do not have acute process. Stent over the right subclavian region is stable with narrowing of its midportion.        ASSESSMENT & PLAN     ASSESSMENT / PLAN:     Principal Problem:    Maricarmen-rectal abscess  Active Problems:    Hypoglycemia    Pulmonary hyperinflation    Biventricular CHF (congestive heart failure) (HCC) with icd chronic     Paroxysmal atrial fibrillation (HCC)    Controlled type 2 diabetes mellitus with chronic kidney disease on chronic dialysis, without long-term current use of insulin (HCC)    Lactic acidosis    ESRD (end stage renal disease) on dialysis (HCC)    Leukocytosis    Anemia of chronic disease    CAD (coronary artery disease)    Perianal abscess    Proctitis    Necrotizing fasciitis ([facility] [address]  Resolved Problems:    * No resolved hospital problems. *    1. Perirectal abscess versus proctitis and satellite lymph node enlargement: Continue antibiotics, currently on cefazolin, vancomycin and clindamycin due to concern for necrotizing fasciitis on CT imaging. General surgery consulted from ED, recommended switching antibiotics to Cipro and Flagyl. No plans for operational intervention at this time. ID consulted, recommends cefepime and vancomycin with dialysis and continue with Flagyl. 2.  ESRD on HD: Patient is on TTS schedule, last dialysis was on Saturday. Nephrology consulted, probable dialysis today through right femoral catheter. 3.  Paroxysmal A. Fib: Rate controlled on Lopressor. [id number] Score: 3. Was on Eliquis but was stopped due to bleeding from femoral cath. Currently not on anticoagulation. 4.  Hypertension: Currently blood pressure stable, home medications held Norvasc, will resume once BP tolerates. 5.  Type II DM: Patient currently hypoglycemic with glucose 69. Patient does not use insulin or antihyperglycemic's at home. HbA1c 5.0. We will start insulin and hypoglycemia protocol if needed. 6.  CHF s/p AICD: Not in acute exacerbation. 7.  Sleep apnea: Noncompliant with CPAP    8. Lactic acidosis: Resolved. 9.  Anemia due to chronic kidney disease:  Retacrit 5000 units IV 3 times daily. Continue folic acid and vitamin B complex. DVT ppx: Heparin subcu 5000 units 3 times daily  GI ppx: Pepcid 20 mg oral daily  Diet: Carb controlled, low sodium, low potassium and low phosphorus with fluid restriction to 1500 mL regular diet.      PT/OT/SW: Consulted  Discharge Planning: Patient lives alone at home, would like to go back home once stable. Charlotte Youssef MD  Internal Medicine Resident, PGY-1  [address], [place]  5/31/2022, 11:02 am

## 2022-06-01 NOTE — CARE COORDINATION
Case Management Initial Discharge Plan  Donne Oppenheim,             Met with:patient to discuss discharge plans. Information verified: address, contacts, phone number, , insurance Yes  Insurance Provider: Dorrie Kawasaki: No    Emergency Contact/Next of Kin name & number: Kena Owens [phone]  Who are involved in patient's support system? Cousin and daughter    PCP: No primary care provider on file. Date of last visit:       Discharge Planning    Living Arrangements: lives alone. Patient has cousin next door and daughter temporarily staying with him    Home has 1 stories  7 stairs to climb to get into front door    Patient able to perform ADL's:Independent    Current Services (outpatient & in home) none  DME equipment: has walker  DME provider: na    Is patient receiving oral anticoagulation therapy? No      Does patient have any issues/concerns obtaining medications? No  If yes, what are patient's concerns? Is there a preferred Pharmacy after hours or on weekends? Yes    If yes, which pharmacy? [facility] outpatient pharmacy    Potential Assistance Needed:  N/A    Patient agreeable to home care: Yes, if needed. [facility] past  Freedom of choice provided:  yes    Prior SNF/Rehab Placement and Facility: no  Agreeable to SNF/Rehab: No  [place] of choice provided: n/a     Evaluation: no    Expected Discharge date:       Patient expects to be discharged to:   home    If home: is the family and/or caregiver wiling & able to provide support at home? yes  Who will be providing this support?  Cousin and daughter    Follow Up Appointment: Best Day/ Time:      Transportation provider: uses medical cabs  Transportation arrangements needed for discharge: Yes    Readmission Risk              Risk of Unplanned Readmission:  22             Does patient have a readmission risk score greater than 14?: Yes  If yes, follow-up appointment must be made within 7 days of discharge. Goals of Care: comfort      Educated patient on transitional options, provided freedom of choice and are agreeable with plan      Discharge Plan: home independently. Patient has daughter temporarily staying with him and cousin next door. He reports good family support. He has a walker at home but is not using it. He is agreeable to home care if needed but plans to go home independently. He will need a medical cab ride home. [place] of choice provided and patient is agreeable to plan.           Electronically signed by Дмитрий Jaramillo RN on 6/1/22 at 4:45 PM EDT

## 2022-06-02 VITALS
TEMPERATURE: 98.3 F | RESPIRATION RATE: 16 BRPM | HEIGHT: 73 IN | WEIGHT: 268.52 LBS | SYSTOLIC BLOOD PRESSURE: 105 MMHG | HEART RATE: 84 BPM | BODY MASS INDEX: 35.59 KG/M2 | OXYGEN SATURATION: 97 % | DIASTOLIC BLOOD PRESSURE: 64 MMHG

## 2022-06-02 LAB
ABSOLUTE EOS #: 0.43 K/UL (ref 0–0.4)
ABSOLUTE IMMATURE GRANULOCYTE: 0 K/UL (ref 0–0.3)
ABSOLUTE LYMPH #: 3.42 K/UL (ref 1–4.8)
ABSOLUTE MONO #: 1.28 K/UL (ref 0.1–0.8)
ANION GAP SERPL CALCULATED.3IONS-SCNC: 18 MMOL/L (ref 9–17)
BASOPHILS # BLD: 1 % (ref 0–2)
BASOPHILS ABSOLUTE: 0.11 K/UL (ref 0–0.2)
BUN BLDV-MCNC: 40 MG/DL (ref 8–23)
CALCIUM SERPL-MCNC: 9.4 MG/DL (ref 8.6–10.4)
CHLORIDE BLD-SCNC: 94 MMOL/L (ref 98–107)
CO2: 25 MMOL/L (ref 20–31)
CREAT SERPL-MCNC: 11.88 MG/DL (ref 0.7–1.2)
EOSINOPHILS RELATIVE PERCENT: 4 % (ref 1–4)
GFR AFRICAN AMERICAN: 5 ML/MIN
GFR NON-AFRICAN AMERICAN: 4 ML/MIN
GFR SERPL CREATININE-BSD FRML MDRD: ABNORMAL ML/MIN/{1.73_M2}
GLUCOSE BLD-MCNC: 92 MG/DL (ref 70–99)
HCT VFR BLD CALC: 29.4 % (ref 40.7–50.3)
HEMOGLOBIN: 9.4 G/DL (ref 13–17)
IMMATURE GRANULOCYTES: 0 %
LYMPHOCYTES # BLD: 32 % (ref 24–44)
MCH RBC QN AUTO: 29.9 PG (ref 25.2–33.5)
MCHC RBC AUTO-ENTMCNC: 32 G/DL (ref 28.4–34.8)
MCV RBC AUTO: 93.6 FL (ref 82.6–102.9)
MONOCYTES # BLD: 12 % (ref 1–7)
MORPHOLOGY: ABNORMAL
NRBC AUTOMATED: 0 PER 100 WBC
PDW BLD-RTO: 15.1 % (ref 11.8–14.4)
PLATELET # BLD: 187 K/UL (ref 138–453)
PMV BLD AUTO: 10.9 FL (ref 8.1–13.5)
POTASSIUM SERPL-SCNC: 4.1 MMOL/L (ref 3.7–5.3)
RBC # BLD: 3.14 M/UL (ref 4.21–5.77)
SEG NEUTROPHILS: 51 % (ref 36–66)
SEGMENTED NEUTROPHILS ABSOLUTE COUNT: 5.46 K/UL (ref 1.8–7.7)
SODIUM BLD-SCNC: 137 MMOL/L (ref 135–144)
WBC # BLD: 10.7 K/UL (ref 3.5–11.3)

## 2022-06-02 PROCEDURE — 99239 HOSP IP/OBS DSCHRG MGMT >30: CPT | Performed by: INTERNAL MEDICINE

## 2022-06-02 PROCEDURE — 6360000002 HC RX W HCPCS: Performed by: STUDENT IN AN ORGANIZED HEALTH CARE EDUCATION/TRAINING PROGRAM

## 2022-06-02 PROCEDURE — 6360000002 HC RX W HCPCS: Performed by: INTERNAL MEDICINE

## 2022-06-02 PROCEDURE — 90935 HEMODIALYSIS ONE EVALUATION: CPT | Performed by: INTERNAL MEDICINE

## 2022-06-02 PROCEDURE — 99232 SBSQ HOSP IP/OBS MODERATE 35: CPT | Performed by: INTERNAL MEDICINE

## 2022-06-02 PROCEDURE — 6370000000 HC RX 637 (ALT 250 FOR IP): Performed by: INTERNAL MEDICINE

## 2022-06-02 PROCEDURE — 6370000000 HC RX 637 (ALT 250 FOR IP): Performed by: STUDENT IN AN ORGANIZED HEALTH CARE EDUCATION/TRAINING PROGRAM

## 2022-06-02 PROCEDURE — 2500000003 HC RX 250 WO HCPCS: Performed by: STUDENT IN AN ORGANIZED HEALTH CARE EDUCATION/TRAINING PROGRAM

## 2022-06-02 PROCEDURE — 80048 BASIC METABOLIC PNL TOTAL CA: CPT

## 2022-06-02 PROCEDURE — 5A1D70Z PERFORMANCE OF URINARY FILTRATION, INTERMITTENT, LESS THAN 6 HOURS PER DAY: ICD-10-PCS | Performed by: INTERNAL MEDICINE

## 2022-06-02 PROCEDURE — 90935 HEMODIALYSIS ONE EVALUATION: CPT

## 2022-06-02 PROCEDURE — 36415 COLL VENOUS BLD VENIPUNCTURE: CPT

## 2022-06-02 PROCEDURE — 2580000003 HC RX 258: Performed by: STUDENT IN AN ORGANIZED HEALTH CARE EDUCATION/TRAINING PROGRAM

## 2022-06-02 PROCEDURE — 85025 COMPLETE CBC W/AUTO DIFF WBC: CPT

## 2022-06-02 RX ADMIN — EPOETIN ALFA-EPBX 5000 UNITS: 10000 INJECTION, SOLUTION INTRAVENOUS; SUBCUTANEOUS at 14:40

## 2022-06-02 RX ADMIN — NEPHROCAP 1 MG: 1 CAP ORAL at 08:05

## 2022-06-02 RX ADMIN — SODIUM CHLORIDE, PRESERVATIVE FREE 10 ML: 5 INJECTION INTRAVENOUS at 08:10

## 2022-06-02 RX ADMIN — METHOCARBAMOL TABLETS 750 MG: 500 TABLET, COATED ORAL at 08:04

## 2022-06-02 RX ADMIN — Medication 1750 MG: at 15:53

## 2022-06-02 RX ADMIN — METRONIDAZOLE 500 MG: 500 TABLET, FILM COATED ORAL at 15:56

## 2022-06-02 RX ADMIN — METRONIDAZOLE 500 MG: 500 TABLET, FILM COATED ORAL at 05:51

## 2022-06-02 RX ADMIN — CINACALCET 60 MG: 30 TABLET ORAL at 08:05

## 2022-06-02 RX ADMIN — ASPIRIN 81 MG: 81 TABLET, CHEWABLE ORAL at 08:04

## 2022-06-02 RX ADMIN — LANTHANUM CARBONATE 1000 MG: 500 TABLET, CHEWABLE ORAL at 17:28

## 2022-06-02 RX ADMIN — HEPARIN SODIUM 2600 UNITS: 1000 INJECTION INTRAVENOUS; SUBCUTANEOUS at 11:17

## 2022-06-02 RX ADMIN — APIXABAN 5 MG: 5 TABLET, FILM COATED ORAL at 08:05

## 2022-06-02 RX ADMIN — LOSARTAN POTASSIUM 50 MG: 50 TABLET, FILM COATED ORAL at 08:05

## 2022-06-02 RX ADMIN — LANTHANUM CARBONATE 1000 MG: 500 TABLET, CHEWABLE ORAL at 08:05

## 2022-06-02 RX ADMIN — FAMOTIDINE 20 MG: 20 TABLET, FILM COATED ORAL at 08:05

## 2022-06-02 RX ADMIN — CEFEPIME HYDROCHLORIDE 1000 MG: 1 INJECTION, POWDER, FOR SOLUTION INTRAMUSCULAR; INTRAVENOUS at 18:12

## 2022-06-02 RX ADMIN — HEPARIN SODIUM 2600 UNITS: 1000 INJECTION INTRAVENOUS; SUBCUTANEOUS at 11:18

## 2022-06-02 RX ADMIN — METHOCARBAMOL TABLETS 750 MG: 500 TABLET, COATED ORAL at 15:56

## 2022-06-02 ASSESSMENT — ENCOUNTER SYMPTOMS
COLOR CHANGE: 0
DIARRHEA: 0
NAUSEA: 0
CONSTIPATION: 1
EYE DISCHARGE: 0
ABDOMINAL DISTENTION: 0
ABDOMINAL PAIN: 0
APNEA: 0
RECTAL PAIN: 1
VOMITING: 0
EYE PAIN: 0
EYE REDNESS: 0

## 2022-06-02 ASSESSMENT — PAIN SCALES - GENERAL
PAINLEVEL_OUTOF10: 0

## 2022-06-02 NOTE — PROGRESS NOTES
[facility] -   Infectious diseases evaluation  admission date 5/31/2022    reason for consultation:   fascitis     Impression :   Current:  · Perianal abscess   · proctitis and satellite LN enlargement  · bandemia  · DM2, COPD, dialysis ESRD/ chest HERO graft, AICD, CHF  ·   Discussion / summary of stay / plan of care   ·   Recommendations   · Agree with cefepime and vancomycin with dialysis - PO Flagyl - x 2 weeks  · Ok for DC  · reconsiled      Infection Control Recommendations   · Warren Precautions  · Contact Isolation       Antimicrobial Stewardship Recommendations   · Simplification of therapy  · Targeted therapy  History of Present Illness:   Initial history:  Reddy Tate is a 62y.o.-year-old male with a past history of diabetes and ESRD on dialysis, comes in with rectal pain, ongoing for about a week. Difficulty having bowel movements,  Last year had a colonoscopy at [facility] and was told he had polyps  No fever or chills    CT scan of the abdomen showed a rectal abscess with fasciitis and could not tell if there is any communication with the fistula-there was a proctitis as well any concern of rectal tumor.     White count is elevated, started on antibiotics, infectious consulted    Interval changes  6/2/2022   Patient Vitals for the past 8 hrs:   BP Temp Temp src Pulse Resp SpO2 Weight   06/02/22 0645 130/66 97.9 °F (36.6 °C) Oral 87 16 97 % --   06/02/22 0504 -- -- -- -- -- -- 268 lb 4 oz (121.7 kg)   6/2  No acute events overnight   Pt resting comfortably in bed this morning  BM yesterday    Cr increased to 11.8  WBC decreased to 10 from 14  Afebrile   VSS    6/1  No fever and still perianal discomfort  Purulence from the carly anal area  abd soft   GS not planning for any interv so far  WBC14  All BC neg      Summary of relevant labs:  Labs:  CREATININE 12.28 High Panic    [id number]   DQC69.7 High  -14    Micro:  BC  Imaging:  CXR  Impression: Left-sided HERO catheter with the tip over the proximal right atrium. No   pneumothorax is identified. The elevated right hemidiaphragm is redemonstrated likely with some basilar   atelectasis/scarring. The remaining lungs do not have acute process. Stent over the right subclavian region is stable with narrowing of its   midportion. CTAP  Impression:  1. Approximally 2.2 cm x 1.7 cm x 3.5 cm perianal abscess as above. Consider   further evaluation with MRI if there is concern for an associated sinus tract   or fistula. 2. Irregular wall thickening in the mid to lower rectum with adjacent   perirectal stranding potentially due to proctitis. However, malignancy could   appear similar in should be considered given the presence of inferior   mesenteric and bilateral mesorectal lymph nodes with increased prominence   that could be metastatic or reactive. Consider endoscopy. 3. Suspicion for mild cellulitis in the medial buttocks near the anus. Findings of necrotizing fasciitis. 4. Potential for cystitis. 5. Borderline atrophic kidneys with numerous simple appearing cysts,   potentially sequelae of chronic hemodialysis. The cysts are incompletely   evaluated with complicated cysts and solid neoplasms not excluded. Recommend   further evaluation with renal protocol abdomen MRI (preferred) or CT on a   nonemergent basis. I have personally reviewed the past medical history, past surgical history, medications, social history, and family history, and I haveupdated the database accordingly. Allergies:   Spironolactone     Review of Systems:     Review of Systems   Constitutional: Negative for activity change. HENT: Negative for congestion. Eyes: Negative for pain, discharge and redness. Respiratory: Negative for apnea. Cardiovascular: Negative for chest pain. Gastrointestinal: Positive for constipation and rectal pain.  Negative for abdominal distention, abdominal pain, diarrhea, nausea and vomiting. Endocrine: Negative for cold intolerance, polyphagia and polyuria. Genitourinary: Negative for dysuria. Musculoskeletal: Negative for arthralgias. Skin: Negative for color change. Allergic/Immunologic: Negative for food allergies. Neurological: Negative for dizziness and weakness. Hematological: Negative for adenopathy. Psychiatric/Behavioral: Negative for agitation. Physical Examination :       Physical Exam  Constitutional:       General: He is not in acute distress. Appearance: Normal appearance. He is not ill-appearing or diaphoretic. HENT:      Head: Normocephalic and atraumatic. Nose: Nose normal.      Mouth/Throat:      Mouth: Mucous membranes are moist.   Eyes:      General: No scleral icterus. Pupils: Pupils are equal, round, and reactive to light. Cardiovascular:      Rate and Rhythm: Normal rate and regular rhythm. Heart sounds: Normal heart sounds. No murmur heard. Pulmonary:      Effort: No respiratory distress. Breath sounds: Normal breath sounds. No wheezing. Chest:      Chest wall: No tenderness. Abdominal:      General: There is no distension. Palpations: Abdomen is soft. Tenderness: There is no abdominal tenderness. Genitourinary:     Comments: No lozano  Musculoskeletal:         General: No swelling, tenderness, deformity or signs of injury. Cervical back: Neck supple. No rigidity or tenderness. Skin:     General: Skin is dry. Findings: Lesion present. Neurological:      General: No focal deficit present. Mental Status: He is alert. Mental status is at baseline. Psychiatric:         Mood and Affect: Mood normal.         Thought Content:  Thought content normal.         Past Medical History:     Past Medical History:   Diagnosis Date    A-V fistula ([facility]     lt arm    AICD (automatic cardioverter/defibrillator) present 2006    Asthma     Atrial fibrillation ([facility]     Blood transfusion reaction     CAD (coronary artery disease)     dr Milan Garland cardiologist recently aicd check /clearance 4/15    CHF (congestive heart failure) (HCC)     CHF (congestive heart failure) (HCC)     COPD (chronic obstructive pulmonary disease) ([facility]     CRF (chronic renal failure)     Diabetes mellitus ([facility]     IDDM    Dialysis patient ([facility]     GERD (gastroesophageal reflux disease)     Hemodialysis patient ([facility]     amanda esthurs-sat    Hypertension     treated w/ meds    Obesity        Past Surgical  History:     Past Surgical History:   Procedure Laterality Date    CARDIAC DEFIBRILLATOR PLACEMENT  2006    IR TUNNELED CATHETER PLACEMENT GREATER THAN 5 YEARS  2/28/2022    IR TUNNELED CATHETER PLACEMENT GREATER THAN 5 YEARS 2/28/2022 [facility]    PACEMAKER PLACEMENT      VASCULAR SURGERY      L avf       Medications:      vancomycin  1,750 mg IntraVENous Once per day on Tue Thu Sat    apixaban  5 mg Oral BID    losartan  50 mg Oral Daily    [Held by provider] amLODIPine  5 mg Oral Daily    aspirin  81 mg Oral Daily    b complex-C-folic acid  1 mg Oral Daily    cinacalcet  60 mg Oral Once per day on Tue Thu Sat    gabapentin  300 mg Oral Nightly    lanthanum  1,000 mg Oral TID WC    methocarbamol  750 mg Oral TID    epoetin que-epbx  5,000 Units IntraVENous Once per day on Tue Thu Sat    metoprolol succinate  50 mg Oral Daily    sodium chloride flush  5-40 mL IntraVENous 2 times per day    famotidine  20 mg Oral Daily    metroNIDAZOLE  500 mg Oral 3 times per day    cefepime  1,000 mg IntraVENous Q24H    vancomycin (VANCOCIN) intermittent dosing (placeholder)   Other RX Placeholder       Social History:     Social History     Socioeconomic History    Marital status: Legally      Spouse name: Not on file    Number of children: Not on file    Years of education: Not on file    Highest education level: Not on file   Occupational History    Not on file Tobacco Use    Smoking status: Never Smoker    Smokeless tobacco: Never Used   Substance and Sexual Activity    Alcohol use: No    Drug use: No    Sexual activity: Not on file   Other Topics Concern    Not on file   Social History Narrative    Not on file     Social Determinants of Health     Financial Resource Strain:     Difficulty of Paying Living Expenses: Not on file   Food Insecurity:     Worried About Running Out of Food in the Last Year: Not on file    Hali of Food in the Last Year: Not on file   Transportation Needs:     Lack of Transportation (Medical): Not on file    Lack of Transportation (Non-Medical):  Not on file   Physical Activity:     Days of Exercise per Week: Not on file    Minutes of Exercise per Session: Not on file   Stress:     Feeling of Stress : Not on file   Social Connections:     Frequency of Communication with Friends and Family: Not on file    Frequency of Social Gatherings with Friends and Family: Not on file    Attends Church Services: Not on file    Active Member of [address] [place] [address] or Organizations: Not on file    Attends Club or Organization Meetings: Not on file    Marital Status: Not on file   Intimate Partner Violence:     Fear of Current or Ex-Partner: Not on file    Emotionally Abused: Not on file    Physically Abused: Not on file    Sexually Abused: Not on file   Housing Stability:     Unable to Pay for Housing in the Last Year: Not on file    Number of [place] in the Last Year: Not on file    Unstable Housing in the Last Year: Not on file       Family History:     Family History   Adopted: Yes      Medical Decision Making:   I have independently reviewed/ordered the following labs:    CBC with Differential:   Recent Labs     06/01/22  0538 06/02/22  0513   WBC 14.5* 10.7   HGB 9.0* 9.4*   HCT 27.3* 29.4*    187   LYMPHOPCT 15* 32   MONOPCT 12* 12*     BMP:  Recent Labs     06/01/22  0538 06/02/22  0513   * 137   K 4.5 4.1   CL 93* 94*   CO2 26 25   BUN 27* 40*   CREATININE 9.49* 11.88*     Hepatic Function Panel:   Recent Labs     05/31/22  0537   PROT 7.7   LABALBU 3.7   BILITOT 0.54   ALKPHOS 95   ALT 6   AST 18     No results for input(s): RPR in the last 72 hours. No results for input(s): HIV in the last 72 hours. No results for input(s): BC in the last 72 hours. Lab Results   Component Value Date    CREATININE 11.88 06/02/2022    GLUCOSE 92 06/02/2022       Detailed results: Thank you for allowing us to participate in the care of this patient. Please call with questions. This note is created with the assistance of a speech recognition program.  While intending to generate adocument that actually reflects the content of the visit, the document can still have some errors including those of syntax and sound a like substitutions which may escape proof reading. It such instances, actual meaningcan be extrapolated by contextual diversion. Luisa Chaparro MD  Office: [phone]  Perfect serve / office [phone]         I have discussed the care of the patient, including pertinent history and exam findings,  with the resident. I have seen and examined the patient and the key elements of all parts of the encounter have been performed by me. I agree with the assessment, plan and orders as documented by the resident.     Jeanie Ornelas, Infectious Diseases

## 2022-06-02 NOTE — CARE COORDINATION
Transitional planning note:  Patient has discharge orders this evening but will require IV Antibiotics with his outpatient dialysis treatments. Patient dialyzes at [facility] on [address] every Tuesday, Thursday, Saturday. Call placed to [facility] @ [phone] and spoke with Anirudh dialysis RN to notify that patient will be discharged with scripts for IV Cefepime and vancomycin to be given with HD treatments. Per Anirudh, the center can accommodate this.  He requests that scripts be faxed to dialysis center @ [phone].       [phone]: faxed IV antibiotic scripts to patient's outpatient dialysis center [facility] on [address] @ [phone]

## 2022-06-02 NOTE — PROGRESS NOTES
Vancomycin Day: 3  Current Regimen: 1750mg post HD for tomorrow. Patient's labs, cultures, vitals, and vancomycin regimen reviewed. Dialysis.  TTS, no vanco level ordered

## 2022-06-02 NOTE — PLAN OF CARE
Problem: ABCDS Injury Assessment  Goal: Absence of physical injury  Outcome: Progressing     Problem: Safety - Adult  Goal: Free from fall injury  Outcome: Progressing     Problem: Metabolic/Fluid and Electrolytes - Adult  Goal: Electrolytes maintained within normal limits  Outcome: Progressing  Goal: Hemodynamic stability and optimal renal function maintained  Outcome: Progressing     Problem: Chronic Conditions and Co-morbidities  Goal: Patient's chronic conditions and co-morbidity symptoms are monitored and maintained or improved  Outcome: Progressing     Problem: Pain  Goal: Verbalizes/displays adequate comfort level or baseline comfort level  Outcome: Progressing

## 2022-06-02 NOTE — PROGRESS NOTES
Dialysis Time Out  To be done by RN and tech or 2 RNs  Staff Names RN Erin Zamora and RN Shanti Urena.     [x]  Identity of the patient using 2 patient identifiers  [x]  Consent for treatment  [x]  Equipment-proper machine and dialyzer  [x]  B-Hep B status  [x]  Orders- to include bath, blood flow, dialyzer, time and fluid removal  [x]  Access-Correct site and in working order  [x]  Time for patient to ask questions.

## 2022-06-02 NOTE — PROGRESS NOTES
Occupational [address]  Occupational Therapy Not Seen Note    DATE: 2022    NAME: Tomas Morgan  MRN: [id number]   : 1960      Patient not seen this date for Occupational Therapy due to:      Pt off the floor for hemodialysis      Next Scheduled Treatment: Will check back 6/3/2022 as able     Electronically signed by Chandler Quinonez OT on 2022 at 10:48 AM

## 2022-06-02 NOTE — PROGRESS NOTES
Physical Therapy        Physical Therapy Cancel Note      DATE: 2022    NAME: Mark Albrecht  MRN: [id number]   : 1960      Patient not seen this date for Physical Therapy due to:    Hemodialysis:      Electronically signed by Octavia Abraham PTA on 2022 at 10:33 AM

## 2022-06-02 NOTE — PROGRESS NOTES
General Surgery:  Daily Progress Note                    PATIENT NAME: Patricia Garza     TODAY'S DATE: 6/2/2022, 5:21 AM    SUBJECTIVE:     Pt seen and examined at bedside. No fevers overnight. Pt denies any pain this morning. He he no complaints. Denies fever, chills, nausea, vomiting, chest pain, and SOB. Tolerating general PO diet. Patient reports bowel and urinary habits are still normal.    OBJECTIVE:   VITALS:  /82   Pulse 72   Temp 97.9 °F (36.6 °C) (Oral)   Resp 16   Ht 6' 1\" (1.854 m)   Wt 268 lb 4 oz (121.7 kg)   SpO2 98%   BMI 35.39 kg/m²      INTAKE/OUTPUT:      Intake/Output Summary (Last 24 hours) at 6/2/2022 0521  Last data filed at 6/1/2022 1110  Gross per 24 hour   Intake 300 ml   Output 0 ml   Net 300 ml       PHYSICAL EXAM:  General Appearance: awake, alert, oriented, in no acute distress  HEENT:  Normocephalic, atraumatic, mucus membranes moist   Heart: Heart sounds are normal.  Regular rate and rhythm without murmur, gallop or rub. Lungs: Normal expansion. Clear to auscultation. No rales, rhonchi, or wheezing. Abdomen: Abdomen nontender w/ bowel sounds present   ANORECTAL: No external abnormalities noted, no wounds, no fluctuance or induration, on CHRISTIE there is tenderness to palpation circumferentially without palpable abnormality, no gross blood  Extremities: No cyanosis, pitting edema, rashes noted. Skin: Skin color, texture, turgor normal. No rashes or lesions.       Data:  CBC:   Lab Results   Component Value Date    WBC 14.5 06/01/2022    RBC 2.98 06/01/2022    HGB 9.0 06/01/2022    HCT 27.3 06/01/2022    MCV 91.6 06/01/2022    MCH 30.2 06/01/2022    MCHC 33.0 06/01/2022    RDW 15.1 06/01/2022     06/01/2022    MPV 10.9 06/01/2022     BMP:    Lab Results   Component Value Date     06/01/2022    K 4.5 06/01/2022    CL 93 06/01/2022    CO2 26 06/01/2022    BUN 27 06/01/2022    LABALBU 3.7 05/31/2022    CREATININE 9.49 06/01/2022    CALCIUM 9.2 06/01/2022    GFRAA 7 06/01/2022    LABGLOM 6 06/01/2022    GLUCOSE 66 06/01/2022       Radiology Review:  No new imaging    ASSESSMENT:  Active Hospital Problems    Diagnosis Date Noted    Hypoglycemia [E16.2] 05/31/2022     Priority: Medium    Pulmonary hyperinflation [R09.89] 05/31/2022     Priority: Medium    Maricarmen-rectal abscess [K61.1] 05/31/2022     Priority: Medium    Perianal abscess [K61.0] 05/31/2022    Proctitis [K62.89] 05/31/2022    Necrotizing fasciitis ([facility] [M72.6] 05/31/2022    CAD (coronary artery disease) [I25.10]     Anemia of chronic disease [D63.8] 05/29/2015    Leukocytosis [D72.829] 04/09/2015    ESRD on dialysis ([facility] [N18.6, Z99.2] 04/08/2015    Lactic acidosis [E87.2] 04/08/2015    Controlled type 2 diabetes mellitus with chronic kidney disease on chronic dialysis, without long-term current use of insulin ([facility] [E11.22, N18.6, Z99.2] 08/22/2014    Paroxysmal atrial fibrillation (HCC) [I48.0] 02/03/2014    Biventricular CHF (congestive heart failure) ([facility] with icd chronic  [I50.82] 01/31/2014       61 year old male with complex medical history who presents with rectal pain and intermittent rectal bleeding. History and physical exam are not consistent with necrotizing fasciitis.  Diagnostic imaging was reviewed carefully and reveals mesorectal lymphadenopathy and possible intramural rim-enhancing collection at the level of the upper rectum which could represent an abscess versus proctitis    Plan:  1. F/u AM labs  2. No plans for operative intervention at this time  3. Nephro consulted, dialysis TTS  4. Continue Cipro, Flagyl and Cefepime per Infectious Disease  5. Will continue to monitor    Electronically signed by Jonatan Issa  on 6/2/2022 at 5:21 AM       General Surgery Resident Statement/Addendum  I have discussed the case, including pertinent history and exam findings with the above medical student. I have personally seen the patient.       Pt seen and examined at bedside. I performed both history and physical exam.      Note was edited and changes made by me. Assessment and plan reviewed and changes made by me. I agree with the assessment and plan as stated above. Continue monitoring on antibiotics. Pt white count normalized. No surgical intervention until acute flare is resolved, will follow-up outpatient for further work-up.     Nazario Lindsay, DO PGY-1  6/2/22 5:52 AM

## 2022-06-02 NOTE — PROGRESS NOTES
Physician Progress Note      Ariadna Velasquez  CSN #:                  [id number]  :                       1960  ADMIT DATE:       2022 4:03 AM  DISCH DATE:  RESPONDING  PROVIDER #:        Emily Bravo          QUERY TEXT:    Pt admitted with Maricarmen-rectal abscess. Pt noted to have WBC 17.5, Lactic Acid   3.0 and temp 100. 6(38.1). If possible, please document in the progress notes   and discharge summary if you are evaluating and /or treating any of the   following:[[Maricarmen-rectal abscess without Sepsis[de-identified] This patient has Perirectal   abscess without Sepsis. The medical record reflects the following:  Risk Factors: Leukocytosis, Lactic acidosis, Maricarmen-rectal abscess, ESRD/ HD   dependent, lymphadenopathy,    Clinical Indicators: WBC 17.5>14.5, Lactic acid 3.0, Temp   100. 6(38.1)>99. 6(37.6), HR . CT ABD Approximately 2.2 cm x 1.7 cm x 3.5   cm perianal abscess. Suspicion for mild cellulitis in the medial buttocks near   the anus. Findings of necrotizing fasciitis. Treatment: ID Consult, labs, monitor, IV Maxipime/Vanco, Dialysis,  Options provided:  -- Sepsis, present on admission  -- Other - I will add my own diagnosis  -- Disagree - Not applicable / Not valid  -- Disagree - Clinically unable to determine / Unknown  -- Refer to Clinical Documentation Reviewer    PROVIDER RESPONSE TEXT:    Provider disagreed with this query. Not meeting sepsis criteria    Query created by:  Lanny Pérez on 2022 7:34 AM      Electronically signed by:  Emily Bravo 2022 9:01 PM

## 2022-06-02 NOTE — CARE COORDINATION
Discharge [facility] Case Management Department  Written by: Delray Dakin, RN    Patient Name: Hardeep Barrera  Attending Provider: Wayne Bradshaw MD  Admit Date: 2022  4:03 AM  MRN: [id number]  Account: [de-identified]                     : 1960  Discharge Date: 22      Disposition: home    Met with patient to discuss transitional planning. Plan is home independently. Patient to receive IV antibiotics with his outpatient dialysis treatments and scripts were faxed to [facility] - patient is aware. Patient will need cab ride home. Bedside RN to contact house supervisor this evening to arrange cab ride as patient still needs his IV antibiotics prior to leaving tonight.  Patient agreeable to plan    Delray Dakin, RN

## 2022-06-02 NOTE — PROGRESS NOTES
[facility]  Internal Medicine Teaching Residency Program  Inpatient Daily Progress Note  ______________________________________________________________________________    Patient: Shaan Berger  YOB: 1960   [facility]:[id number]    Acct: [de-identified]     Room: [facility]  Admit date: 5/31/2022  Today's date: 06/02/22  Number of days in the hospital: 2    SUBJECTIVE   Admitting Diagnosis: Maricarmen-rectal abscess  CC: Rectal pain  Pt examined at bedside. Chart & results reviewed. Patient rectal pain is improved significantly. Blood pressure is 130/60. Heart rate 87. Remained afebrile. Saturating well on room air. Labs reviewed and evaluated. Leukocytosis improving 10.7-14.5. Hemoglobin 9.4  Sodium 137 potassium 4.1 chloride 94. BUN 40, creatinine 11.88 on TTS hemodialysis. proBNP 1685. Troponin 123-118. ROS:  Constitutional:  negative for chills, fevers, sweats  Respiratory:  negative for cough, dyspnea on exertion, hemoptysis, shortness of breath, wheezing  Cardiovascular:  negative for chest pain, chest pressure/discomfort, lower extremity edema, palpitations  Gastrointestinal: Positive for rectal pain. Negative for abdominal pain, constipation, diarrhea, nausea, vomiting  Neurological:  negative for dizziness, headache  BRIEF HISTORY     Patient is 62-year-old male with a past medical history significant for ESRD on right femoral Pj hemodialysis-TTS schedule, nonischemic cardiomyopathy with ejection fraction 40 to 45% (ECHO 2/28/22) s/p AICD COPD not on home oxygen, paroxysmal atrial fibrillation on Lopressor, not on anticoagulant (due to bleeding catheter site), previous history of diabetes mellitus recent HbA1c 5.4, essential hypertension presented with rectal pain and generalized fatigue. Patient admits to significant weakness and fatigue recently.   In the ED patient was found to have low-grade fever and significant leukocytosis with lactic acidosis. CT abdomen was done which showed 2.2 cm x 1.7 cm x 3.5 cm perianal abscess with mid to lower rectum on thickening likely from proctitis. Neurosurgery was taken on board and they recommend medical management with IV antibiotic, ruled out necrotizing fasciitis. Infectious disease taken on board and patient is started on IV vancomycin cefepime and Flagyl and cultures have been sent. Nephrology was taken on board for ESRD and need of hemodialysis. Of note, he was taking Eliquis in the past for atrial fibrillation but started having significant bleeding from the femoral site-continue catheter and he was advised to stop Eliquis. He did mention that his previous fistulas were malfunctioned and he underwent recent reconstruction of fistula in his left arm which is yet to mature. His previous dialysis catheter were removed due to infection concern x2. OBJECTIVE     Vital Signs:  /70   Pulse 81   Temp 97.9 °F (36.6 °C)   Resp 20   Ht 6' 1\" (1.854 m)   Wt 271 lb 2.7 oz (123 kg)   SpO2 97%   BMI 35.78 kg/m²     Temp (24hrs), Av.3 °F (36.8 °C), Min:97.9 °F (36.6 °C), Max:99.5 °F (37.5 °C)    In: 400   Out: 0     PHYSICAL EXAMINATION:  Constitutional: This is a well developed, well nourished, 35-39.9 - Obesity Grade II 62y.o. year old male who is alert, oriented, cooperative and in no apparent distress. Head:normocephalic and atraumatic. EENT:  PERRLA. No conjunctival injections. Septum was midline, mucosa was without erythema, exudates or cobblestoning. No thrush was noted. Neck: Supple without thyromegaly. No elevated JVP. Trachea was midline. Respiratory: Chest was symmetrical without dullness to percussion. Breath sounds bilaterally were clear to auscultation. There were no wheezes, rhonchi or rales. There is no intercostal retraction or use of accessory muscles. No egophony noted. Mildly increased work of breathing.   Cardiovascular: Regular without murmur, clicks, gallops or rubs. Abdomen: Slightly rounded and soft without organomegaly. No rebound, rigidity or guarding was appreciated. Lymphatic: No lymphadenopathy. Musculoskeletal: Normal curvature of the spine. No gross muscle weakness. Extremities:  No lower extremity edema, ulcerations, tenderness, varicosities or erythema. Muscle size, tone and strength are normal.  No involuntary movements are noted. Right femoral catheter noted. Has failed fistulas on the left arm and right arm. Skin:  Warm and dry. Good color, turgor and pigmentation. No lesions or scars. No cyanosis or clubbing. Neurological/Psychiatric: The patient's general behavior, level of consciousness, thought content and emotional status is normal.   Anorectal: No external abnormalities noted, no wounds, no fluctuance or induration.     Medications:  Scheduled Medications:    vancomycin  1,750 mg IntraVENous Once per day on Tue Thu Sat    apixaban  5 mg Oral BID    losartan  50 mg Oral Daily    [Held by provider] amLODIPine  5 mg Oral Daily    aspirin  81 mg Oral Daily    b complex-C-folic acid  1 mg Oral Daily    cinacalcet  60 mg Oral Once per day on Tue Thu Sat    gabapentin  300 mg Oral Nightly    lanthanum  1,000 mg Oral TID WC    methocarbamol  750 mg Oral TID    epoetin que-epbx  5,000 Units IntraVENous Once per day on Tue Thu Sat    metoprolol succinate  50 mg Oral Daily    sodium chloride flush  5-40 mL IntraVENous 2 times per day    famotidine  20 mg Oral Daily    metroNIDAZOLE  500 mg Oral 3 times per day    cefepime  1,000 mg IntraVENous Q24H    vancomycin (VANCOCIN) intermittent dosing (placeholder)   Other RX Placeholder     Continuous Infusions:    sodium chloride       PRN Medicationsmidodrine, 10 mg, BID PRN  sodium chloride flush, 5-40 mL, PRN  sodium chloride, , PRN  acetaminophen, 650 mg, Q6H PRN   Or  acetaminophen, 650 mg, Q6H PRN  albuterol, 2.5 mg, Q4H PRN  sodium chloride, 250 mL, PRN  sodium atrophic kidneys with numerous simple appearing cysts, potentially sequelae of chronic hemodialysis. The cysts are incompletely evaluated with complicated cysts and solid neoplasms not excluded. Recommend further evaluation with renal protocol abdomen MRI (preferred) or CT on a nonemergent basis. XR CHEST PORTABLE    Result Date: 5/31/2022  Left-sided HERO catheter with the tip over the proximal right atrium. No pneumothorax is identified. The elevated right hemidiaphragm is redemonstrated likely with some basilar atelectasis/scarring. The remaining lungs do not have acute process. Stent over the right subclavian region is stable with narrowing of its midportion. ASSESSMENT & PLAN     ASSESSMENT / PLAN:   Impression  Patient is a 58-year-old male with a past medical history significant for ESRD on hemodialysis, placement atrial fibrillation not on anticoagulant, nonischemic cardiomyopathy status post AICD with ejection fraction 35 to 40%, essential hypertension, COPD not on home oxygen presented with rectal pain and found to have perirectal abscess. Started on IV vancomycin cefepime and Flagyl as per ID. General surgery on board and recommend medical management with IV antibiotics. Principal Problem:  Perirectal abscess:  Plan:   Started on IV vancomycin and Zosyn and Flagyl. Infectious disease on board, follow-up on the recommendation. General surgery consulted for possible drainage. They recommend medical management with IV antibiotics. Concern for necrotizing fasciitis is ruled out as per general surgery. Leukocytosis improving. Blood cultures are negative so far. Patient is improving symptomatically. Will dc patient on iv ABx, ID will reconcile meds for discharge. Active Problems:  ESRD on HD:  Currently dialyzing with a right femoral Pj catheter.   He did mention that his previous fistulas were malfunctioned and he underwent recent reconstruction of fistula in his left arm which is yet to mature. His previous dialysis catheter were removed due to infection concern x2  Eliquis for atrial fibrillation was held due to bleeding from the [place] catheter site. Paroxysmal atrial fibrillation : [id number] score 3  Rate controlled with Lopressor. He was previously started Eliquis but was held due to significant bleeding from the [place] catheter site. We restarted -Elliquis yesterday with close monitoring for bleeding. Congestive heart failure with reduced ejection fraction 40%:  Patient home medication include losartan 50 mg, Toprol-XL 50 mg. As per GDMT, his heart failure medication need to be optimized in the background of essential hypertension. Will need SGLT2, sacubitril/valsartan instead of losartan. We will continue with Toprol-XL. Controlled type 2 diabetes mellitus with chronic kidney disease on chronic dialysis, without long-term current use of insulin ([facility] [address]  Plan:   Patient did have a history of longstanding diabetes mellitus but has been off the antidiabetic medication for the last 6 years. Seems to be burnout diabetes. Currently does not require any antidiabetic medication. Anemia of chronic disease  Plan:   Retacrit 5000 units IV 3 times daily. Continue with folic acid and vitamin B complex. DVT ppx : Heparin 5000 units 3 times daily. GI ppx: Pepcid 20 mg p.o. PT/OT: Consulted  Discharge Planning / SW: Ongoing    Malcolm Brito MD  Internal Medicine Resident, PGY-1  [address], [place]  6/2/2022, 1:45 PM

## 2022-06-02 NOTE — PROGRESS NOTES
Renal Progress Note    Patient :  Mohsen Haro; 62 y.o. MRN# [id number]  Location:  [id number]  Attending:  Viral Odom MD  Admit Date:  5/31/2022   Hospital Day: 2    Subjective:     Patient was seen and examined on HD. Tolerating the procedure well. No new issues reported overnight. Patient is awake alert and oriented x3. No apparent distress. Mentions that his perianal pain is much improved. No surgical intervention required as per surgery as well. On IV antibiotics per ID. Brief history:  58-year-old male with past medical history of ESRD on dialysis TTS schedule, diabetes mellitus type 2, hypertension, CAD with ischemic cardiomyopathy status post AICD, paroxysmal atrial fibrillation presented to the ER with chief complaint of pain in the anal region for 1 day. Pain started spontaneously and is constant, progressively worsening. Associated with generalized weakness. Denies any trauma, fever, chills, diaphoresis, shortness of breath, chest pain, dizziness, lightheadedness, abdominal pain. Denies any previous similar complaints. WBC 17.5, Hb 9.9, lactic acid 3.0. CT abdomen pelvis with contrast demonstrated 2.2 cm x 1.7 cm x 3.5 cm perianal abscess with mid to lower rectum wall thickening likely from proctitis. Inferior mesenteric and bilateral mesorectal lymph nodes prominent. Concern for necrotizing fasciitis. Malignancy needs to be ruled out. Outpatient Medications:     Medications Prior to Admission: amLODIPine (NORVASC) 5 MG tablet, Take 1 tablet by mouth daily  B Complex-C-Folic Acid (LOLA-GRACE) TABS, TAKE 1 TABLET BY MOUTH EVERY EVENING  gabapentin (NEURONTIN) 300 MG capsule, Take 300 mg by mouth daily as needed.    Methoxy PEG-Epoetin Beta (MIRCERA) 30 MCG/0.3ML SOSY, Inject 30 mcg as directed every 14 days Patient takes this with dialysis every 2 weeks  Cholecalciferol (VITAMIN D3) 1.25 MG (45224 UT) CAPS, Take 1.25 capsules by mouth See Admin Instructions Patient takes this medication 3 times a week with dialysis  cinacalcet (SENSIPAR) 60 MG tablet, 60 mg every  and Saturday with dialysis (Patient taking differently: Take 60 mg by mouth three times a week 60 mg every  and Saturday with dialysis)  methocarbamol (ROBAXIN) 750 MG tablet, Take 750 mg by mouth 3 times daily as needed   lanthanum (FOSRENOL) 1000 MG chewable tablet, Take 1,000 mg by mouth 3 times daily (with meals)   metoprolol succinate (TOPROL XL) 50 MG extended release tablet, Take 50 mg by mouth daily   aspirin 81 MG chewable tablet, Take 81 mg by mouth daily  midodrine (PROAMATINE) 10 MG tablet, Take 10 mg by mouth 2 times daily as needed (at dialysis as needed)     Current Medications:     Scheduled Meds:    vancomycin  1,750 mg IntraVENous Once per day on  Sat    apixaban  5 mg Oral BID    losartan  50 mg Oral Daily    [Held by provider] amLODIPine  5 mg Oral Daily    aspirin  81 mg Oral Daily    b complex-C-folic acid  1 mg Oral Daily    cinacalcet  60 mg Oral Once per day on  Sat    gabapentin  300 mg Oral Nightly    lanthanum  1,000 mg Oral TID WC    methocarbamol  750 mg Oral TID    epoetin que-epbx  5,000 Units IntraVENous Once per day on  Sat    metoprolol succinate  50 mg Oral Daily    sodium chloride flush  5-40 mL IntraVENous 2 times per day    famotidine  20 mg Oral Daily    metroNIDAZOLE  500 mg Oral 3 times per day    cefepime  1,000 mg IntraVENous Q24H    vancomycin (VANCOCIN) intermittent dosing (placeholder)   Other RX Placeholder     Continuous Infusions:    sodium chloride       PRN Meds:  midodrine, sodium chloride flush, sodium chloride, acetaminophen **OR** acetaminophen, albuterol, sodium chloride, sodium chloride, heparin (porcine), heparin (porcine)    Input/Output:       I/O last 3 completed shifts:   In: 600 [P.O.:300]  Out: 2100     Vital Signs:   Temperature:  Temp: 97.9 °F (36.6 °C)  TMax:   Temp (24hrs), Av.2 °F (36.8 °C), Min:97.3 °F (36.3 °C), Max:99.5 °F (37.5 °C)    Respirations:  Resp: 16  Pulse:   Heart Rate: 87  BP:    BP: 130/66  BP Range: Systolic (14TNJ), CHRISTIE:188 , Min:117 , HFW:084       Diastolic (12DJS), WQE:04, Min:50, Max:90      Physical Examination:     General:          AAO x 3, speaking in full sentences, no accessory muscle use. HEENT:           Atraumatic, normocephalic, no throat congestion, moist mucosa. Eyes:               Pupils equal, round and reactive to light, EOMI. Neck:               No JVD, no thyromegaly, no lymphadenopathy. Chest:   Bilateral vesicular breath sounds, no rales or wheezes. Cardiac:           S1 S2 RR, no murmurs, gallops or rubs, JVP not raised. Abdomen:        Soft, non-tender, no masses or organomegaly, BS audible. :                  No suprapubic or flank tenderness. Rectal examination not performed. Neuro:             AAO x 3, No FND. SKIN:               No rashes, good skin turgor. Extremities:      No edema. Labs:       Recent Labs     05/31/22  0537 06/01/22  0538 06/02/22  0513   WBC 17.5* 14.5* 10.7   RBC 3.30* 2.98* 3.14*   HGB 9.9* 9.0* 9.4*   HCT 31.5* 27.3* 29.4*   MCV 95.5 91.6 93.6   MCH 30.0 30.2 29.9   MCHC 31.4 33.0 32.0   RDW 14.9* 15.1* 15.1*    183 187   MPV 10.9 10.9 10.9      BMP:   Recent Labs     05/31/22  0537 06/01/22  0538 06/02/22  0513    134* 137   K 4.3 4.5 4.1   CL 91* 93* 94*   CO2 25 26 25   BUN 34* 27* 40*   CREATININE 12.28* 9.49* 11.88*   GLUCOSE 69* 66* 92   CALCIUM 9.4 9.2 9.4     Albumin:    Recent Labs     05/31/22  0537   LABALBU 3.7     BNP:      Lab Results   Component Value Date    BNP NOT REPORTED 08/21/2014     SPEP:  Lab Results   Component Value Date    PROT 7.7 05/31/2022     Assessment:     1. ESRD secondary to diabetic nephrosclerosis on Hemodialysis.  His regular HD days are Tuesday, Thursday and Saturday at [facility] hemodialysis facility using right femoral tunneled catheter under  Alyce Rings.  His dry weight is 123.5 kg.   2.    Perirectal abscess, proctitis with regional lymphadenopathy. Malignancy to be ruled out. 3.  Anemia of chronic disease  4. Diabetes mellitus type 2  5. Essential hypertension  6. Coronary artery disease with ischemic cardiomyopathy (EF 40 to 45%) s/p AICD  7. Paroxysmal atrial fibrillation    Plan:   1. Patient was seen and examined on HD at bedside. Orders were confirmed with the HD nurse. 2.  Strict inputs and outputs. Daily weights and document in the chart. 3.  Fluid restriction with low phosphate and low potassium diet. 4.  Continue antibiotics per ID with renal dosing. 5.  Okay to be discharged from nephrology standpoint postdialysis today. Nutrition   Please ensure that patient is on a renal diet/TF. Avoid nephrotoxic drugs/contrast exposure. We will continue to follow along with you. Vineet Ruiz MD  [facility]     This note is created with the assistance of a speech-recognition program. While intending to generate a document that actually reflects the content of the visit, no guarantees can be provided that every mistake has been identified and corrected by editing.

## 2022-06-02 NOTE — PROGRESS NOTES
CLINICAL PHARMACY NOTE: MEDS TO BEDS    Total # of Prescriptions Filled: 3   The following medications were delivered to the patient:  · eliquis  · Metronidazole  · entresto    Additional Documentation:  Patient received counseling by Trino Bullard, PharmD, regarding discharge medications. All questions answered.

## 2022-06-02 NOTE — PROGRESS NOTES
Dialysis Post Treatment Note  Vitals:    06/02/22 1445   BP: 105/64   Pulse: 84   Resp: 16   Temp: 98.3 °F (36.8 °C)   SpO2:      Pre-Weight = 123 kg  Post-weight = Weight: 268 lb 8.3 oz (121.8 kg)  Total Liters Processed = Total Liters Processed (l/min): 87 l/min  Rinseback Volume (mL) = Rinseback Volume (ml): 300 ml  Net Removal (mL) =  1000  Patient's dry weight= 123.5kg  Type of access used= CVC  Length of treatment=210    Tolerated treatment well. No concerns. Catheter flushes and aspirates well. Heparinized and capped.

## 2022-06-05 LAB
CULTURE: NORMAL
CULTURE: NORMAL
Lab: NORMAL
Lab: NORMAL
SPECIMEN DESCRIPTION: NORMAL
SPECIMEN DESCRIPTION: NORMAL

## 2022-06-08 NOTE — DISCHARGE SUMMARY
[address]     Department of Internal Medicine - Staff Internal Medicine Teaching Service    INPATIENT DISCHARGE SUMMARY      Patient Identification:  Yasmine Lennon is a 62 y.o. male. :  1960  MRN: [id number]     Acct: [de-identified]   PCP: No primary care provider on file. Admit Date:  2022  Discharge date and time: 2022  7:02 PM   Attending Provider: No att. providers found                                     [address] Problem Lists:  Principal Problem:    Maricarmen-rectal abscess  Active Problems:    Controlled type 2 diabetes mellitus with chronic kidney disease on chronic dialysis, without long-term current use of insulin (HCC)    Anemia of chronic disease    Hypoglycemia    Pulmonary hyperinflation    Biventricular CHF (congestive heart failure) ([facility]) with icd chronic     Paroxysmal atrial fibrillation ([facility])    Lactic acidosis    ESRD on dialysis ([facility]    Leukocytosis    CAD (coronary artery disease)    Perianal abscess    Proctitis    Necrotizing fasciitis ([facility]  Resolved Problems:    * No resolved hospital problems. *      HOSPITAL STAY     Brief Inpatient course: Yasmine Lennon is a 62 y.o. male who was admitted for the management of Maricarmen-rectal abscess, presented to the emergency department with rectal pain and low-grade fever. He was found to have 2.2 cm x 1.7 cm x 3.5 cm perianal abscess with mid to lower rectum on thickening likely from proctitis on CT abdomen. General surgery was consulted and they recommend broad-spectrum IV antibiotic. Also ruled out necrotizing fasciitis and hence did not require any surgery. Infectious disease were taken on board and patient was started on IV vancomycin cefepime and Flagyl and cultures were sent. Over the course of admission patient clinical condition improved significantly, leukocytosis was resolving and rectal pain resolved.   Given the fact that patient was having atrial fibrillation with high [id number] score and he was not on anticoagulant due to bleeding from the [address] catheter site. Patient was started on Eliquis with close monitoring for bleeding. Patient tolerated Eliquis well and instructed to follow-up at PCP and cardiology clinic. He was also dialyzed by right femoral Pj catheter is fistula was yet to mature. Nephrology was following. Infectious disease, nephrology cleared him for discharge and patient is sent home on IV antibiotics. Procedures/ Significant Interventions:    Hemodialysis. Consults:     Consults:     Final Specialist Recommendations/Findings:   IP CONSULT TO NEPHROLOGY  IP CONSULT TO GENERAL SURGERY  IP CONSULT TO INTERNAL MEDICINE  IP CONSULT TO CASE MANAGEMENT  PHARMACY TO DOSE VANCOMYCIN  IP CONSULT TO INFECTIOUS DISEASES      Any Hospital Acquired Infections: none    Discharge Functional Status:  stable    DISCHARGE PLAN     Disposition: home    Patient Instructions:   Discharge Medication List as of 6/2/2022  6:41 PM      START taking these medications    Details   sacubitril-valsartan (ENTRESTO) 24-26 MG per tablet Take 0.5 tablets by mouth 2 times daily, Disp-60 tablet, R-1Normal      apixaban (ELIQUIS) 5 MG TABS tablet Take 1 tablet by mouth 2 times daily, Disp-180 tablet, R-1Normal      cefepime (MAXIPIME) infusion Infuse 2,000 mg intravenously three times a week for 14 days Compound per protocol, Disp-12 g, R-0Print      vancomycin (VANCOCIN) infusion Infuse 1,000 mg intravenously three times a week for 14 days Compound per protocol., Disp-6 g, R-0Print      metroNIDAZOLE (FLAGYL) 500 MG tablet Take 1 tablet by mouth every 8 hours for 14 days, Disp-42 tablet, R-0Normal         CONTINUE these medications which have NOT CHANGED    Details   B Complex-C-Folic Acid (LOLA-GRACE) TABS TAKE 1 TABLET BY MOUTH EVERY EVENING, Disp-30 tablet, R-4Normal      gabapentin (NEURONTIN) 300 MG capsule Take 300 mg by mouth daily as needed.  Historical Med Methoxy PEG-Epoetin Beta (MIRCERA) 30 MCG/0.3ML SOSY Inject 30 mcg as directed every 14 days Patient takes this with dialysis every 2 weeksHistorical Med      Cholecalciferol (VITAMIN D3) 1.25 MG (10845 UT) CAPS Take 1.25 capsules by mouth See Admin Instructions Patient takes this medication 3 times a week with dialysisHistorical Med      cinacalcet (SENSIPAR) 60 MG tablet 60 mg every Tuesday Thursday and Saturday with dialysis, Disp-30 tablet,R-3Normal      methocarbamol (ROBAXIN) 750 MG tablet Take 750 mg by mouth 3 times daily as needed Historical Med      lanthanum (FOSRENOL) 1000 MG chewable tablet Take 1,000 mg by mouth 3 times daily (with meals) , R-11Historical Med      metoprolol succinate (TOPROL XL) 50 MG extended release tablet Take 50 mg by mouth daily , R-2Historical Med      aspirin 81 MG chewable tablet Take 81 mg by mouth daily      midodrine (PROAMATINE) 10 MG tablet Take 10 mg by mouth 2 times daily as needed (at dialysis as needed) Historical Med         STOP taking these medications       amLODIPine (NORVASC) 5 MG tablet Comments:   Reason for Stopping:               Activity: activity as tolerated    Diet: cardiac diet and renal diet    Follow-up:    Mitchel Mobley  [address] [place] [id number] [phone]    In 2 weeks      [address] [place] [address] [address], [address] [address]    In 1 week  perirectal abscess      Patient Instructions: You have been admitted with perianal abscess and treated with intravenous antibiotics as per infectious disease recommendation. General surgery did not recommend any surgical procedure for the abscess drainage. You will need to take IV and p.o. antibiotic as prescribed. Please take your medicines regularly and follow up with Infectious Diseases outpatient clinic.   We also started you on Eliquis due to high risk of blood clots and stroke due to atrial fibrillation with the recommendation to closely monitor for bleeding. In case of bleeding from dialysis catheter site please seek medical attention and come to the emergency department. Please follow-up with PCP, vascular surgery clinic and cardiology clinic and discussed about Eliquis resumption versus discontinuation. You have been started on a new medication for heart failure named Entresto. Please take as prescribed and follow up with your Cardiologist at the earliest possible appointment. Also follow-up at nephrology clinic for regular ESRD. In case of worsening symptoms, seek medical attention and come to the emergency department. Follow up labs: None  Follow up imaging: None    Note that over 30 minutes was spent in preparing discharge papers, discussing discharge with patient, medication review, etc.      Leela Pimentel MD,   Internal Medicine Resident, PGY-1  [facility];  [place], [place]  6/8/2022, 10:27 AM

## 2022-06-28 ENCOUNTER — HOSPITAL ENCOUNTER (OUTPATIENT)
Age: 62
Setting detail: OBSERVATION
Discharge: HOME | End: 2022-06-29
Attending: EMERGENCY MEDICINE | Admitting: EMERGENCY MEDICINE
Payer: COMMERCIAL

## 2022-06-28 DIAGNOSIS — T82.9XXA COMPLICATION FROM RENAL DIALYSIS DEVICE, INITIAL ENCOUNTER: Primary | ICD-10-CM

## 2022-06-28 LAB
ABSOLUTE EOS #: 0.35 K/UL (ref 0–0.44)
ABSOLUTE IMMATURE GRANULOCYTE: <0.03 K/UL (ref 0–0.3)
ABSOLUTE LYMPH #: 1.85 K/UL (ref 1.1–3.7)
ABSOLUTE MONO #: 1.2 K/UL (ref 0.1–1.2)
ANION GAP SERPL CALCULATED.3IONS-SCNC: 13 MMOL/L (ref 9–17)
BASOPHILS # BLD: 0 % (ref 0–2)
BASOPHILS ABSOLUTE: <0.03 K/UL (ref 0–0.2)
BUN BLDV-MCNC: 14 MG/DL (ref 8–23)
CALCIUM SERPL-MCNC: 8.9 MG/DL (ref 8.6–10.4)
CHLORIDE BLD-SCNC: 93 MMOL/L (ref 98–107)
CO2: 33 MMOL/L (ref 20–31)
CREAT SERPL-MCNC: 6.74 MG/DL (ref 0.7–1.2)
EOSINOPHILS RELATIVE PERCENT: 5 % (ref 1–4)
GFR AFRICAN AMERICAN: 10 ML/MIN
GFR NON-AFRICAN AMERICAN: 8 ML/MIN
GFR SERPL CREATININE-BSD FRML MDRD: ABNORMAL ML/MIN/{1.73_M2}
GLUCOSE BLD-MCNC: 94 MG/DL (ref 70–99)
HCT VFR BLD CALC: 35.7 % (ref 40.7–50.3)
HEMOGLOBIN: 11.4 G/DL (ref 13–17)
IMMATURE GRANULOCYTES: 0 %
INR BLD: 1.1
LYMPHOCYTES # BLD: 24 % (ref 24–43)
MAGNESIUM: 1.8 MG/DL (ref 1.6–2.6)
MCH RBC QN AUTO: 31.1 PG (ref 25.2–33.5)
MCHC RBC AUTO-ENTMCNC: 31.9 G/DL (ref 28.4–34.8)
MCV RBC AUTO: 97.3 FL (ref 82.6–102.9)
MONOCYTES # BLD: 16 % (ref 3–12)
NRBC AUTOMATED: 0 PER 100 WBC
PARTIAL THROMBOPLASTIN TIME: 114 SEC (ref 20.5–30.5)
PARTIAL THROMBOPLASTIN TIME: 33.1 SEC (ref 20.5–30.5)
PDW BLD-RTO: 17.9 % (ref 11.8–14.4)
PLATELET # BLD: ABNORMAL K/UL (ref 138–453)
PLATELET, FLUORESCENCE: 122 K/UL (ref 138–453)
PLATELET, IMMATURE FRACTION: 8.1 % (ref 1.1–10.3)
POTASSIUM SERPL-SCNC: 3.5 MMOL/L (ref 3.7–5.3)
PROTHROMBIN TIME: 12 SEC (ref 9.1–12.3)
RBC # BLD: 3.67 M/UL (ref 4.21–5.77)
RBC # BLD: ABNORMAL 10*6/UL
SARS-COV-2, RAPID: NOT DETECTED
SEG NEUTROPHILS: 55 % (ref 36–65)
SEGMENTED NEUTROPHILS ABSOLUTE COUNT: 4.24 K/UL (ref 1.5–8.1)
SODIUM BLD-SCNC: 139 MMOL/L (ref 135–144)
SPECIMEN DESCRIPTION: NORMAL
WBC # BLD: 7.7 K/UL (ref 3.5–11.3)

## 2022-06-28 PROCEDURE — 80048 BASIC METABOLIC PNL TOTAL CA: CPT

## 2022-06-28 PROCEDURE — 85025 COMPLETE CBC W/AUTO DIFF WBC: CPT

## 2022-06-28 PROCEDURE — 83735 ASSAY OF MAGNESIUM: CPT

## 2022-06-28 PROCEDURE — 85055 RETICULATED PLATELET ASSAY: CPT

## 2022-06-28 PROCEDURE — 6370000000 HC RX 637 (ALT 250 FOR IP): Performed by: STUDENT IN AN ORGANIZED HEALTH CARE EDUCATION/TRAINING PROGRAM

## 2022-06-28 PROCEDURE — G0378 HOSPITAL OBSERVATION PER HR: HCPCS

## 2022-06-28 PROCEDURE — 99285 EMERGENCY DEPT VISIT HI MDM: CPT

## 2022-06-28 PROCEDURE — 85730 THROMBOPLASTIN TIME PARTIAL: CPT

## 2022-06-28 PROCEDURE — 87635 SARS-COV-2 COVID-19 AMP PRB: CPT

## 2022-06-28 PROCEDURE — 85610 PROTHROMBIN TIME: CPT

## 2022-06-28 RX ORDER — AMLODIPINE BESYLATE 10 MG/1
10 TABLET ORAL ONCE
Status: COMPLETED | OUTPATIENT
Start: 2022-06-28 | End: 2022-06-28

## 2022-06-28 RX ADMIN — AMLODIPINE BESYLATE 10 MG: 10 TABLET ORAL at 22:21

## 2022-06-28 ASSESSMENT — ENCOUNTER SYMPTOMS
NAUSEA: 0
DIARRHEA: 0
CONSTIPATION: 0
ABDOMINAL PAIN: 0
BACK PAIN: 0
SORE THROAT: 0
SHORTNESS OF BREATH: 0
VOMITING: 0
RHINORRHEA: 0
COUGH: 0

## 2022-06-28 NOTE — ED PROVIDER NOTES
[facility] ED  Emergency Department Encounter  Emergency Medicine Resident     Pt Name: Luz Elena Denise  MRN: [id number]  [place] 1960  Date of evaluation: 6/28/22  PCP:  No primary care provider on file. CHIEF COMPLAINT       Fistula complication    HISTORY OFPRESENT ILLNESS  (Location/Symptom, Timing/Onset, Context/Setting, Quality, Duration, Modifying Factors,Severity.)      Luz Elena Denise is a 62 y.o. male with past medical history of atrial fibrillation, coronary artery disease, CHF with AICD, diabetes, end-stage renal disease on dialysis Tuesday, Thursday, Saturday who presents with bleeding from his left upper extremity fistula. Patient states that he went to dialysis today and was able to get nearly a full treatment. He said they had to stop approximately 1 hour early due to the machine clotting. He said there were no issues with his fistula at that time. He did not have bleeding at that time. At approximately 1 PM today patient states that the fistula spontaneously began bleeding. He denies any trauma to the area. He states he tried to hold pressure and wrapped the arm with a bandage but he continued to bleed through so he called EMS. On EMS arrival, they wrapped the arm but he is continue to bleed through this dressing so he presented to the emergency department. Patient reports that he was previously taking Eliquis and a baby aspirin. Patient states he was taken off of the Eliquis by his doctor due to nosebleeds. He states he did take 1 tab of Eliquis last Thursday. He denies any other symptoms of bleeding. He is otherwise been feeling well. Fistula was placed earlier this month at [facility].   Patient goes to dialysis facilities daily on [address] / SURGICAL / SOCIAL / FAMILY HISTORY      has a past medical history of A-V fistula ([facility] [place] [address] AICD (automatic cardioverter/defibrillator) present, Asthma, Atrial fibrillation ([address] Blood transfusion reaction, CAD (coronary artery disease), CHF (congestive heart failure) ([facility] CHF (congestive heart failure) ([facility] [address] COPD (chronic obstructive pulmonary disease) ([facility] [address] [facility] CRF (chronic renal failure), Diabetes mellitus ([facility] Dialysis patient ([facility] [facility] GERD (gastroesophageal reflux disease), Hemodialysis patient ([facility] Hypertension, and Obesity. has a past surgical history that includes vascular surgery; Cardiac defibrillator placement (2006); pacemaker placement; and IR TUNNELED CVC PLACE WO SQ PORT/PUMP > 5 YEARS (2/28/2022). Social:  reports that he has never smoked. He has never used smokeless tobacco. He reports that he does not drink alcohol and does not use drugs. Family Hx:   Family History   Adopted: Yes        Allergies:  Spironolactone    Home Medications:  Prior to Admission medications    Medication Sig Start Date End Date Taking? Authorizing Provider   sacubitril-valsartan (ENTRESTO) 24-26 MG per tablet Take 0.5 tablets by mouth 2 times daily 6/2/22   Simba Morton MD   apixaban (ELIQUIS) 5 MG TABS tablet Take 1 tablet by mouth 2 times daily 6/1/22   Simba Morton MD   B Complex-C-Folic Acid (LOLA-GRACE) TABS TAKE 1 TABLET BY MOUTH EVERY EVENING 3/4/22   Jose C Marquez DO   gabapentin (NEURONTIN) 300 MG capsule Take 300 mg by mouth daily as needed.      Historical Provider, MD   Methoxy PEG-Epoetin Beta (MIRCERA) 30 MCG/0.3ML SOSY Inject 30 mcg as directed every 14 days Patient takes this with dialysis every 2 weeks    Historical Provider, MD   Cholecalciferol (VITAMIN D3) 1.25 MG (65260 UT) CAPS Take 1.25 capsules by mouth See Admin Instructions Patient takes this medication 3 times a week with dialysis    Historical Provider, MD   cinacalcet (SENSIPAR) 60 MG tablet 60 mg every Tuesday Thursday and Saturday with dialysis  Patient taking differently: Take 60 mg by mouth three times a week 60 mg every Tuesday Thursday and Saturday with dialysis 10/21/20   Matty Fry MD   methocarbamol (ROBAXIN) 750 MG tablet Take 750 mg by mouth 3 times daily as needed     Historical Provider, MD   lanthanum (FOSRENOL) 1000 MG chewable tablet Take 1,000 mg by mouth 3 times daily (with meals)  7/15/19   Historical Provider, MD   metoprolol succinate (TOPROL XL) 50 MG extended release tablet Take 50 mg by mouth daily  7/3/19   Historical Provider, MD   aspirin 81 MG chewable tablet Take 81 mg by mouth daily    Historical Provider, MD   midodrine (PROAMATINE) 10 MG tablet Take 10 mg by mouth 2 times daily as needed (at dialysis as needed)     Historical Provider, MD       REVIEW OFSYSTEMS    (2-9 systems for level 4, 10 or more for level 5)      Review of Systems   Constitutional: Negative for chills and fever. HENT: Negative for congestion, rhinorrhea and sore throat. Respiratory: Negative for cough and shortness of breath. Cardiovascular: Negative for chest pain and leg swelling. Gastrointestinal: Negative for abdominal pain, constipation, diarrhea, nausea and vomiting. Genitourinary: Negative for decreased urine volume, difficulty urinating and hematuria. Musculoskeletal: Negative for arthralgias, back pain and neck pain. Skin: Positive for wound. Negative for rash. Neurological: Negative for dizziness, numbness and headaches. Hematological: Bruises/bleeds easily. All other systems reviewed and are negative. PHYSICAL EXAM   (up to 7 for level 4, 8 or more forlevel 5)      INITIAL VITALS:   Vitals:    06/28/22 1757 06/28/22 2221 06/29/22 0127 06/29/22 0340   BP:  (!) 153/94 137/74 104/63   Pulse:   71 88   Resp:   18 16   Temp: 98.2 °F (36.8 °C)  98 °F (36.7 °C) 97.5 °F (36.4 °C)   TempSrc:   Oral Oral   SpO2:   99% 98%        Physical Exam  Vitals and nursing note reviewed. Constitutional:       General: He is not in acute distress. Appearance: He is not toxic-appearing. Comments: Adult male sitting in stretcher no acute distress.   Speaks in full sentences and answers questions appropriately   HENT: Head: Normocephalic and atraumatic. Nose: Nose normal.      Mouth/Throat:      Mouth: Mucous membranes are moist.      Pharynx: Oropharynx is clear. Eyes:      Conjunctiva/sclera: Conjunctivae normal.      Pupils: Pupils are equal, round, and reactive to light. Cardiovascular:      Rate and Rhythm: Normal rate and regular rhythm. Pulses: Normal pulses. Heart sounds: No murmur heard. No friction rub. No gallop. Pulmonary:      Effort: Pulmonary effort is normal. No respiratory distress. Breath sounds: Normal breath sounds. No wheezing, rhonchi or rales. Abdominal:      General: There is no distension. Palpations: Abdomen is soft. Tenderness: There is no abdominal tenderness. Musculoskeletal:      Cervical back: Neck supple. No rigidity. Right lower leg: No edema. Left lower leg: No edema. Skin:     General: Skin is warm and dry. Capillary Refill: Capillary refill takes less than 2 seconds. Findings: No rash. Comments: There is an old nonfunctional fistula to the right upper extremity  There is a fistula to the left upper extremity with brisk active arterial bleeding which is controlled with pressure. There is a second smaller area of venous oozing just distal to this  2+ radial pulses bilaterally   Neurological:      General: No focal deficit present. Mental Status: He is alert. Psychiatric:         Mood and Affect: Mood normal.         Behavior: Behavior normal.         DIFFERENTIAL  DIAGNOSIS     DDX: Fistula complication    Initial MDM/Plan: 62 y.o. male with past medical history of atrial fibrillation, coronary artery disease, CHF with AICD, diabetes, end-stage renal disease on dialysis Tuesday, Thursday, Saturday who presents with bleeding from his left upper extremity fistula which began at approximately 1 PM this afternoon. On physical exam, patient is nontoxic-appearing with vital signs remarkable for hypertension.   He has brisk bleeding from the left upper extremity fistula which was controlled with a new pressure dressing. He has a small area of venous oozing. I do not appreciate a strong thrill, though would repeat this exam once bleeding more controlled. Will obtain basic labs to evaluate hemoglobin level as well as coags. Will consult with vascular surgery. DIAGNOSTIC RESULTS / EMERGENCYDEPARTMENT COURSE / MDM     LABS:  Results for orders placed or performed during the hospital encounter of 06/28/22   COVID-19, Rapid    Specimen: Nasopharyngeal Swab   Result Value Ref Range    Specimen Description . NASOPHARYNGEAL SWAB     SARS-CoV-2, Rapid Not Detected Not Detected   CBC with Auto Differential   Result Value Ref Range    WBC 7.7 3.5 - 11.3 k/uL    RBC 3.67 (L) 4.21 - 5.77 m/uL    Hemoglobin 11.4 (L) 13.0 - 17.0 g/dL    Hematocrit 35.7 (L) 40.7 - 50.3 %    MCV 97.3 82.6 - 102.9 fL    MCH 31.1 25.2 - 33.5 pg    MCHC 31.9 28.4 - 34.8 g/dL    RDW 17.9 (H) 11.8 - 14.4 %    Platelets See Reflexed IPF Result 138 - 453 k/uL    NRBC Automated 0.0 0.0 per 100 WBC    RBC Morphology ANISOCYTOSIS PRESENT     Seg Neutrophils 55 36 - 65 %    Lymphocytes 24 24 - 43 %    Monocytes 16 (H) 3 - 12 %    Eosinophils % 5 (H) 1 - 4 %    Basophils 0 0 - 2 %    Immature Granulocytes 0 0 %    Segs Absolute 4.24 1.50 - 8.10 k/uL    Absolute Lymph # 1.85 1.10 - 3.70 k/uL    Absolute Mono # 1.20 0.10 - 1.20 k/uL    Absolute Eos # 0.35 0.00 - 0.44 k/uL    Basophils Absolute <0.03 0.00 - 0.20 k/uL    Absolute Immature Granulocyte <0.03 0.00 - 0.30 k/uL   Basic Metabolic Panel w/ Reflex to MG   Result Value Ref Range    Glucose 94 70 - 99 mg/dL    BUN 14 8 - 23 mg/dL    CREATININE 6.74 (HH) 0.70 - 1.20 mg/dL    Calcium 8.9 8.6 - 10.4 mg/dL    Sodium 139 135 - 144 mmol/L    Potassium 3.5 (L) 3.7 - 5.3 mmol/L    Chloride 93 (L) 98 - 107 mmol/L    CO2 33 (H) 20 - 31 mmol/L    Anion Gap 13 9 - 17 mmol/L    GFR Non-African American 8 (L) >60 mL/min    GFR  American 10 (L) >60 mL/min    GFR Comment         Protime-INR   Result Value Ref Range    Protime 12.0 9.1 - 12.3 sec    INR 1.1    APTT   Result Value Ref Range    .0 (HH) 20.5 - 30.5 sec   Immature Platelet Fraction   Result Value Ref Range    Platelet, Immature Fraction 8.1 1.1 - 10.3 %    Platelet, Fluorescence 122 (L) 138 - 453 k/uL   Magnesium   Result Value Ref Range    Magnesium 1.8 1.6 - 2.6 mg/dL   APTT   Result Value Ref Range    PTT 33.1 (H) 20.5 - 30.5 sec         RADIOLOGY:  No results found. EKG  None      MEDICATIONS ORDERED:  Orders Placed This Encounter   Medications    amLODIPine (NORVASC) tablet 10 mg    gabapentin (NEURONTIN) capsule 300 mg       PROCEDURES:  None      CONSULTS:  IP CONSULT TO VASCULAR SURGERY      EMERGENCY DEPARTMENT COURSE:  8290: New pressure dressing appears to be working quite well. We will keep this in place until patient can be evaluated by vascular surgery. Vascular surgery has been paged. 1930: Vascular surgery has evaluated the patient. They did not take down pressure dressing, as it had only been in place for short period of time. Recommend leaving the pressure dressing in place 1 additional hour then they will come take it down and reevaluate the bleeding at that time. They report that patient does not actually have a fistula but has a hero graft which was first used today at dialysis. 2100: Dressing taken down by vascular surgery. Brisk arterial bleeding has improved but there is still significant oozing. Dressing replaced. Vascular surgery is recommending placement in the observation unit for reevaluation of the bleeding in the morning. Patient is requesting dose of Norvasc which was provided to him. His home medications do not appear to be updated in our system and will need to be updated during his admission, as Norvasc is not on his list.        FINAL IMPRESSION      1.  Complication from renal dialysis device, initial encounter DISPOSITION / PLAN     DISPOSITION admit      PATIENT REFERRED TO:  No follow-up provider specified.     DISCHARGE MEDICATIONS:  New Prescriptions    No medications on file       Maranda Duron DO  Emergency Medicine Resident    (Please note that portions of this note were completed with a voice recognition program.Efforts were made to edit the dictations but occasionally words are mis-transcribed.)        Maranda Duron DO  Resident  06/29/22 1231

## 2022-06-28 NOTE — ED PROVIDER NOTES
[address] ED     Emergency Department     Faculty Attestation        I performed a history and physical examination of the patient and discussed management with the resident. I reviewed the residents note and agree with the documented findings and plan of care. Any areas of disagreement are noted on the chart. I was personally present for the key portions of any procedures. I have documented in the chart those procedures where I was not present during the key portions. I have reviewed the emergency nurses triage note. I agree with the chief complaint, past medical history, past surgical history, allergies, medications, social and family history as documented unless otherwise noted below. For Physician Assistant/ Nurse Practitioner cases/documentation I have personally evaluated this patient and have completed at least one if not all key elements of the E/M (history, physical exam, and MDM). Additional findings are as noted. Vital Signs: BP: (!) 170/87  Heart Rate: 77  Resp: 18  Temp: 98.2 °F (36.8 °C) SpO2: 99 %  PCP:  No primary care provider on file. Pertinent Comments:     Patient 70-year-old male with history of end-stage renal disease on hemodialysis as well as intermittent Eliquis. Patient had almost full dialysis session today but then at conclusion of this did have bleeding from the fistula that has not been stopping. Brought in by EMS for evaluation. Has 2 sources of bleeding from needle clare with one only minimal venous oozing but the other with arteriole like bleeding controlled with localized pressure. Will obtain stat vascular surgery consultation  Of note, patient has a hero graft and not a fistula. Vascular surgery was at bedside and have rewrapped and will wait 1 hour and recheck. This is checked and the arteriole bleeding has clearly stopped with only very mild oozing if any.    They wish to place in observation unit and if not completely stop by morning consider OR. Critical Care  None    This patient was evaluated in the Emergency Department for symptoms described in the history of present illness. He/she was evaluated in the context of the global COVID-19 pandemic, which necessitated consideration that the patient might be at risk for infection with the SARS-CoV-2 virus that causes COVID-19. Institutional protocols and algorithms that pertain to the evaluation of patients at risk for COVID-19 are in a state of rapid change based on information released by regulatory bodies including the CDC and federal and state organizations. These policies and algorithms were followed during the patient's care in the ED. (Please note that portions of this note were completed with a voice recognition program. Efforts were made to edit the dictations but occasionally words are mis-transcribed.  Whenever words are used in this note in any gender, they shall be construed as though they were used in the gender appropriate to the circumstances; and whenever words are used in this note in the singular or plural form, they shall be construed as though they were used in the form appropriate to the circumstances.)    Lilyan Felty, MD Guillermina Ester  Attending Emergency Medicine Physician           Kristen Segura MD  06/28/22 8035       Kristen Segura MD  06/28/22 0786       Kristen Segura MD  06/28/22 231

## 2022-06-28 NOTE — CONSULTS
[address]      Patient's Name/ Date of Birth/ Gender: Olimpia Batista / 1960 (62 y.o.) / male     Referring Physician: Chuy Colby MD    Consulting Physician:Yesenia    History of present Illness: Pt is a 62 y.o. male who presents with continued bleeding from [facility] 22 site after HD today. Patient says he got home from dialysis and took his Eliquis and about an hour later the bleeding started. He tried to control it by himself but was unable to so he called EMS. They were able to get bleeding stopped so he took a nap. He woke up from his nap and the bleeding had started again. He called EMS again and they brought him in. Since in the ED a pressure dressing was placed and there has been no strike through. Past Medical History:  has a past medical history of A-V fistula ([address] AICD (automatic cardioverter/defibrillator) present, Asthma, Atrial fibrillation ([address] Blood transfusion reaction, CAD (coronary artery disease), CHF (congestive heart failure) ([address] CHF (congestive heart failure) ([address] COPD (chronic obstructive pulmonary disease) ([address] CRF (chronic renal failure), Diabetes mellitus ([address] Dialysis patient ([address] GERD (gastroesophageal reflux disease), Hemodialysis patient ([address] Hypertension, and Obesity. Past Surgical History:   Past Surgical History:   Procedure Laterality Date    CARDIAC DEFIBRILLATOR PLACEMENT  2006    IR TUNNELED CATHETER PLACEMENT GREATER THAN 5 YEARS  2/28/2022    IR TUNNELED CATHETER PLACEMENT GREATER THAN 5 YEARS 2/28/2022 [facility] SPECIAL PROCEDURES    PACEMAKER PLACEMENT      VASCULAR SURGERY      L avf       Social History:  reports that he has never smoked. He has never used smokeless tobacco. He reports that he does not drink alcohol and does not use drugs. Family History: family history is not on file. He was adopted.     Review of Systems:   General: Denies fever, chills, night sweats, weight loss, malaise, fatigue  HEENT: Denies sore throat, sinus problems, allergic rhinosinusitis  Card: Denies chest pain, palpitations, orthopnea/PND. Pulm: Denies cough, shortness of breath  GI: denies history of constipation, diarrhea, hematochezia or melena  : Denies polyuria, dysuria, hematuria  Endo: + diabetes, no thyroid problems. Heme: Denies anemia, h/o bleeding or clotting problems. Neuro: Denies h/o CVA, TIA  Skin: Denies rashes, ulcers  Musculoskeletal: Denies muscle, joint, back pain. Allergies: Spironolactone    Current Meds:No current facility-administered medications for this encounter. Current Outpatient Medications:     sacubitril-valsartan (ENTRESTO) 24-26 MG per tablet, Take 0.5 tablets by mouth 2 times daily, Disp: 60 tablet, Rfl: 1    apixaban (ELIQUIS) 5 MG TABS tablet, Take 1 tablet by mouth 2 times daily, Disp: 180 tablet, Rfl: 1    B Complex-C-Folic Acid (LOLA-GRACE) TABS, TAKE 1 TABLET BY MOUTH EVERY EVENING, Disp: 30 tablet, Rfl: 4    gabapentin (NEURONTIN) 300 MG capsule, Take 300 mg by mouth daily as needed.  , Disp: , Rfl:     Methoxy PEG-Epoetin Beta (MIRCERA) 30 MCG/0.3ML SOSY, Inject 30 mcg as directed every 14 days Patient takes this with dialysis every 2 weeks, Disp: , Rfl:     Cholecalciferol (VITAMIN D3) 1.25 MG (87049 UT) CAPS, Take 1.25 capsules by mouth See Admin Instructions Patient takes this medication 3 times a week with dialysis, Disp: , Rfl:     cinacalcet (SENSIPAR) 60 MG tablet, 60 mg every Tuesday Thursday and Saturday with dialysis (Patient taking differently: Take 60 mg by mouth three times a week 60 mg every Tuesday Thursday and Saturday with dialysis), Disp: 30 tablet, Rfl: 3    methocarbamol (ROBAXIN) 750 MG tablet, Take 750 mg by mouth 3 times daily as needed , Disp: , Rfl:     lanthanum (FOSRENOL) 1000 MG chewable tablet, Take 1,000 mg by mouth 3 times daily (with meals) , Disp: , Rfl: 11    metoprolol succinate (TOPROL XL) 50 MG extended release tablet, Take 50 mg by mouth daily , Disp: , Rfl: 2    aspirin 81 MG chewable tablet, Take 81 mg by mouth daily, Disp: , Rfl:     midodrine (PROAMATINE) 10 MG tablet, Take 10 mg by mouth 2 times daily as needed (at dialysis as needed) , Disp: , Rfl:     Vital Signs:  Vitals:    06/28/22 1757   BP:    Pulse:    Resp:    Temp: 98.2 °F (36.8 °C)   SpO2:        Physical Exam:  Vital signs and Nurse's note reviewed  Gen:  A&Ox3, NAD  HEENT: PERRLA, EOMI, no scleral icterus, oral mucosa moist  Neck: Supple  Chest/Resp: Symmetric rise with inhalation, no evidence of trauma  CVS: Regular rate and rhythm  Abd: soft, non-tender, non-distended, bowel sounds present. Ext: No clubbing, cyanosis, edema, peripheral pulses 2+ Rad/DP/PT. LUE proximal graft with palpable thrill.  Pressure dressing in place without strike through   CNS: Moves all extremities, no gross focal motor deficits  Skin: No erythema or ulcerations     Labs:   Lab Results   Component Value Date    WBC 10.7 06/02/2022    HGB 9.4 06/02/2022    HCT 29.4 06/02/2022    MCV 93.6 06/02/2022     06/02/2022     Lab Results   Component Value Date     06/02/2022    K 4.1 06/02/2022    CL 94 06/02/2022    CO2 25 06/02/2022    BUN 40 06/02/2022    CREATININE 11.88 06/02/2022    GLUCOSE 92 06/02/2022    CALCIUM 9.4 06/02/2022     Lab Results   Component Value Date    INR 1.1 05/31/2022       Imaging:        Impression:  Patient Active Problem List   Diagnosis    Biventricular CHF (congestive heart failure) (HCC) with icd chronic     Paroxysmal atrial fibrillation (HCC)    Controlled type 2 diabetes mellitus with chronic kidney disease on chronic dialysis, without long-term current use of insulin ([address]    Pulmonary emboli (HCC) rt lower lobe in 8/15 with negative venous doppler and tt since august 14 - with therapeutic inr     Lactic acidosis    ESRD on dialysis ([address]    Leukocytosis    Tachycardia    S/P cholecystectomy    Cardiomyopathy ([address]    Anemia of chronic disease    Fever and chills    Fever of unknown origin    Streptococcal septicemia ([address]    CAD (coronary artery disease)    Sepsis due to methicillin susceptible Staphylococcus aureus (MSSA) without acute organ dysfunction (HCC) related to dialysis catheter    Perianal abscess    Proctitis    Necrotizing fasciitis (HCC)    Hypoglycemia    Pulmonary hyperinflation    Maricarmen-rectal abscess    Complications, dialysis, catheter, mechanical ([address]       1. Bleeding from herograft    Recommendation:    1. Patient with bleeding from [address] after first use at dialysis today. Herograft placed by Dr. Mike Fernandez at [facility] on 5/10/22. 2. Recommend admit to observation and continue pressure dressing. Will reevaluate in AM  3. Follow up with Dr. Mike Fernandez as outpatient   4.  Recommend using R femoral port for dialysis until able to be seen by Dr. Kiera Wolf DO  7:07 PM  6/28/2022

## 2022-06-29 VITALS
RESPIRATION RATE: 18 BRPM | HEART RATE: 84 BPM | TEMPERATURE: 97.2 F | DIASTOLIC BLOOD PRESSURE: 75 MMHG | OXYGEN SATURATION: 98 % | SYSTOLIC BLOOD PRESSURE: 133 MMHG

## 2022-06-29 PROCEDURE — G0378 HOSPITAL OBSERVATION PER HR: HCPCS

## 2022-06-29 RX ORDER — SODIUM CHLORIDE 9 MG/ML
INJECTION, SOLUTION INTRAVENOUS PRN
Status: DISCONTINUED | OUTPATIENT
Start: 2022-06-29 | End: 2022-06-29 | Stop reason: HOSPADM

## 2022-06-29 RX ORDER — GABAPENTIN 300 MG/1
300 CAPSULE ORAL DAILY
Status: DISCONTINUED | OUTPATIENT
Start: 2022-06-29 | End: 2022-06-29 | Stop reason: HOSPADM

## 2022-06-29 RX ORDER — LANTHANUM CARBONATE 500 MG/1
1000 TABLET, CHEWABLE ORAL
Status: DISCONTINUED | OUTPATIENT
Start: 2022-06-29 | End: 2022-06-29 | Stop reason: HOSPADM

## 2022-06-29 RX ORDER — SODIUM CHLORIDE 0.9 % (FLUSH) 0.9 %
5-40 SYRINGE (ML) INJECTION PRN
Status: DISCONTINUED | OUTPATIENT
Start: 2022-06-29 | End: 2022-06-29 | Stop reason: HOSPADM

## 2022-06-29 RX ORDER — SODIUM CHLORIDE 0.9 % (FLUSH) 0.9 %
5-40 SYRINGE (ML) INJECTION EVERY 12 HOURS SCHEDULED
Status: DISCONTINUED | OUTPATIENT
Start: 2022-06-29 | End: 2022-06-29 | Stop reason: HOSPADM

## 2022-06-29 RX ORDER — ONDANSETRON 2 MG/ML
4 INJECTION INTRAMUSCULAR; INTRAVENOUS EVERY 6 HOURS PRN
Status: DISCONTINUED | OUTPATIENT
Start: 2022-06-29 | End: 2022-06-29 | Stop reason: HOSPADM

## 2022-06-29 RX ORDER — ONDANSETRON 4 MG/1
4 TABLET, ORALLY DISINTEGRATING ORAL EVERY 8 HOURS PRN
Status: DISCONTINUED | OUTPATIENT
Start: 2022-06-29 | End: 2022-06-29 | Stop reason: HOSPADM

## 2022-06-29 RX ORDER — METOPROLOL SUCCINATE 50 MG/1
50 TABLET, EXTENDED RELEASE ORAL DAILY
Status: DISCONTINUED | OUTPATIENT
Start: 2022-06-29 | End: 2022-06-29 | Stop reason: HOSPADM

## 2022-06-29 RX ORDER — METHOCARBAMOL 500 MG/1
750 TABLET, FILM COATED ORAL 3 TIMES DAILY PRN
Status: DISCONTINUED | OUTPATIENT
Start: 2022-06-29 | End: 2022-06-29 | Stop reason: HOSPADM

## 2022-06-29 RX ORDER — ACETAMINOPHEN 325 MG/1
650 TABLET ORAL EVERY 4 HOURS PRN
Status: DISCONTINUED | OUTPATIENT
Start: 2022-06-29 | End: 2022-06-29 | Stop reason: HOSPADM

## 2022-06-29 ASSESSMENT — PAIN - FUNCTIONAL ASSESSMENT: PAIN_FUNCTIONAL_ASSESSMENT: NONE - DENIES PAIN

## 2022-06-29 NOTE — H&P
[address] [facility]  CDU / OBSERVATION eNCOUnter  Resident Note     Pt Name: Elza Griffith  MRN: [id number]  K[place]urt 1960  Date of evaluation: 6/29/22  Patient's PCP is : No primary care provider on file. CHIEF COMPLAINT     No chief complaint on file. Bleeding dialysis hero graft      HISTORY OF PRESENT ILLNESS    Elza Griffith is a 62 y.o. male who presents with complaints of bleeding hero graft that was just used at dialysis for first time yesterday. Bleeding started after dialysis completed around 1300. Seen by vascular and bleeding stopped in ED s/p pressure dressing, but recommended placement in observation unit overnight to ensure bleeding cessation. Hx afib, CHF, COPD, ESRD and gets dialysis Tues, Thurs, Sat. Pt evaluated this AM and denied any bleeding. No numbness, weakness, tingling. No other complaints. Location/Symptom: Bleeding  Timing/Onset: Yesterday  Provocation: None  Quality: N/A  Radiation: N/A  Severity: Mild  Timing/Duration: Improved  Modifying Factors: N/A    REVIEW OF SYSTEMS       General ROS - No fevers, No malaise   Ophthalmic ROS - No discharge, No changes in vision  ENT ROS -  No sore throat, No rhinorrhea,   Respiratory ROS - no shortness of breath, no cough, no  wheezing  Cardiovascular ROS - No chest pain, no dyspnea on exertion  Gastrointestinal ROS - No abdominal pain, no nausea or vomiting, no change in bowel habits, no black or bloody stools  Genito-Urinary ROS - No dysuria, trouble voiding, or hematuria  Musculoskeletal ROS - No myalgias, No arthalgias  Neurological ROS - No headache, no dizziness/lightheadedness, No focal weakness, no loss of sensation  Dermatological ROS - No lesions, No rash, +bleeding dialysis site     (PQRS) Advance directives on face sheet per hospital policy.  No change unless specifically mentioned in chart    PAST MEDICAL HISTORY    has a past medical history of A-V fistula ([address] AICD (automatic cardioverter/defibrillator) present, Asthma, Atrial fibrillation ([facility] Blood transfusion reaction, CAD (coronary artery disease), CHF (congestive heart failure) ([facility] CHF (congestive heart failure) ([facility] COPD (chronic obstructive pulmonary disease) ([facility] CRF (chronic renal failure), Diabetes mellitus ([facility] Dialysis patient ([facility] GERD (gastroesophageal reflux disease), Hemodialysis patient ([facility] Hypertension, and Obesity. I have reviewed the past medical history with the patient and it is pertinent to this complaint. SURGICAL HISTORY      has a past surgical history that includes vascular surgery; Cardiac defibrillator placement (2006); pacemaker placement; and IR TUNNELED CVC PLACE WO SQ PORT/PUMP > 5 YEARS (2/28/2022). I have reviewed and agree with Surgical History entered and it is pertinent to this complaint. CURRENT MEDICATIONS     gabapentin (NEURONTIN) capsule 300 mg, Daily  lanthanum (FOSRENOL) chewable tablet 1,000 mg, TID WC  methocarbamol (ROBAXIN) tablet 750 mg, TID PRN  metoprolol succinate (TOPROL XL) extended release tablet 50 mg, Daily  sacubitril-valsartan (ENTRESTO) 24-26 MG per tablet 0.5 tablet, BID  sodium chloride flush 0.9 % injection 5-40 mL, 2 times per day  sodium chloride flush 0.9 % injection 5-40 mL, PRN  0.9 % sodium chloride infusion, PRN  acetaminophen (TYLENOL) tablet 650 mg, Q4H PRN  ondansetron (ZOFRAN-ODT) disintegrating tablet 4 mg, Q8H PRN   Or  ondansetron (ZOFRAN) injection 4 mg, Q6H PRN        All medication charted and reviewed. ALLERGIES     is allergic to spironolactone. FAMILY HISTORY     is adopted. family history is not on file. He was adopted. The patient denies any pertinent family history. I have reviewed and agree with the family history entered. I have reviewed the Family History and it is not significant to the case    SOCIAL HISTORY      reports that he has never smoked.  He has never used smokeless tobacco. He reports that he does not drink alcohol and does not use drugs. I have reviewed and agree with all Social.  There are no concerns for substance abuse/use. PHYSICAL EXAM     INITIAL VITALS:  oral temperature is 97.5 °F (36.4 °C). His blood pressure is 104/63 and his pulse is 88. His respiration is 16 and oxygen saturation is 98%. CONSTITUTIONAL: AOx4, no apparent distress, appears stated age   HEAD: normocephalic, atraumatic   EYES: PERRLA, EOMI    ENT: moist mucous membranes, uvula midline   NECK: supple, symmetric   BACK: symmetric   LUNGS: clear to auscultation bilaterally   CARDIOVASCULAR: regular rate and rhythm, no murmurs, rubs or gallops   ABDOMEN: soft, non-tender, non-distended with normal active bowel sounds   NEUROLOGIC:  MAEx4, no focal sensory or motor deficits   MUSCULOSKELETAL: no clubbing, cyanosis or edema, + thrill LUE   SKIN: No evidence of bleeding       DIFFERENTIAL DIAGNOSIS/MDM:   Rash:   DDX: Brigido Rue syndrome/ TEN/ drug related, urticaria, vesicular v maculopapular, scabies, bed bugs, viral exanthem, cellulitis, abscess/ MRSA, eczema/ atopic dermatitis      DIAGNOSTIC RESULTS     RADIOLOGY:   I directly visualized the following  images and reviewed the radiologist interpretations:    No results found. LABS:  I have reviewed and interpreted all available lab results.   Labs Reviewed   CBC WITH AUTO DIFFERENTIAL - Abnormal; Notable for the following components:       Result Value    RBC 3.67 (*)     Hemoglobin 11.4 (*)     Hematocrit 35.7 (*)     RDW 17.9 (*)     Monocytes 16 (*)     Eosinophils % 5 (*)     All other components within normal limits   BASIC METABOLIC PANEL W/ REFLEX TO MG FOR LOW K - Abnormal; Notable for the following components:    CREATININE 6.74 (*)     Potassium 3.5 (*)     Chloride 93 (*)     CO2 33 (*)     GFR Non- 8 (*)     GFR  10 (*)     All other components within normal limits   APTT - Abnormal; Notable for the following components:    .0 (*)     All other components within normal limits   IMMATURE PLATELET FRACTION - Abnormal; Notable for the following components:    Platelet, Fluorescence 122 (*)     All other components within normal limits   APTT - Abnormal; Notable for the following components:    PTT 33.1 (*)     All other components within normal limits   COVID-19, RAPID   PROTIME-INR   MAGNESIUM         CDU PAGE / Ed Kim is a 62 y.o. male who presents with c/o bleeding dialysis site, hero graft after being used first time at dialysis yesterday that started at 1300 and improved s/p pressure dressing in ED. Herograft was placed by Alexandra Coley 5/10/22 at [facility]. Evaluated by vascular in ED and recommended placement in observation overnight. No evidence of active bleeding this AM and vascular recommended outpatient follow up for likely fistulogram with [facility]. · Follow up outpatient at [facility] for fisulography- Use R femoral port for dialysis until seen by  outpatient  · Continue home medications and pain control  · Monitor vitals, labs, and imaging  · DISPO: pending consults and clinical improvement    CONSULTS:    IP CONSULT TO VASCULAR SURGERY    PROCEDURES:  Not indicated       PATIENT REFERRED TO:    No follow-up provider specified. --  Celeste Hanks MD   Emergency Medicine Resident     This dictation was generated by voice recognition computer software. Although all attempts are made to edit the dictation for accuracy, there may be errors in the transcription that are not intended.

## 2022-06-29 NOTE — ED NOTES
Report received from [facility], [address] X Fabio Orozco, LPN  58/93/99 1576

## 2022-06-29 NOTE — ED NOTES
Pt remains alert and oriented times 4. Pt denies any needs at this time.       Da Hall, KENDRA  49/78/63 1583

## 2022-06-29 NOTE — ED NOTES
Pt resting on cot with eyes closed showing no s/s of distress. Pt denies any needs at this time. Respirations even and unlabored.                Juan Antonio Townsend LPN  23/89/75 [id number]

## 2022-06-29 NOTE — CARE COORDINATION
06/29/22 0854   Readmission Assessment   Number of Days since last admission? 8-30 days   Previous Disposition Home with Family   Who is being Interviewed Patient   What was the patient's/caregiver's perception as to why they think they needed to return back to the hospital? Other (Comment)  (fistula was bleeding)   Did you visit your Primary Care Physician after you left the hospital, before you returned this time? No   Why weren't you able to visit your PCP? Did not have an appointment   Did you see a specialist, such as Cardiac, Pulmonary, Orthopedic Physician, etc. after you left the hospital? No   Who advised the patient to return to the hospital? Self-referral   Does the patient report anything that got in the way of taking their medications? No   In our efforts to provide the best possible care to you and others like you, can you think of anything that we could have done to help you after you left the hospital the first time, so that you might not have needed to return so soon?  Other (Comment)  (unrelated)

## 2022-06-29 NOTE — PROGRESS NOTES
[address] [employer]  CDU / OBSERVATION ENCOUNTER  ATTENDING NOTE       I performed a history and physical examination of the patient and discussed management with the resident or midlevel provider. I reviewed the resident or midlevel provider's note and agree with the documented findings and plan of care. Any areas of disagreement are noted on the chart. I was personally present for the key portions of any procedures. I have documented in the chart those procedures where I was not present during the key portions. I have reviewed the nurses notes. I agree with the chief complaint, past medical history, past surgical history, allergies, medications, social and family history as documented unless otherwise noted below. The Family history, social history, and ROS are effectively unchanged since admission unless noted elsewhere in the chart. Patient very anxious to leave. Discussed with patient's primary vascular surgeon regarding close follow-up and fistulogram.  Patient understands and is discharged in good condition.     Herlinda Mars MD  Attending Emergency  Physician

## 2022-06-29 NOTE — CARE COORDINATION
06/29/22 0835   Service Assessment   Patient Orientation Alert and Oriented   Cognition Alert   History Provided By Patient   Primary Caregiver Self   Support Systems Family Members; Children   PCP Verified by CM Yes  ([facility])   Last Visit to PCP Within last 3 months   Prior Functional Level Independent in ADLs/IADLs   Current Functional Level Independent in ADLs/IADLs   Can patient return to prior living arrangement Yes   Ability to make needs known: Good   Family able to assist with home care needs: Yes   Social/Functional History   Lives With Daughter   Type of [address] One level; Work area in [facility] 46 to enter without rails   Entrance Stairs - Number of Steps 9   Bathroom Shower/Tub Tub/Shower unit   [address] bars in [employer] Responsibilities Yes   Ambulation Assistance Independent   Transfer Assistance Independent   Active  No   Patient's  Info medical transport   Mode of Transportation Cab   Occupation On disability   Discharge Planning   [facility] [address] [facility]   Current Services Prior To Admission [address] Needed N/A   DME Ordered? No   Potential Assistance Purchasing Medications No   Meds-to-Beds: Does the patient want to have any new prescriptions delivered to bedside prior to discharge? Yes   Type of Home Care Services None   One/Two Story Residence One story   History of falls? 0   Services At/After Discharge   [facility] Information Provided?  No   Mode of Transport at Discharge Self   Condition of Participation: Discharge Planning   The Plan for Transition of Care is related to the following treatment goals: wants to return home [facility] independently - Dialysis at [facility] [address] at 5 am

## 2022-06-29 NOTE — DISCHARGE SUMMARY
CDU Discharge Summary        Patient:  Stephane Engle  YOB: 1960    MRN: [id number]   Acct: [de-identified]    Primary Care Physician: No primary care provider on file. Admit date:  6/28/2022  5:53 PM  Discharge date: 6/29/2022  9:50 AM      Discharge Diagnoses:     1.)  Bleeding from graft site. Patient with resolution after treatment overnight. Patient admitted for reassessment. Patient for follow-up with vascular surgery. Follow-up:  Call today/tomorrow for a follow up appointment with No primary care provider on file. , or return to the Emergency Room with worsening symptoms    Stressed to patient the importance of following up with primary care doctor for further workup/management of symptoms. Pt verbalizes understanding and agrees with plan.     Discharge Medication Changes:       Medication List      CHANGE how you take these medications    cinacalcet 60 MG tablet  Commonly known as: SENSIPAR  60 mg every Tuesday Thursday and Saturday with dialysis  What changed:   · how much to take  · how to take this  · when to take this        CONTINUE taking these medications    apixaban 5 MG Tabs tablet  Commonly known as: Eliquis  Take 1 tablet by mouth 2 times daily     aspirin 81 MG chewable tablet     gabapentin 300 MG capsule  Commonly known as: NEURONTIN     lanthanum 1000 MG chewable tablet  Commonly known as: FOSRENOL     methocarbamol 750 MG tablet  Commonly known as: ROBAXIN     metoprolol succinate 50 MG extended release tablet  Commonly known as: TOPROL XL     midodrine 10 MG tablet  Commonly known as: PROAMATINE     Mircera 30 MCG/0.3ML Sosy  Generic drug: Methoxy PEG-Epoetin Beta     chrissie-pito Tabs  TAKE 1 TABLET BY MOUTH EVERY EVENING     sacubitril-valsartan 24-26 MG per tablet  Commonly known as: ENTRESTO  Take 0.5 tablets by mouth 2 times daily     Vitamin D3 1.25 MG (74782 UT) Caps            Diet:  No diet orders on file, advance as tolerated     Activity:  As tolerated    Consultants: IP CONSULT TO VASCULAR SURGERY    Procedures:  Not indicated      Diagnostic Test:   Results for orders placed or performed during the hospital encounter of 06/28/22   COVID-19, Rapid    Specimen: Nasopharyngeal Swab   Result Value Ref Range    Specimen Description . NASOPHARYNGEAL SWAB     SARS-CoV-2, Rapid Not Detected Not Detected   CBC with Auto Differential   Result Value Ref Range    WBC 7.7 3.5 - 11.3 k/uL    RBC 3.67 (L) 4.21 - 5.77 m/uL    Hemoglobin 11.4 (L) 13.0 - 17.0 g/dL    Hematocrit 35.7 (L) 40.7 - 50.3 %    MCV 97.3 82.6 - 102.9 fL    MCH 31.1 25.2 - 33.5 pg    MCHC 31.9 28.4 - 34.8 g/dL    RDW 17.9 (H) 11.8 - 14.4 %    Platelets See Reflexed IPF Result 138 - 453 k/uL    NRBC Automated 0.0 0.0 per 100 WBC    RBC Morphology ANISOCYTOSIS PRESENT     Seg Neutrophils 55 36 - 65 %    Lymphocytes 24 24 - 43 %    Monocytes 16 (H) 3 - 12 %    Eosinophils % 5 (H) 1 - 4 %    Basophils 0 0 - 2 %    Immature Granulocytes 0 0 %    Segs Absolute 4.24 1.50 - 8.10 k/uL    Absolute Lymph # 1.85 1.10 - 3.70 k/uL    Absolute Mono # 1.20 0.10 - 1.20 k/uL    Absolute Eos # 0.35 0.00 - 0.44 k/uL    Basophils Absolute <0.03 0.00 - 0.20 k/uL    Absolute Immature Granulocyte <0.03 0.00 - 0.30 k/uL   Basic Metabolic Panel w/ Reflex to MG   Result Value Ref Range    Glucose 94 70 - 99 mg/dL    BUN 14 8 - 23 mg/dL    CREATININE 6.74 (HH) 0.70 - 1.20 mg/dL    Calcium 8.9 8.6 - 10.4 mg/dL    Sodium 139 135 - 144 mmol/L    Potassium 3.5 (L) 3.7 - 5.3 mmol/L    Chloride 93 (L) 98 - 107 mmol/L    CO2 33 (H) 20 - 31 mmol/L    Anion Gap 13 9 - 17 mmol/L    GFR Non-African American 8 (L) >60 mL/min    GFR  10 (L) >60 mL/min    GFR Comment         Protime-INR   Result Value Ref Range    Protime 12.0 9.1 - 12.3 sec    INR 1.1    APTT   Result Value Ref Range    .0 (HH) 20.5 - 30.5 sec   Immature Platelet Fraction   Result Value Ref Range    Platelet, Immature Fraction 8.1 1.1 - 10.3 % Platelet, Fluorescence 122 (L) 138 - 453 k/uL   Magnesium   Result Value Ref Range    Magnesium 1.8 1.6 - 2.6 mg/dL   APTT   Result Value Ref Range    PTT 33.1 (H) 20.5 - 30.5 sec     No results found. Physical Exam:    General appearance - NAD, AOx 3    Lungs -CTAB, no R/R/R  Heart - RRR, no M/R/G  Abdomen - Soft, NT/ND  Neurological:  MAEx4, No focal motor deficit, sensory loss  Extremities - Cap refil <2 sec in all ext., no edema  Skin -warm, dry no further bleeding from graft site      Hospital Course:  Clinical course has improved, labs and imaging reviewed. Wandy Eldridge originally presented to the hospital on 6/28/2022  5:53 PM with bleeding from graft site after dialysis. At that time it was determined that He required further observation and reassessment in hemostasis. Patient was seen by vascular surgery. Patient very anxious to be discharged after initial assessment. I discussed the case with his primary vascular surgeon and arrangements are made for follow-up. Patient was discharged with bruit and thrill present. Patient was in good condition with plans for close follow-up. Labs and imaging were followed daily. Imaging results as above. He is medically stable to be discharged. Disposition: Home    Patient stated that they will not drive themselves home from the hospital if they have gotten pain killers/ narcotics earlier that day and that they will arrange for transportation on their own or work with the  for a ride. Patient counseled NOT to drive while under the influence of narcotics/ pain killers. Condition: Good    Patient stable and ready for discharge home. I have discussed plan of care with patient and they are in understanding. They were instructed to read discharge paperwork. All of their questions and concerns were addressed.      Time Spent: 0 day      --  Hitesh Rios MD  Emergency Medicine Attending Physician    This dictation was generated by voice recognition computer software. Although all attempts are made to edit the dictation for accuracy, there may be errors in the transcription that are not intended.

## 2022-06-29 NOTE — PROGRESS NOTES
Vascular Surgery Progress Note         PATIENT NAME: Lori Angulo     TODAY'S DATE: 6/29/2022, 6:24 AM    CC: Arm sore from this bandage    SUBJECTIVE:    Pt seen and examined. VSS, ORIANA, pressure dressing LUE removed this morning, no hematoma or bleeding noted. Discussed importance of patient follow up with his vascular surgeon prior to his next HD appointment on Thursday. Patient expressed understanding. OBJECTIVE:   Vitals:  /63   Pulse 88   Temp 97.5 °F (36.4 °C) (Oral)   Resp 16   SpO2 98%      INTAKE/OUTPUT:    No intake or output data in the 24 hours ending 06/29/22 0624              GENERAL: AOx3, NAD  HEENT: EOMI bilaterally  CARDIAC: Regular rate and rhythm. ABDOMEN: Soft, NT, ND  EXTREMITY: moves all extremities, mild edema LUE 2/2 to wrap, graft pulsatile but expect to improve after wrap takedown  NEURO: Gross motor intact.     Data:  CBC with Differential:    Lab Results   Component Value Date    WBC 7.7 06/28/2022    RBC 3.67 06/28/2022    HGB 11.4 06/28/2022    HCT 35.7 06/28/2022    PLT See Reflexed IPF Result 06/28/2022    MCV 97.3 06/28/2022    MCH 31.1 06/28/2022    MCHC 31.9 06/28/2022    RDW 17.9 06/28/2022    METASPCT 1 04/14/2015    LYMPHOPCT 24 06/28/2022    MONOPCT 16 06/28/2022    BASOPCT 0 06/28/2022    MONOSABS 1.20 06/28/2022    LYMPHSABS 1.85 06/28/2022    EOSABS 0.35 06/28/2022    BASOSABS <0.03 06/28/2022    DIFFTYPE NOT REPORTED 10/20/2020     BMP:    Lab Results   Component Value Date     06/28/2022    K 3.5 06/28/2022    CL 93 06/28/2022    CO2 33 06/28/2022    BUN 14 06/28/2022    LABALBU 3.7 05/31/2022    CREATININE 6.74 06/28/2022    CALCIUM 8.9 06/28/2022    GFRAA 10 06/28/2022    LABGLOM 8 06/28/2022    GLUCOSE 94 06/28/2022         ASSESSMENT   Bleeding from LUE dialysis access graft requiring prolonged pressure dressing for management    PLAN  Patient needs follow up with his vascular surgeon at [facility] to discuss fistulography and possible intervention, patient has atrial fibrillation and likely cannot come off his eliquis so removal of anticoagulation not a reasonable option at this time. Currently patient bleeding stopped and no hematoma, ok for d/c from vascular standpoint.  Plan relayed to obs team this AM.        Electronically signed by Cesar Díaz DO  on 6/29/2022 at 6:24 AM

## 2023-10-19 ENCOUNTER — HOSPITAL ENCOUNTER (EMERGENCY)
Age: 63
Discharge: HOME | End: 2023-10-19
Attending: EMERGENCY MEDICINE
Payer: COMMERCIAL

## 2023-10-19 VITALS
HEART RATE: 83 BPM | HEIGHT: 73 IN | WEIGHT: 264.99 LBS | SYSTOLIC BLOOD PRESSURE: 136 MMHG | OXYGEN SATURATION: 97 % | BODY MASS INDEX: 35.12 KG/M2 | DIASTOLIC BLOOD PRESSURE: 85 MMHG | TEMPERATURE: 98.1 F | RESPIRATION RATE: 18 BRPM

## 2023-10-19 DIAGNOSIS — S99.922A INJURY OF TOE ON LEFT FOOT, INITIAL ENCOUNTER: Primary | ICD-10-CM

## 2023-10-19 PROCEDURE — 99282 EMERGENCY DEPT VISIT SF MDM: CPT

## 2023-10-19 PROCEDURE — 6370000000 HC RX 637 (ALT 250 FOR IP): Performed by: EMERGENCY MEDICINE

## 2023-10-19 RX ADMIN — Medication 1 EACH: at 04:36

## 2023-10-19 ASSESSMENT — ENCOUNTER SYMPTOMS
ABDOMINAL DISTENTION: 0
CHEST TIGHTNESS: 0
VOMITING: 0
COLOR CHANGE: 0
APNEA: 0
EYES NEGATIVE: 1
DIARRHEA: 0
SINUS PAIN: 0
SHORTNESS OF BREATH: 0
CONSTIPATION: 0

## 2023-10-19 ASSESSMENT — PAIN SCALES - GENERAL: PAINLEVEL_OUTOF10: 0

## 2023-10-19 ASSESSMENT — PAIN - FUNCTIONAL ASSESSMENT: PAIN_FUNCTIONAL_ASSESSMENT: NONE - DENIES PAIN

## 2023-10-19 NOTE — ED NOTES
Px toe rewrapped. Education on toe wrapping given. Px expresses no questions or concerns.       Marko Galarza RN  10/19/23 [id number]

## 2023-10-19 NOTE — DISCHARGE INSTRUCTIONS
Keep toe wrapped today. If you do soak through the bandage I recommend replacing the gauze and Tegaderm tightly around the toe. If bleeding continues to the afternoon I recommend reevaluation.

## 2024-09-03 ENCOUNTER — HOSPITAL ENCOUNTER (EMERGENCY)
Age: 64
Discharge: HOME | End: 2024-09-03
Attending: EMERGENCY MEDICINE
Payer: COMMERCIAL

## 2024-09-03 ENCOUNTER — APPOINTMENT (OUTPATIENT)
Dept: GENERAL RADIOLOGY | Age: 64
End: 2024-09-03
Payer: COMMERCIAL

## 2024-09-03 VITALS
OXYGEN SATURATION: 99 % | DIASTOLIC BLOOD PRESSURE: 80 MMHG | BODY MASS INDEX: 35.98 KG/M2 | TEMPERATURE: 97.9 F | RESPIRATION RATE: 13 BRPM | HEART RATE: 60 BPM | WEIGHT: 272.71 LBS | SYSTOLIC BLOOD PRESSURE: 140 MMHG

## 2024-09-03 DIAGNOSIS — R42 DIZZINESS: ICD-10-CM

## 2024-09-03 DIAGNOSIS — R07.89 ATYPICAL CHEST PAIN: Primary | ICD-10-CM

## 2024-09-03 LAB
ANION GAP SERPL CALCULATED.3IONS-SCNC: 14 MMOL/L (ref 9–17)
BASOPHILS # BLD: 0.03 K/UL (ref 0–0.2)
BASOPHILS NFR BLD: 0 % (ref 0–2)
BUN SERPL-MCNC: 20 MG/DL (ref 8–23)
BUN/CREAT SERPL: 3 (ref 9–20)
CALCIUM SERPL-MCNC: 8.9 MG/DL (ref 8.6–10.4)
CHLORIDE SERPL-SCNC: 93 MMOL/L (ref 98–107)
CO2 SERPL-SCNC: 31 MMOL/L (ref 20–31)
CREAT SERPL-MCNC: 6.8 MG/DL (ref 0.7–1.2)
EOSINOPHIL # BLD: 0.21 K/UL (ref 0–0.44)
EOSINOPHILS RELATIVE PERCENT: 3 % (ref 1–4)
ERYTHROCYTE [DISTWIDTH] IN BLOOD BY AUTOMATED COUNT: 13.9 % (ref 11.8–14.4)
GFR, ESTIMATED: 9 ML/MIN/1.73M2
GLUCOSE SERPL-MCNC: 74 MG/DL (ref 70–99)
HCT VFR BLD AUTO: 31.4 % (ref 40.7–50.3)
HGB BLD-MCNC: 10.5 G/DL (ref 13–17)
IMM GRANULOCYTES # BLD AUTO: 0.03 K/UL (ref 0–0.3)
IMM GRANULOCYTES NFR BLD: 0 %
LYMPHOCYTES NFR BLD: 1.27 K/UL (ref 1.1–3.7)
LYMPHOCYTES RELATIVE PERCENT: 17 % (ref 24–43)
MAGNESIUM SERPL-MCNC: 1.7 MG/DL (ref 1.6–2.6)
MCH RBC QN AUTO: 30.3 PG (ref 25.2–33.5)
MCHC RBC AUTO-ENTMCNC: 33.4 G/DL (ref 28.4–34.8)
MCV RBC AUTO: 90.8 FL (ref 82.6–102.9)
MONOCYTES NFR BLD: 0.92 K/UL (ref 0.1–1.2)
MONOCYTES NFR BLD: 12 % (ref 3–12)
NEUTROPHILS NFR BLD: 68 % (ref 36–65)
NEUTS SEG NFR BLD: 4.93 K/UL (ref 1.5–8.1)
NRBC BLD-RTO: 0 PER 100 WBC
PLATELET # BLD AUTO: 161 K/UL (ref 138–453)
PMV BLD AUTO: 11.2 FL (ref 8.1–13.5)
POTASSIUM SERPL-SCNC: 3.9 MMOL/L (ref 3.7–5.3)
RBC # BLD AUTO: 3.46 M/UL (ref 4.21–5.77)
SODIUM SERPL-SCNC: 138 MMOL/L (ref 135–144)
TROPONIN I SERPL HS-MCNC: 122 NG/L (ref 0–22)
TROPONIN I SERPL HS-MCNC: 124 NG/L (ref 0–22)
WBC OTHER # BLD: 7.4 K/UL (ref 3.5–11.3)

## 2024-09-03 PROCEDURE — 84484 ASSAY OF TROPONIN QUANT: CPT

## 2024-09-03 PROCEDURE — 85025 COMPLETE CBC W/AUTO DIFF WBC: CPT

## 2024-09-03 PROCEDURE — 36415 COLL VENOUS BLD VENIPUNCTURE: CPT

## 2024-09-03 PROCEDURE — 83735 ASSAY OF MAGNESIUM: CPT

## 2024-09-03 PROCEDURE — 71045 X-RAY EXAM CHEST 1 VIEW: CPT

## 2024-09-03 PROCEDURE — 80048 BASIC METABOLIC PNL TOTAL CA: CPT

## 2024-09-03 PROCEDURE — 99285 EMERGENCY DEPT VISIT HI MDM: CPT

## 2024-09-03 PROCEDURE — 93005 ELECTROCARDIOGRAM TRACING: CPT | Performed by: EMERGENCY MEDICINE

## 2024-09-03 ASSESSMENT — PAIN SCALES - GENERAL: PAINLEVEL_OUTOF10: 0

## 2024-09-03 ASSESSMENT — PAIN - FUNCTIONAL ASSESSMENT: PAIN_FUNCTIONAL_ASSESSMENT: 0-10

## 2024-09-03 ASSESSMENT — HEART SCORE: ECG: NON-SPECIFC REPOLARIZATION DISTURBANCE/LBTB/PM

## 2024-09-03 NOTE — ED PROVIDER NOTES
EMERGENCY DEPARTMENT ENCOUNTER    Pt Name: Jeremy Giraldo  MRN: [id number]  Birthdate 1960  Date of evaluation: 9/3/24  CHIEF COMPLAINT       Chief Complaint   Patient presents with    Chest Pain     324 Aspirin PTA      HISTORY OF PRESENT ILLNESS   This is a 63-year-old male that presents with complaints of chest pain.  Patient has a history of end-stage renal disease as well as a AICD placement, he also has a history of coronary disease.  The patient presented with complaints of chest pain.  Patient was at dialysis today, he began having some lower extremity cramps with some associated chest discomfort nausea and diaphoresis, at the time EMS got there the patient states he was feeling improved.  At this time he denies any chest pain shortness of breath or other complaints.           REVIEW OF SYSTEMS     Review of Systems  PASTMEDICAL HISTORY     Past Medical History:   Diagnosis Date    A-V fistula ([facility])     lt arm    AICD (automatic cardioverter/defibrillator) present 2006    Asthma     Atrial fibrillation ([facility])     Blood transfusion reaction     CAD (coronary artery disease)     dr mccarthy cardiologist recently aicd check /clearance 4/15    CHF (congestive heart failure) ([facility])     CHF (congestive heart failure) ([facility])     COPD (chronic obstructive pulmonary disease) ([facility])     CRF (chronic renal failure)     Diabetes mellitus ([facility])     IDDM    Dialysis patient ([facility])     GERD (gastroesophageal reflux disease)     Hemodialysis patient ([facility])     laskey tues-thurs-sat    Hypertension     treated w/ meds    Obesity      Past Problem List  Patient Active Problem List   Diagnosis Code    Biventricular CHF (congestive heart failure) ([facility]) with icd chronic  I50.82    Paroxysmal atrial fibrillation ([facility]) I48.0    Controlled type 2 diabetes mellitus with chronic kidney disease on chronic dialysis, without long-term current use of insulin ([facility]) E11.22, N18.6, Z99.2    Pulmonary emboli ([facility]) rt lower lobe in 8/15 with

## 2024-09-04 LAB
EKG ATRIAL RATE: 63 BPM
EKG P AXIS: 60 DEGREES
EKG P-R INTERVAL: 174 MS
EKG Q-T INTERVAL: 454 MS
EKG QRS DURATION: 100 MS
EKG QTC CALCULATION (BAZETT): 464 MS
EKG R AXIS: 31 DEGREES
EKG T AXIS: 62 DEGREES
EKG VENTRICULAR RATE: 63 BPM

## 2025-08-26 ENCOUNTER — HOSPITAL ENCOUNTER (EMERGENCY)
Age: 65
Discharge: HOME | End: 2025-08-26
Attending: EMERGENCY MEDICINE
Payer: COMMERCIAL

## 2025-08-26 ENCOUNTER — APPOINTMENT (OUTPATIENT)
Dept: VASCULAR LAB | Age: 65
End: 2025-08-26
Attending: EMERGENCY MEDICINE
Payer: COMMERCIAL

## 2025-08-26 VITALS
WEIGHT: 315 LBS | SYSTOLIC BLOOD PRESSURE: 121 MMHG | RESPIRATION RATE: 16 BRPM | OXYGEN SATURATION: 98 % | BODY MASS INDEX: 41.75 KG/M2 | DIASTOLIC BLOOD PRESSURE: 68 MMHG | HEIGHT: 73 IN | TEMPERATURE: 97.7 F | HEART RATE: 53 BPM

## 2025-08-26 DIAGNOSIS — T82.838A HEMORRHAGE OF ARTERIOVENOUS FISTULA, INITIAL ENCOUNTER: ICD-10-CM

## 2025-08-26 DIAGNOSIS — M79.602 PAIN IN LEFT ARM: Primary | ICD-10-CM

## 2025-08-26 LAB
ANION GAP SERPL CALCULATED.3IONS-SCNC: 11 MMOL/L (ref 9–16)
BUN SERPL-MCNC: 15 MG/DL (ref 8–23)
CALCIUM SERPL-MCNC: 8.7 MG/DL (ref 8.8–10.2)
CHLORIDE SERPL-SCNC: 94 MMOL/L (ref 98–107)
CO2 SERPL-SCNC: 31 MMOL/L (ref 20–31)
CREAT SERPL-MCNC: 5.4 MG/DL (ref 0.7–1.2)
ECHO BSA: 2.82 M2
GFR, ESTIMATED: 11 ML/MIN/1.73M2
GLUCOSE SERPL-MCNC: 83 MG/DL (ref 82–115)
INR PPP: 1.1
PARTIAL THROMBOPLASTIN TIME: 30.5 SEC (ref 23.9–33.8)
POTASSIUM SERPL-SCNC: 4.8 MMOL/L (ref 3.7–5.3)
PROTHROMBIN TIME: 14 SEC (ref 11.5–14.2)
SODIUM SERPL-SCNC: 136 MMOL/L (ref 136–145)
VAS LEFT DIST OUTFLOW EDV: 162.2 CM/S
VAS LEFT DIST OUTFLOW PSV: 354.2 CM/S
VAS LEFT MID OUTFLOW EDV: 104.1 CM/S
VAS LEFT MID OUTFLOW PSV: 325.7 CM/S
VAS LEFT PROX OUTFLOW EDV: 86.4 CM/S
VAS LEFT PROX OUTFLOW PSV: 218.3 CM/S

## 2025-08-26 PROCEDURE — 99284 EMERGENCY DEPT VISIT MOD MDM: CPT

## 2025-08-26 PROCEDURE — 6370000000 HC RX 637 (ALT 250 FOR IP): Performed by: EMERGENCY MEDICINE

## 2025-08-26 PROCEDURE — 93931 UPPER EXTREMITY STUDY: CPT

## 2025-08-26 PROCEDURE — 85610 PROTHROMBIN TIME: CPT

## 2025-08-26 PROCEDURE — 85730 THROMBOPLASTIN TIME PARTIAL: CPT

## 2025-08-26 PROCEDURE — 80048 BASIC METABOLIC PNL TOTAL CA: CPT

## 2025-08-26 RX ADMIN — GELATIN ABSORBABLE SPONGE 12-7 MM 1 EACH: 12-7 MISC at 15:25

## 2025-08-26 RX ADMIN — GELATIN ABSORBABLE SPONGE 12-7 MM 1 EACH: 12-7 MISC at 13:17

## 2025-08-26 ASSESSMENT — ENCOUNTER SYMPTOMS
EYE DISCHARGE: 0
SHORTNESS OF BREATH: 0
EYE PAIN: 0
FACIAL SWELLING: 0
ABDOMINAL PAIN: 0
BACK PAIN: 0
ABDOMINAL DISTENTION: 0
CHEST TIGHTNESS: 0

## 2025-08-26 ASSESSMENT — PAIN SCALES - GENERAL: PAINLEVEL_OUTOF10: 0

## 2025-08-26 ASSESSMENT — PAIN - FUNCTIONAL ASSESSMENT: PAIN_FUNCTIONAL_ASSESSMENT: 0-10
